# Patient Record
Sex: FEMALE | Race: WHITE | Employment: FULL TIME | URBAN - METROPOLITAN AREA
[De-identification: names, ages, dates, MRNs, and addresses within clinical notes are randomized per-mention and may not be internally consistent; named-entity substitution may affect disease eponyms.]

---

## 2017-05-12 PROCEDURE — 88175 CYTOPATH C/V AUTO FLUID REDO: CPT | Performed by: OBSTETRICS & GYNECOLOGY

## 2017-07-25 ENCOUNTER — OFFICE VISIT (OUTPATIENT)
Dept: INTERNAL MEDICINE CLINIC | Facility: CLINIC | Age: 57
End: 2017-07-25

## 2017-07-25 VITALS
BODY MASS INDEX: 21.07 KG/M2 | OXYGEN SATURATION: 97 % | SYSTOLIC BLOOD PRESSURE: 184 MMHG | TEMPERATURE: 98 F | DIASTOLIC BLOOD PRESSURE: 90 MMHG | HEART RATE: 97 BPM | WEIGHT: 128 LBS | HEIGHT: 65.5 IN

## 2017-07-25 DIAGNOSIS — Z00.00 ANNUAL PHYSICAL EXAM: ICD-10-CM

## 2017-07-25 DIAGNOSIS — E55.9 VITAMIN D DEFICIENCY: ICD-10-CM

## 2017-07-25 DIAGNOSIS — R94.31 ABNORMAL EKG: ICD-10-CM

## 2017-07-25 DIAGNOSIS — Z98.890 H/O RIGHT BREAST BIOPSY: ICD-10-CM

## 2017-07-25 DIAGNOSIS — Z76.89 ENCOUNTER TO ESTABLISH CARE: Primary | ICD-10-CM

## 2017-07-25 DIAGNOSIS — I10 HYPERTENSION, ESSENTIAL: ICD-10-CM

## 2017-07-25 DIAGNOSIS — I49.3 PVC'S (PREMATURE VENTRICULAR CONTRACTIONS): ICD-10-CM

## 2017-07-25 DIAGNOSIS — F17.200 SMOKER: ICD-10-CM

## 2017-07-25 PROCEDURE — 93000 ELECTROCARDIOGRAM COMPLETE: CPT | Performed by: INTERNAL MEDICINE

## 2017-07-25 PROCEDURE — 99386 PREV VISIT NEW AGE 40-64: CPT | Performed by: INTERNAL MEDICINE

## 2017-07-25 PROCEDURE — 90471 IMMUNIZATION ADMIN: CPT | Performed by: INTERNAL MEDICINE

## 2017-07-25 PROCEDURE — 93005 ELECTROCARDIOGRAM TRACING: CPT | Performed by: INTERNAL MEDICINE

## 2017-07-25 PROCEDURE — 90715 TDAP VACCINE 7 YRS/> IM: CPT | Performed by: INTERNAL MEDICINE

## 2017-07-25 RX ORDER — METOPROLOL SUCCINATE 50 MG/1
50 TABLET, EXTENDED RELEASE ORAL DAILY
Qty: 90 TABLET | Refills: 3 | Status: SHIPPED | OUTPATIENT
Start: 2017-07-25 | End: 2017-08-07

## 2017-07-25 RX ORDER — LORAZEPAM 0.5 MG/1
0.5 TABLET ORAL NIGHTLY PRN
Qty: 30 TABLET | Refills: 1 | Status: SHIPPED
Start: 2017-07-25 | End: 2018-04-13

## 2017-07-25 NOTE — PROGRESS NOTES
Angela Kothari is a 59year old female who presents for     Re-establish care. Last seen 12/18/12. Elevated blood pressure-  BP was elevated at Dr. Júnior Myers office last mo. BP was 160 to 170 range there. In past BP was on low side.  BP was 104/70 at l Packs/day: 0.75      Years: 0.00         Start date: 1/1/1978  Smokeless tobacco: Never Used                      Alcohol use:  Yes              Comment: occasional         Allergies:  No Known Allergies    Review of systems: her dtr's wedding in 5 wks. Discussed doing CT chest low dose screening. Order given. (30 pack years). Healthcare maintenance:  Vaccines- tdap today. Gyn Dr Gigi Ann. Pap normal 5/12/17.    Mammo-ordered by gyn, Last mammo at [facility] 10/12/16-

## 2017-08-04 ENCOUNTER — PRIOR ORIGINAL RECORDS (OUTPATIENT)
Dept: OTHER | Age: 57
End: 2017-08-04

## 2017-08-04 ENCOUNTER — HOSPITAL ENCOUNTER (OUTPATIENT)
Dept: CV DIAGNOSTICS | Facility: HOSPITAL | Age: 57
Discharge: HOME | End: 2017-08-04
Attending: INTERNAL MEDICINE
Payer: COMMERCIAL

## 2017-08-04 DIAGNOSIS — R94.31 ABNORMAL EKG: ICD-10-CM

## 2017-08-04 DIAGNOSIS — E55.9 VITAMIN D DEFICIENCY: ICD-10-CM

## 2017-08-04 DIAGNOSIS — Z76.89 ENCOUNTER TO ESTABLISH CARE: ICD-10-CM

## 2017-08-04 DIAGNOSIS — I10 HYPERTENSION, ESSENTIAL: ICD-10-CM

## 2017-08-04 DIAGNOSIS — I49.3 PVC'S (PREMATURE VENTRICULAR CONTRACTIONS): ICD-10-CM

## 2017-08-04 LAB
ALBUMIN SERPL BCP-MCNC: 3.8 G/DL (ref 3.5–4.8)
ALBUMIN/GLOB SERPL: 1.5 {RATIO} (ref 1–2)
ALP SERPL-CCNC: 37 U/L (ref 32–100)
ALT SERPL-CCNC: 16 U/L (ref 14–54)
ANION GAP SERPL CALC-SCNC: 6 MMOL/L (ref 0–18)
AST SERPL-CCNC: 16 U/L (ref 15–41)
BASOPHILS # BLD: 0.1 K/UL (ref 0–0.2)
BASOPHILS NFR BLD: 1 %
BILIRUB SERPL-MCNC: 0.7 MG/DL (ref 0.3–1.2)
BILIRUB UR QL: NEGATIVE
BUN SERPL-MCNC: 5 MG/DL (ref 8–20)
BUN/CREAT SERPL: 6.2 (ref 10–20)
CALCIUM SERPL-MCNC: 9.5 MG/DL (ref 8.5–10.5)
CHLORIDE SERPL-SCNC: 104 MMOL/L (ref 95–110)
CHOLEST SERPL-MCNC: 195 MG/DL (ref 110–200)
CLARITY UR: CLEAR
CO2 SERPL-SCNC: 27 MMOL/L (ref 22–32)
COLOR UR: YELLOW
CREAT SERPL-MCNC: 0.81 MG/DL (ref 0.5–1.5)
EOSINOPHIL # BLD: 0.1 K/UL (ref 0–0.7)
EOSINOPHIL NFR BLD: 1 %
ERYTHROCYTE [DISTWIDTH] IN BLOOD BY AUTOMATED COUNT: 13.5 % (ref 11–15)
GLOBULIN PLAS-MCNC: 2.5 G/DL (ref 2.5–3.7)
GLUCOSE SERPL-MCNC: 90 MG/DL (ref 70–99)
GLUCOSE UR-MCNC: NEGATIVE MG/DL
HCT VFR BLD AUTO: 40.5 % (ref 35–48)
HDLC SERPL-MCNC: 69 MG/DL
HGB BLD-MCNC: 14 G/DL (ref 12–16)
HGB UR QL STRIP.AUTO: NEGATIVE
KETONES UR-MCNC: NEGATIVE MG/DL
LDLC SERPL CALC-MCNC: 111 MG/DL (ref 0–99)
LYMPHOCYTES # BLD: 2.2 K/UL (ref 1–4)
LYMPHOCYTES NFR BLD: 23 %
MCH RBC QN AUTO: 31.4 PG (ref 27–32)
MCHC RBC AUTO-ENTMCNC: 34.5 G/DL (ref 32–37)
MCV RBC AUTO: 91 FL (ref 80–100)
MONOCYTES # BLD: 0.6 K/UL (ref 0–1)
MONOCYTES NFR BLD: 6 %
NEUTROPHILS # BLD AUTO: 6.8 K/UL (ref 1.8–7.7)
NEUTROPHILS NFR BLD: 70 %
NITRITE UR QL STRIP.AUTO: NEGATIVE
NONHDLC SERPL-MCNC: 126 MG/DL
OSMOLALITY UR CALC.SUM OF ELEC: 281 MOSM/KG (ref 275–295)
PH UR: 6 [PH] (ref 5–8)
PLATELET # BLD AUTO: 243 K/UL (ref 140–400)
PMV BLD AUTO: 7.3 FL (ref 7.4–10.3)
POTASSIUM SERPL-SCNC: 4 MMOL/L (ref 3.3–5.1)
PROT SERPL-MCNC: 6.3 G/DL (ref 5.9–8.4)
PROT UR-MCNC: NEGATIVE MG/DL
RBC # BLD AUTO: 4.45 M/UL (ref 3.7–5.4)
RBC #/AREA URNS AUTO: 1 /HPF
SODIUM SERPL-SCNC: 137 MMOL/L (ref 136–144)
SP GR UR STRIP: 1 (ref 1–1.03)
TRIGL SERPL-MCNC: 74 MG/DL (ref 1–149)
TSH SERPL-ACNC: 0.9 UIU/ML (ref 0.45–5.33)
UROBILINOGEN UR STRIP-ACNC: <2
VIT C UR-MCNC: NEGATIVE MG/DL
WBC # BLD AUTO: 9.8 K/UL (ref 4–11)
WBC #/AREA URNS AUTO: 0 /HPF

## 2017-08-04 PROCEDURE — 80053 COMPREHEN METABOLIC PANEL: CPT

## 2017-08-04 PROCEDURE — 93016 CV STRESS TEST SUPVJ ONLY: CPT | Performed by: INTERNAL MEDICINE

## 2017-08-04 PROCEDURE — 93017 CV STRESS TEST TRACING ONLY: CPT | Performed by: INTERNAL MEDICINE

## 2017-08-04 PROCEDURE — 84443 ASSAY THYROID STIM HORMONE: CPT

## 2017-08-04 PROCEDURE — 36415 COLL VENOUS BLD VENIPUNCTURE: CPT

## 2017-08-04 PROCEDURE — 81001 URINALYSIS AUTO W/SCOPE: CPT | Performed by: INTERNAL MEDICINE

## 2017-08-04 PROCEDURE — 82306 VITAMIN D 25 HYDROXY: CPT

## 2017-08-04 PROCEDURE — 93350 STRESS TTE ONLY: CPT | Performed by: INTERNAL MEDICINE

## 2017-08-04 PROCEDURE — 80061 LIPID PANEL: CPT

## 2017-08-04 PROCEDURE — 85025 COMPLETE CBC W/AUTO DIFF WBC: CPT

## 2017-08-04 PROCEDURE — 93018 CV STRESS TEST I&R ONLY: CPT | Performed by: INTERNAL MEDICINE

## 2017-08-07 ENCOUNTER — TELEPHONE (OUTPATIENT)
Dept: INTERNAL MEDICINE CLINIC | Facility: CLINIC | Age: 57
End: 2017-08-07

## 2017-08-07 DIAGNOSIS — I10 HYPERTENSION, ESSENTIAL: ICD-10-CM

## 2017-08-07 LAB — 25(OH)D3 SERPL-MCNC: 34.5 NG/ML

## 2017-08-07 RX ORDER — METOPROLOL SUCCINATE 50 MG/1
75 TABLET, EXTENDED RELEASE ORAL EVERY EVENING
Qty: 90 TABLET | Refills: 3 | COMMUNITY
Start: 2017-08-07 | End: 2017-12-28 | Stop reason: DRUGHIGH

## 2017-08-07 NOTE — TELEPHONE ENCOUNTER
I called pt:  Lab done 8/4/17-cbc cmp tsh lipid Vit D UA--results overall ok--. Discussed starting cholesterol lowering rx. Will work on BP med adjustment first. See below. Stress echo-8/4/17-abnormal stress echo.  LV didn't significantly improve

## 2017-08-08 LAB
ALBUMIN: 3.8 G/DL
ALKALINE PHOSPHATATE(ALK PHOS): 37 IU/L
ALT (SGPT): 16 U/L
AST (SGOT): 16 U/L
BILIRUBIN TOTAL: 0.7 MG/DL
BUN: 5 MG/DL
CALCIUM: 9.5 MG/DL
CHLORIDE: 104 MEQ/L
CHOLESTEROL, TOTAL: 195 MG/DL
CREATININE, SERUM: 0.81 MG/DL
GLOBULIN: 2.5 G/DL
GLUCOSE: 90 MG/DL
GLUCOSE: 90 MG/DL
HDL CHOLESTEROL: 69 MG/DL
HEMATOCRIT: 40.5 %
HEMOGLOBIN: 14 G/DL
LDL CHOLESTEROL: 111 MG/DL
NON-HDL CHOLESTEROL: 126 MG/DL
PLATELETS: 7.3 K/UL
POTASSIUM, SERUM: 4 MEQ/L
PROTEIN, TOTAL: 6.3 G/DL
RED BLOOD COUNT: 4.45 X 10-6/U
SGOT (AST): 16 IU/L
SGPT (ALT): 16 IU/L
SODIUM: 137 MEQ/L
THYROID STIMULATING HORMONE: 0.9 MLU/L
TRIGLYCERIDES: 74 MG/DL
WHITE BLOOD COUNT: 9.8 X 10-3/U

## 2017-08-09 ENCOUNTER — HOSPITAL ENCOUNTER (OUTPATIENT)
Dept: GENERAL RADIOLOGY | Facility: HOSPITAL | Age: 57
Discharge: HOME | End: 2017-08-09
Attending: INTERNAL MEDICINE
Payer: COMMERCIAL

## 2017-08-09 ENCOUNTER — PRIOR ORIGINAL RECORDS (OUTPATIENT)
Dept: OTHER | Age: 57
End: 2017-08-09

## 2017-08-09 DIAGNOSIS — Z01.810 PRE-OPERATIVE CARDIOVASCULAR EXAMINATION: ICD-10-CM

## 2017-08-09 DIAGNOSIS — I51.3 MURAL THROMBUS OF CARDIAC APEX: ICD-10-CM

## 2017-08-09 PROCEDURE — 71020 XR CHEST PA + LAT CHEST (CPT=71020): CPT | Performed by: INTERNAL MEDICINE

## 2017-08-09 RX ORDER — SODIUM CHLORIDE 9 MG/ML
INJECTION, SOLUTION INTRAVENOUS ONCE
Status: DISCONTINUED | OUTPATIENT
Start: 2017-08-10 | End: 2017-08-10

## 2017-08-10 ENCOUNTER — HOSPITAL ENCOUNTER (OUTPATIENT)
Dept: INTERVENTIONAL RADIOLOGY/VASCULAR | Facility: HOSPITAL | Age: 57
Discharge: HOME | End: 2017-08-10
Attending: INTERNAL MEDICINE | Admitting: INTERNAL MEDICINE
Payer: COMMERCIAL

## 2017-08-10 VITALS
DIASTOLIC BLOOD PRESSURE: 62 MMHG | SYSTOLIC BLOOD PRESSURE: 118 MMHG | HEART RATE: 77 BPM | WEIGHT: 123 LBS | BODY MASS INDEX: 20 KG/M2 | RESPIRATION RATE: 18 BRPM | OXYGEN SATURATION: 96 % | HEIGHT: 65.75 IN

## 2017-08-10 DIAGNOSIS — I10 HYPERTENSION: Primary | ICD-10-CM

## 2017-08-10 DIAGNOSIS — I49.3 PVC'S (PREMATURE VENTRICULAR CONTRACTIONS): ICD-10-CM

## 2017-08-10 DIAGNOSIS — R94.39 ABNORMAL STRESS TEST: ICD-10-CM

## 2017-08-10 PROCEDURE — 4A023N7 MEASUREMENT OF CARDIAC SAMPLING AND PRESSURE, LEFT HEART, PERCUTANEOUS APPROACH: ICD-10-PCS | Performed by: INTERNAL MEDICINE

## 2017-08-10 PROCEDURE — B2111ZZ FLUOROSCOPY OF MULTIPLE CORONARY ARTERIES USING LOW OSMOLAR CONTRAST: ICD-10-PCS | Performed by: INTERNAL MEDICINE

## 2017-08-10 PROCEDURE — 93458 L HRT ARTERY/VENTRICLE ANGIO: CPT

## 2017-08-10 PROCEDURE — 99152 MOD SED SAME PHYS/QHP 5/>YRS: CPT

## 2017-08-10 PROCEDURE — 99153 MOD SED SAME PHYS/QHP EA: CPT

## 2017-08-10 RX ORDER — SODIUM CHLORIDE 9 MG/ML
INJECTION, SOLUTION INTRAVENOUS CONTINUOUS
Status: ACTIVE | OUTPATIENT
Start: 2017-08-10 | End: 2017-08-10

## 2017-08-10 RX ORDER — VERAPAMIL HYDROCHLORIDE 2.5 MG/ML
INJECTION, SOLUTION INTRAVENOUS
Status: COMPLETED
Start: 2017-08-10 | End: 2017-08-10

## 2017-08-10 RX ORDER — MIDAZOLAM HYDROCHLORIDE 1 MG/ML
INJECTION INTRAMUSCULAR; INTRAVENOUS
Status: COMPLETED
Start: 2017-08-10 | End: 2017-08-10

## 2017-08-10 RX ORDER — SODIUM CHLORIDE 9 MG/ML
INJECTION, SOLUTION INTRAVENOUS
Status: DISCONTINUED
Start: 2017-08-10 | End: 2017-08-10

## 2017-08-10 RX ORDER — LISINOPRIL 5 MG/1
5 TABLET ORAL DAILY
Qty: 30 TABLET | Refills: 11 | Status: SHIPPED | OUTPATIENT
Start: 2017-08-11 | End: 2017-09-08

## 2017-08-10 RX ORDER — NITROGLYCERIN 20 MG/100ML
INJECTION INTRAVENOUS
Status: COMPLETED
Start: 2017-08-10 | End: 2017-08-10

## 2017-08-10 RX ORDER — HEPARIN SODIUM 1000 [USP'U]/ML
INJECTION, SOLUTION INTRAVENOUS; SUBCUTANEOUS
Status: COMPLETED
Start: 2017-08-10 | End: 2017-08-10

## 2017-08-10 RX ORDER — LIDOCAINE HYDROCHLORIDE 20 MG/ML
INJECTION, SOLUTION EPIDURAL; INFILTRATION; INTRACAUDAL; PERINEURAL
Status: COMPLETED
Start: 2017-08-10 | End: 2017-08-10

## 2017-08-10 RX ORDER — LISINOPRIL 10 MG/1
5 TABLET ORAL DAILY
Status: DISCONTINUED | OUTPATIENT
Start: 2017-08-11 | End: 2017-08-10

## 2017-08-10 NOTE — HISTORICAL OFFICE NOTE
Anitha Whitehead  : 1960  ACCOUNT:  [id number]  [phone]  PCP: Dr. Corry Riley     TODAY'S DATE: 2017  DICTATED BY:  [00]    CHIEF COMPLAINT: [Followup of Abnormal stress test.]    HPI:    [On 2017, Elias Veliz, a 59year old female, pr and thrills or rub. AUSC:  regular rhythm, normal S1, S2 without S3; no pathologic murmurs. CAROTIDS: without bruits. ABD AORTA: abdominal aorta not enlarged. FEM: femoral pulses intact. PEDAL: pedal pulses intact. EXT: no peripheral edema.      LABORATORY mild fluid removal and recently was placed on metoprolol succinate for blood pressure. SOCIAL HISTORY: She has smoked about a pack a day for 40 years. She drinks two alcoholic beverages per week and four to five cups of coffee a day.  She works in Education Everytime x-ray.    Collette Osmond, M.D.  Molly - DD: 08/09/2017 - DT: 08/09/2017 - Job ID: [id number] - c: Dr. Brooke Torre

## 2017-08-10 NOTE — PRE-SEDATION ASSESSMENT
Pre-Procedure Sedation Assessment    Chief Complaint/Indication for procedure: Abnormal echo  History of snoring or sleep or apnea?    No    History of previous problems with anesthesia or sedation  No    Physical Findings:  Neck: nl ROM  CV: Normal. Stephanie Vilchis

## 2017-08-10 NOTE — PROCEDURES
Pre-op: Abnormal echo  Post-op: Same  Procedure: Left heart cath, LV and coronary angiogram.  Right radial approach. Findings: Coronary arteries normal except for mild tapering of proximal RCA which may be catheter induced. LVEDP equals 14.   Mild global

## 2017-08-11 NOTE — DISCHARGE SUMMARY
[facility]    PATIENT'S NAME: Lizbeth Gil   ATTENDING PHYSICIAN: Jose Guadalupe Farrar MD   PATIENT ACCOUNT#:   [id number]    LOCATION:  [place] [address]  MEDICAL RECORD #:   [id number]       YOB: 1960  ADMISSION DATE:

## 2017-08-14 ENCOUNTER — PRIOR ORIGINAL RECORDS (OUTPATIENT)
Dept: OTHER | Age: 57
End: 2017-08-14

## 2017-08-14 ENCOUNTER — HOSPITAL ENCOUNTER (INPATIENT)
Facility: HOSPITAL | Age: 57
LOS: 1 days | Discharge: HOME | DRG: 300 | End: 2017-08-15
Attending: EMERGENCY MEDICINE | Admitting: HOSPITALIST
Payer: COMMERCIAL

## 2017-08-14 ENCOUNTER — HOSPITAL ENCOUNTER (OUTPATIENT)
Dept: ULTRASOUND IMAGING | Facility: HOSPITAL | Age: 57
Discharge: HOME | End: 2017-08-14
Attending: INTERNAL MEDICINE
Payer: COMMERCIAL

## 2017-08-14 DIAGNOSIS — R52 PAIN: ICD-10-CM

## 2017-08-14 DIAGNOSIS — I74.2 RADIAL ARTERY THROMBOSIS, RIGHT (HCC): Primary | ICD-10-CM

## 2017-08-14 LAB
ANION GAP SERPL CALC-SCNC: 8 MMOL/L (ref 0–18)
APTT PPP: 29 SECONDS (ref 23.2–35.3)
BASOPHILS # BLD: 0.1 K/UL (ref 0–0.2)
BASOPHILS NFR BLD: 1 %
BUN SERPL-MCNC: 13 MG/DL (ref 8–20)
BUN/CREAT SERPL: 16.9 (ref 10–20)
CALCIUM SERPL-MCNC: 8.9 MG/DL (ref 8.5–10.5)
CHLORIDE SERPL-SCNC: 106 MMOL/L (ref 95–110)
CO2 SERPL-SCNC: 23 MMOL/L (ref 22–32)
CREAT SERPL-MCNC: 0.77 MG/DL (ref 0.5–1.5)
EOSINOPHIL # BLD: 0.1 K/UL (ref 0–0.7)
EOSINOPHIL NFR BLD: 1 %
ERYTHROCYTE [DISTWIDTH] IN BLOOD BY AUTOMATED COUNT: 13.9 % (ref 11–15)
ERYTHROCYTE [DISTWIDTH] IN BLOOD BY AUTOMATED COUNT: 13.9 % (ref 11–15)
GLUCOSE SERPL-MCNC: 94 MG/DL (ref 70–99)
HCT VFR BLD AUTO: 37 % (ref 35–48)
HCT VFR BLD AUTO: 37 % (ref 35–48)
HGB BLD-MCNC: 13 G/DL (ref 12–16)
HGB BLD-MCNC: 13 G/DL (ref 12–16)
INR BLD: 1 (ref 0.9–1.2)
LYMPHOCYTES # BLD: 2.3 K/UL (ref 1–4)
LYMPHOCYTES NFR BLD: 22 %
MCH RBC QN AUTO: 31.4 PG (ref 27–32)
MCH RBC QN AUTO: 31.4 PG (ref 27–32)
MCHC RBC AUTO-ENTMCNC: 35 G/DL (ref 32–37)
MCHC RBC AUTO-ENTMCNC: 35 G/DL (ref 32–37)
MCV RBC AUTO: 89.6 FL (ref 80–100)
MCV RBC AUTO: 89.6 FL (ref 80–100)
MONOCYTES # BLD: 0.7 K/UL (ref 0–1)
MONOCYTES NFR BLD: 7 %
NEUTROPHILS # BLD AUTO: 6.9 K/UL (ref 1.8–7.7)
NEUTROPHILS NFR BLD: 68 %
OSMOLALITY UR CALC.SUM OF ELEC: 284 MOSM/KG (ref 275–295)
PLATELET # BLD AUTO: 222 K/UL (ref 140–400)
PLATELET # BLD AUTO: 222 K/UL (ref 140–400)
PMV BLD AUTO: 6.8 FL (ref 7.4–10.3)
PMV BLD AUTO: 6.8 FL (ref 7.4–10.3)
POTASSIUM SERPL-SCNC: 3.5 MMOL/L (ref 3.3–5.1)
PROTHROMBIN TIME: 12.5 SECONDS (ref 11.8–14.5)
RBC # BLD AUTO: 4.13 M/UL (ref 3.7–5.4)
RBC # BLD AUTO: 4.13 M/UL (ref 3.7–5.4)
SODIUM SERPL-SCNC: 137 MMOL/L (ref 136–144)
WBC # BLD AUTO: 10.1 K/UL (ref 4–11)
WBC # BLD AUTO: 10.1 K/UL (ref 4–11)

## 2017-08-14 PROCEDURE — 93926 LOWER EXTREMITY STUDY: CPT | Performed by: INTERNAL MEDICINE

## 2017-08-14 PROCEDURE — 99222 1ST HOSP IP/OBS MODERATE 55: CPT | Performed by: HOSPITALIST

## 2017-08-14 RX ORDER — HYDROMORPHONE HYDROCHLORIDE 1 MG/ML
0.5 INJECTION, SOLUTION INTRAMUSCULAR; INTRAVENOUS; SUBCUTANEOUS EVERY 30 MIN PRN
Status: ACTIVE | OUTPATIENT
Start: 2017-08-14 | End: 2017-08-15

## 2017-08-14 RX ORDER — SODIUM CHLORIDE 9 MG/ML
INJECTION, SOLUTION INTRAVENOUS CONTINUOUS
Status: DISPENSED | OUTPATIENT
Start: 2017-08-14 | End: 2017-08-15

## 2017-08-14 RX ORDER — HEPARIN SODIUM 1000 [USP'U]/ML
60 INJECTION, SOLUTION INTRAVENOUS; SUBCUTANEOUS ONCE
Status: COMPLETED | OUTPATIENT
Start: 2017-08-14 | End: 2017-08-14

## 2017-08-14 RX ORDER — LISINOPRIL 5 MG/1
5 TABLET ORAL 2 TIMES DAILY
Status: DISCONTINUED | OUTPATIENT
Start: 2017-08-15 | End: 2017-08-15

## 2017-08-14 RX ORDER — HEPARIN SODIUM AND DEXTROSE 10000; 5 [USP'U]/100ML; G/100ML
12 INJECTION INTRAVENOUS ONCE
Status: COMPLETED | OUTPATIENT
Start: 2017-08-14 | End: 2017-08-14

## 2017-08-14 RX ORDER — HEPARIN SODIUM AND DEXTROSE 10000; 5 [USP'U]/100ML; G/100ML
INJECTION INTRAVENOUS CONTINUOUS
Status: DISCONTINUED | OUTPATIENT
Start: 2017-08-15 | End: 2017-08-15

## 2017-08-14 RX ORDER — MORPHINE SULFATE 4 MG/ML
4 INJECTION, SOLUTION INTRAMUSCULAR; INTRAVENOUS ONCE
Status: COMPLETED | OUTPATIENT
Start: 2017-08-14 | End: 2017-08-14

## 2017-08-14 RX ORDER — ONDANSETRON 2 MG/ML
4 INJECTION INTRAMUSCULAR; INTRAVENOUS EVERY 4 HOURS PRN
Status: DISCONTINUED | OUTPATIENT
Start: 2017-08-14 | End: 2017-08-15

## 2017-08-14 RX ORDER — SODIUM CHLORIDE 0.9 % (FLUSH) 0.9 %
3 SYRINGE (ML) INJECTION AS NEEDED
Status: DISCONTINUED | OUTPATIENT
Start: 2017-08-14 | End: 2017-08-15

## 2017-08-14 RX ORDER — ONDANSETRON 2 MG/ML
4 INJECTION INTRAMUSCULAR; INTRAVENOUS EVERY 6 HOURS PRN
Status: DISCONTINUED | OUTPATIENT
Start: 2017-08-14 | End: 2017-08-15

## 2017-08-14 RX ORDER — NITROGLYCERIN 0.4 MG/1
0.4 TABLET SUBLINGUAL EVERY 5 MIN PRN
Status: DISCONTINUED | OUTPATIENT
Start: 2017-08-14 | End: 2017-08-15

## 2017-08-14 RX ORDER — ACETAMINOPHEN 325 MG/1
650 TABLET ORAL EVERY 6 HOURS PRN
Status: DISCONTINUED | OUTPATIENT
Start: 2017-08-14 | End: 2017-08-15

## 2017-08-14 RX ORDER — HYDROCODONE BITARTRATE AND ACETAMINOPHEN 5; 325 MG/1; MG/1
1 TABLET ORAL EVERY 6 HOURS PRN
Status: DISCONTINUED | OUTPATIENT
Start: 2017-08-14 | End: 2017-08-15

## 2017-08-14 RX ORDER — TRIAMTERENE AND HYDROCHLOROTHIAZIDE 37.5; 25 MG/1; MG/1
1 CAPSULE ORAL DAILY
Status: DISCONTINUED | OUTPATIENT
Start: 2017-08-15 | End: 2017-08-15

## 2017-08-14 RX ORDER — RALOXIFENE HYDROCHLORIDE 60 MG/1
60 TABLET, FILM COATED ORAL DAILY
Status: DISCONTINUED | OUTPATIENT
Start: 2017-08-15 | End: 2017-08-15

## 2017-08-15 ENCOUNTER — TELEPHONE (OUTPATIENT)
Dept: INTERNAL MEDICINE CLINIC | Facility: CLINIC | Age: 57
End: 2017-08-15

## 2017-08-15 VITALS
SYSTOLIC BLOOD PRESSURE: 106 MMHG | BODY MASS INDEX: 21.08 KG/M2 | DIASTOLIC BLOOD PRESSURE: 51 MMHG | OXYGEN SATURATION: 96 % | HEIGHT: 65 IN | HEART RATE: 58 BPM | TEMPERATURE: 98 F | WEIGHT: 126.5 LBS | RESPIRATION RATE: 16 BRPM

## 2017-08-15 LAB
APTT PPP: 31.9 SECONDS (ref 23.2–35.3)
APTT PPP: 40.6 SECONDS (ref 23.2–35.3)
BASOPHILS # BLD: 0 K/UL (ref 0–0.2)
BASOPHILS NFR BLD: 1 %
EOSINOPHIL # BLD: 0.2 K/UL (ref 0–0.7)
EOSINOPHIL NFR BLD: 3 %
ERYTHROCYTE [DISTWIDTH] IN BLOOD BY AUTOMATED COUNT: 13.9 % (ref 11–15)
HCT VFR BLD AUTO: 34 % (ref 35–48)
HGB BLD-MCNC: 11.9 G/DL (ref 12–16)
LYMPHOCYTES # BLD: 2.7 K/UL (ref 1–4)
LYMPHOCYTES NFR BLD: 32 %
MCH RBC QN AUTO: 31.6 PG (ref 27–32)
MCHC RBC AUTO-ENTMCNC: 34.9 G/DL (ref 32–37)
MCV RBC AUTO: 90.5 FL (ref 80–100)
MONOCYTES # BLD: 0.6 K/UL (ref 0–1)
MONOCYTES NFR BLD: 8 %
NEUTROPHILS # BLD AUTO: 4.8 K/UL (ref 1.8–7.7)
NEUTROPHILS NFR BLD: 57 %
PLATELET # BLD AUTO: 199 K/UL (ref 140–400)
PMV BLD AUTO: 7.6 FL (ref 7.4–10.3)
RBC # BLD AUTO: 3.76 M/UL (ref 3.7–5.4)
WBC # BLD AUTO: 8.4 K/UL (ref 4–11)

## 2017-08-15 PROCEDURE — 99239 HOSP IP/OBS DSCHRG MGMT >30: CPT | Performed by: HOSPITALIST

## 2017-08-15 RX ORDER — HYDROCODONE BITARTRATE AND ACETAMINOPHEN 5; 325 MG/1; MG/1
1 TABLET ORAL EVERY 6 HOURS PRN
Qty: 20 TABLET | Refills: 0 | Status: SHIPPED | OUTPATIENT
Start: 2017-08-15 | End: 2017-12-28

## 2017-08-15 RX ORDER — HEPARIN SODIUM 1000 [USP'U]/ML
30 INJECTION, SOLUTION INTRAVENOUS; SUBCUTANEOUS ONCE
Status: COMPLETED | OUTPATIENT
Start: 2017-08-15 | End: 2017-08-15

## 2017-08-15 NOTE — ED NOTES
Pt had angiogram Thursday, showed no blockages but \"weakness\" in vessels. Had stress test done same day. US earlier today showed radial R DVT. Pain noted to wrist and R arm, extends from wrist to shoulder. Pain rated \"7/10\".  Pt is not on anticoag thera

## 2017-08-15 NOTE — CONSULTS
Cardiology (Consult dictated)    Assessment:  1. Right radial artery thrombosis. Cardiac cath from radial artery 8/10/17. 2. Mild dilated cardiomyopathy    3.  HTN    Plan:  Admit for IV heparin  If still symptomatic in AM, consider lytics; if not, home

## 2017-08-15 NOTE — ED PROVIDER NOTES
Patient Seen in: [facility] AND [facility] Emergency Department    History   Patient presents with:  Arm Pain      HPI    Patient presents complaining of severe pain in her right arm in her recent catheterization site of her radial artery.   States she had a card Tablet 24 Hr Take 75 mg by mouth every evening. Disp: 90 tablet Rfl: 3 8/14/2017 at Unknown time   LORazepam 0.5 MG Oral Tab Take 1 tablet (0.5 mg total) by mouth nightly as needed for Anxiety (or sleep).  Disp: 30 tablet Rfl: 1 Taking   Triamterene-HCTZ Constitutional: Negative for chills and fever. HENT: Negative for congestion and sore throat. Eyes: Negative for pain and visual disturbance. Respiratory: Negative for cough and shortness of breath.     Cardiovascular: Negative for chest pain and p NO DIFFERENTIAL - Abnormal; Notable for the following:        Result Value    MPV 6.8 (*)     All other components within normal limits   CBC W/ DIFFERENTIAL - Abnormal; Notable for the following:     MPV 6.8 (*)     All other components within normal limi DOSE (12 Units/kg/hr × 57.7 kg Intravenous Handoff 8/14/17 2208)   morphINE sulfate (PF) 4 MG/ML injection 4 mg (4 mg Intravenous Given 8/14/17 2122)         [facility]      08/14/17 2013 08/14/17 2149 08/14/17 2244   BP: 150/77 131/58 128/74   BP Location:

## 2017-08-15 NOTE — CONSULTS
[facility]    PATIENT'S NAME: Ella Cristina   ATTENDING PHYSICIAN: Melissa Brown. Kimberly Toussaint, [address]: Laura Galvan.  Ilda Dunbar MD   PATIENT ACCOUNT#:   [id number]    LOCATION:  [facility] 31 [address] [facility] 1  MEDICAL RECORD #:   [id number]       DATE OF BIRTH q.a.m., Dyazide 1 tablet daily, vitamin D, lorazepam as needed, Evista 60 mg daily, recently started on lisinopril 5 q.a.m. ALLERGIES:  None known.     SOCIAL HISTORY:  Forty pack-year smoker still smoking, 2 alcoholic beverages per week, 4 to 5 cups of MD

## 2017-08-15 NOTE — ED INITIAL ASSESSMENT (HPI)
States she had a US today and was told top come top  The ED for a \" blood clot in the Rt wrist\". C/O Rt arm pain . No fever.

## 2017-08-15 NOTE — TELEPHONE ENCOUNTER
I spoke to Dr Ezra Galvez on Friday 8/11/17. He saw pt 8/8/17 and reviewed pt's stress echo. He noted the whole ventricle looked hypokinetic. He then did cardiac cath on 8/10/17. Coronary arteries ok. LV dysfunction globally.  Relatively mild and hopefully w

## 2017-08-15 NOTE — PROGRESS NOTES
[facility] AND CLINICS  Progress Note    Marcos Dewey Patient Status:  Inpatient    1960 MRN [id number]   Location [facility] 3W/SW Attending Derek Redman MD   Hosp Day # 1 PCP Navi Spencer MD     Assessment:    1.   Acute right radia Extremities: Right hand warm, pink, normal. R radial pulse 1-2+  Skin: Warm and dry.      Labs:    Lab Results  Component Value Date   WBC 8.4 08/15/2017   HGB 11.9 08/15/2017   HCT 34.0 08/15/2017    08/15/2017       Lab Results  Component Value D

## 2017-08-15 NOTE — BH PROGRESS NOTE
ADMISSION NOTE    59year old female s/p R radial coronary angiogram 8/10 presents with pain in R arm found to have Radial to brachial thrombus . Available medical records partially reviewed. Dictation to follow.     Carla Evans M.D

## 2017-08-15 NOTE — HISTORICAL OFFICE NOTE
Jasbir Woods  : 1960  ACCOUNT:  [id number]  [phone]  PCP: Dr. Braxton Vallejo     TODAY'S DATE: 2017  DICTATED BY:  FELIPE Jason]    CHIEF COMPLAINT: [Followup of Abnormal stress test, Followup of Cardiomyopathy, Dilated and Followup melena, hematochezia. : no hematuria. INTEG: no new rashes, lesions. MS: no limiting arthritis. NEURO: no localized deficits. HEM/LYMPH: denies easy bruising. ALL: no new food or enviornmental allergies.       PAST HISTORY: atypical ductal hyperplasia of metoprolol dose and return to see the nurse practitioner in 2 weeks, at which time, hopefully, spironolactone can be added. The patient has an order to get a basic metabolic panel later this week. 3. Hypertension.   The patient's blood pressure is on th well.  She was brought to [facility] for elective cardiac catheterization on 8/10/2017 done from the right radial approach without complication. LVEDP was 14.   Coronaries were normal except for minimal disease of the proximal right which m

## 2017-08-15 NOTE — DISCHARGE SUMMARY
More than 30 min spent on dc  Dc summary #   Hospital Discharge Diagnoses: radial artery thrombosis    TCM Diagnosis at discharge from Hospital: see above    Please note that only patients enrolled in the Medicare ACO, [facility] ACO and [address] [place] [address] will be handle

## 2017-08-15 NOTE — H&P
[facility]    PATIENT'S NAME: Ana Brody   ATTENDING PHYSICIAN: Yogi Patel MD   PATIENT ACCOUNT#:   [id number]    LOCATION:  [address] [place] [address] Road #:   [id number]       YOB: 1960  ADMISSION DATE:       08/1 The patient reports her mother is 80. She has diabetes, dementia, and hypertension. Her father is 80, has COPD and dementia. SOCIAL HISTORY:  The patient has a 40-pack-year tobacco history and continues to smoke.   She says that she has decreased and space.  There is no redness or warmth. There is actually no swelling of the upper extremity. Radial pulse is easily palpated. The right hand is warm with good capillary refill.   There is no limitation of movement of the fingers and no discomfort with th MD  d: 08/15/2017 03:34:14  t: 08/15/2017 04:59:22  [facility] [id number]  GALLO/

## 2017-08-16 ENCOUNTER — PRIOR ORIGINAL RECORDS (OUTPATIENT)
Dept: OTHER | Age: 57
End: 2017-08-16

## 2017-08-16 ENCOUNTER — PATIENT OUTREACH (OUTPATIENT)
Dept: INTERNAL MEDICINE CLINIC | Facility: CLINIC | Age: 57
End: 2017-08-16

## 2017-08-16 NOTE — DISCHARGE SUMMARY
[facility]    PATIENT'S NAME: Ariella Handler   ATTENDING PHYSICIAN: Kb Her MD   PATIENT ACCOUNT#:   [id number]    LOCATION:  [address] [place] [address] RECORD #:   [id number]       YOB: 1960  ADMISSION DATE:       08/14/20 325 mg daily. 4.   Eliquis 5 mg twice a day. 5.   Lisinopril 5 mg twice a day. 6.   Metoprolol ER 75 mg daily. 7.   Ultravate external lotion 0.05% topical daily. 8.   Dyazide 1 capsule daily. 9.   Raloxifene 60 mg daily.   10.   Vitamin D 2000 units

## 2017-08-16 NOTE — TELEPHONE ENCOUNTER
I called pt. She saw Dr. Lauren Pandya again today b/c her hand hurt again and R radial artery is doing ok. He said to continue taking eliquis. BP is doing good--126/80 today. Dr Lauren Pandya is having her do blood test tomorrow.

## 2017-08-17 NOTE — PROGRESS NOTES
Initial Post Discharge Follow Up   Discharge Date: 8/15/17  Contact Date: 8/16/2017    Consent Verification:  Assessment Completed With: Patient  HIPAA Verified? Yes    1. Tell me why you were in the hospital?  Blood clot to right arm per patient.     2. W medicine? Never     6. Do you stop taking your medicine when you feel better? Don't know/Not sure    7. Do you ever stop taking your medicine because you feel worse after taking it? No    8. Which issues keep you from taking your medicine as prescribed?

## 2017-08-22 ENCOUNTER — TELEPHONE (OUTPATIENT)
Dept: INTERNAL MEDICINE CLINIC | Facility: CLINIC | Age: 57
End: 2017-08-22

## 2017-08-22 RX ORDER — AZITHROMYCIN 250 MG/1
TABLET, FILM COATED ORAL
Qty: 1 PACKAGE | Refills: 0 | Status: SHIPPED | OUTPATIENT
Start: 2017-08-22 | End: 2017-09-08

## 2017-08-22 NOTE — TELEPHONE ENCOUNTER
Called patient and relayed Dr. Gian Nogueira message. Informed her that her Z-pack Rx has been sent to [address] [place] [address] in [place] and to call the office is she still isn't feeling better. Patient verbalized understanding.

## 2017-08-22 NOTE — TELEPHONE ENCOUNTER
Pt was in the hospital last week for a blood clot, now pt has a terrible cold, body aches, sore throat & stuffed nose this started on Sunday.     Pt would like something called into [facility] or should she be seen  Pt's daughter is

## 2017-08-22 NOTE — TELEPHONE ENCOUNTER
To nursing, please tell patient I sent to her [address] in [place] her prescription for Zithromax Z-JOSIE. If not better let us know. Thanks.

## 2017-08-24 ENCOUNTER — PRIOR ORIGINAL RECORDS (OUTPATIENT)
Dept: OTHER | Age: 57
End: 2017-08-24

## 2017-08-24 ENCOUNTER — APPOINTMENT (OUTPATIENT)
Dept: LAB | Facility: HOSPITAL | Age: 57
End: 2017-08-24
Attending: INTERNAL MEDICINE
Payer: COMMERCIAL

## 2017-08-24 DIAGNOSIS — I10 HYPERTENSION: ICD-10-CM

## 2017-08-24 LAB
ANION GAP SERPL CALC-SCNC: 11 MMOL/L (ref 0–18)
BUN SERPL-MCNC: 53 MG/DL (ref 8–20)
BUN/CREAT SERPL: 22.2 (ref 10–20)
CALCIUM SERPL-MCNC: 8.8 MG/DL (ref 8.5–10.5)
CHLORIDE SERPL-SCNC: 102 MMOL/L (ref 95–110)
CO2 SERPL-SCNC: 19 MMOL/L (ref 22–32)
CREAT SERPL-MCNC: 2.39 MG/DL (ref 0.5–1.5)
GLUCOSE SERPL-MCNC: 98 MG/DL (ref 70–99)
OSMOLALITY UR CALC.SUM OF ELEC: 288 MOSM/KG (ref 275–295)
POTASSIUM SERPL-SCNC: 3.4 MMOL/L (ref 3.3–5.1)
SODIUM SERPL-SCNC: 132 MMOL/L (ref 136–144)

## 2017-08-24 PROCEDURE — 36415 COLL VENOUS BLD VENIPUNCTURE: CPT

## 2017-08-24 PROCEDURE — 80048 BASIC METABOLIC PNL TOTAL CA: CPT

## 2017-08-25 ENCOUNTER — PRIOR ORIGINAL RECORDS (OUTPATIENT)
Dept: OTHER | Age: 57
End: 2017-08-25

## 2017-08-25 LAB
BUN: 53 MG/DL
CALCIUM: 8.8 MG/DL
CHLORIDE: 102 MEQ/L
CREATININE, SERUM: 2.39 MG/DL
GLUCOSE: 98 MG/DL
POTASSIUM, SERUM: 3.4 MEQ/L
SODIUM: 132 MEQ/L

## 2017-08-28 ENCOUNTER — TELEPHONE (OUTPATIENT)
Dept: INTERNAL MEDICINE CLINIC | Facility: CLINIC | Age: 57
End: 2017-08-28

## 2017-08-28 ENCOUNTER — APPOINTMENT (OUTPATIENT)
Dept: LAB | Facility: HOSPITAL | Age: 57
End: 2017-08-28
Attending: INTERNAL MEDICINE
Payer: COMMERCIAL

## 2017-08-28 ENCOUNTER — PRIOR ORIGINAL RECORDS (OUTPATIENT)
Dept: OTHER | Age: 57
End: 2017-08-28

## 2017-08-28 DIAGNOSIS — I10 HYPERTENSION, ESSENTIAL: ICD-10-CM

## 2017-08-28 LAB
ANION GAP SERPL CALC-SCNC: 7 MMOL/L (ref 0–18)
BUN SERPL-MCNC: 18 MG/DL (ref 8–20)
BUN/CREAT SERPL: 20.7 (ref 10–20)
BUN: 18 MG/DL
CALCIUM SERPL-MCNC: 8.4 MG/DL (ref 8.5–10.5)
CALCIUM: 8.4 MG/DL
CHLORIDE SERPL-SCNC: 100 MMOL/L (ref 95–110)
CHLORIDE: 100 MEQ/L
CO2 SERPL-SCNC: 22 MMOL/L (ref 22–32)
CREAT SERPL-MCNC: 0.87 MG/DL (ref 0.5–1.5)
CREATININE, SERUM: 0.87 MG/DL
GLUCOSE SERPL-MCNC: 111 MG/DL (ref 70–99)
GLUCOSE: 111 MG/DL
OSMOLALITY UR CALC.SUM OF ELEC: 271 MOSM/KG (ref 275–295)
POTASSIUM SERPL-SCNC: 3.5 MMOL/L (ref 3.3–5.1)
POTASSIUM, SERUM: 3.5 MEQ/L
SODIUM SERPL-SCNC: 129 MMOL/L (ref 136–144)
SODIUM: 129 MEQ/L

## 2017-08-28 PROCEDURE — 80048 BASIC METABOLIC PNL TOTAL CA: CPT

## 2017-08-28 PROCEDURE — 36415 COLL VENOUS BLD VENIPUNCTURE: CPT

## 2017-08-28 NOTE — TELEPHONE ENCOUNTER
I left message on patient's voicemail–BMP results of today 8/28/17 were sent to me (Dr. Hernandez Buggy order)–  sodium 129 potassium 3.5 creatinine 0.87 BUN 18 GFR > 60. In view of low sodium, I recommend stop Dyazide. Continue lisinopril 5 mg daily.   Call t

## 2017-08-31 ENCOUNTER — PRIOR ORIGINAL RECORDS (OUTPATIENT)
Dept: OTHER | Age: 57
End: 2017-08-31

## 2017-09-05 ENCOUNTER — HOSPITAL ENCOUNTER (OUTPATIENT)
Dept: ULTRASOUND IMAGING | Facility: HOSPITAL | Age: 57
End: 2017-09-05
Attending: INTERNAL MEDICINE
Payer: COMMERCIAL

## 2017-09-05 ENCOUNTER — HOSPITAL ENCOUNTER (OUTPATIENT)
Dept: ULTRASOUND IMAGING | Facility: HOSPITAL | Age: 57
Discharge: HOME | End: 2017-09-05
Attending: INTERNAL MEDICINE
Payer: COMMERCIAL

## 2017-09-05 DIAGNOSIS — I74.2 RADIAL ARTERY THROMBOSIS, RIGHT (HCC): ICD-10-CM

## 2017-09-05 PROCEDURE — 93931 UPPER EXTREMITY STUDY: CPT | Performed by: INTERNAL MEDICINE

## 2017-09-07 ENCOUNTER — PRIOR ORIGINAL RECORDS (OUTPATIENT)
Dept: OTHER | Age: 57
End: 2017-09-07

## 2017-09-08 ENCOUNTER — OFFICE VISIT (OUTPATIENT)
Dept: INTERNAL MEDICINE CLINIC | Facility: CLINIC | Age: 57
End: 2017-09-08

## 2017-09-08 ENCOUNTER — PRIOR ORIGINAL RECORDS (OUTPATIENT)
Dept: OTHER | Age: 57
End: 2017-09-08

## 2017-09-08 VITALS
SYSTOLIC BLOOD PRESSURE: 102 MMHG | OXYGEN SATURATION: 98 % | TEMPERATURE: 99 F | HEIGHT: 65.5 IN | DIASTOLIC BLOOD PRESSURE: 64 MMHG | HEART RATE: 94 BPM | RESPIRATION RATE: 18 BRPM | BODY MASS INDEX: 20.74 KG/M2 | WEIGHT: 126 LBS

## 2017-09-08 DIAGNOSIS — I42.9 CARDIOMYOPATHY, UNSPECIFIED TYPE (HCC): ICD-10-CM

## 2017-09-08 DIAGNOSIS — I74.2 RADIAL ARTERY THROMBOSIS, RIGHT (HCC): ICD-10-CM

## 2017-09-08 DIAGNOSIS — F17.200 SMOKER: ICD-10-CM

## 2017-09-08 DIAGNOSIS — E87.1 HYPONATREMIA: ICD-10-CM

## 2017-09-08 DIAGNOSIS — I10 HYPERTENSION, ESSENTIAL: Primary | ICD-10-CM

## 2017-09-08 DIAGNOSIS — R39.9 UTI SYMPTOMS: ICD-10-CM

## 2017-09-08 DIAGNOSIS — K58.9 IRRITABLE BOWEL SYNDROME, UNSPECIFIED TYPE: ICD-10-CM

## 2017-09-08 LAB
BILIRUB UR QL: NEGATIVE
CLARITY UR: CLEAR
COLOR UR: YELLOW
GLUCOSE UR-MCNC: NEGATIVE MG/DL
HGB UR QL STRIP.AUTO: NEGATIVE
HYALINE CASTS #/AREA URNS AUTO: 1 /LPF
KETONES UR-MCNC: NEGATIVE MG/DL
NITRITE UR QL STRIP.AUTO: NEGATIVE
PH UR: 6 [PH] (ref 5–8)
PROT UR-MCNC: NEGATIVE MG/DL
RBC #/AREA URNS AUTO: <1 /HPF
SP GR UR STRIP: 1.01 (ref 1–1.03)
UROBILINOGEN UR STRIP-ACNC: <2
VIT C UR-MCNC: NEGATIVE MG/DL
WBC #/AREA URNS AUTO: 8 /HPF

## 2017-09-08 PROCEDURE — 99212 OFFICE O/P EST SF 10 MIN: CPT | Performed by: INTERNAL MEDICINE

## 2017-09-08 PROCEDURE — 99214 OFFICE O/P EST MOD 30 MIN: CPT | Performed by: INTERNAL MEDICINE

## 2017-09-08 RX ORDER — DICYCLOMINE HYDROCHLORIDE 10 MG/1
10 CAPSULE ORAL 3 TIMES DAILY PRN
Qty: 30 CAPSULE | Refills: 1 | Status: SHIPPED | OUTPATIENT
Start: 2017-09-08 | End: 2017-10-28

## 2017-09-08 RX ORDER — LISINOPRIL 5 MG/1
7.5 TABLET ORAL DAILY
COMMUNITY
End: 2017-09-21

## 2017-09-08 RX ORDER — LISINOPRIL 10 MG/1
TABLET ORAL
Refills: 4 | COMMUNITY
Start: 2017-08-14 | End: 2017-09-08

## 2017-09-08 RX ORDER — NITROFURANTOIN 25; 75 MG/1; MG/1
100 CAPSULE ORAL 2 TIMES DAILY
Qty: 10 CAPSULE | Refills: 0 | Status: SHIPPED | OUTPATIENT
Start: 2017-09-08 | End: 2017-11-10

## 2017-09-08 RX ORDER — BETAMETHASONE DIPROPIONATE 0.5 MG/G
SPRAY TOPICAL
Refills: 0 | COMMUNITY
Start: 2017-06-23 | End: 2017-12-28

## 2017-09-08 RX ORDER — BUPROPION HYDROCHLORIDE 150 MG/1
150 TABLET ORAL DAILY
Qty: 30 TABLET | Refills: 11 | Status: SHIPPED | OUTPATIENT
Start: 2017-09-08 | End: 2018-09-08

## 2017-09-08 RX ORDER — ASPIRIN 325 MG
TABLET, DELAYED RELEASE (ENTERIC COATED) ORAL
Refills: 1 | COMMUNITY
Start: 2017-08-16 | End: 2017-12-28 | Stop reason: CLARIF

## 2017-09-08 NOTE — PROGRESS NOTES
Candance Hymen is a 59year old female who presents for     Check at 6 weeks    Hypertension-  Tolerating metoprolol ER 75 mg daily and lisinopril–was taking 5 mg daily and dose was increased today by the APN of Dr. Rom Canales to lisinopril 7.5 mg daily.   [facility] cardiac cath Dr Josefina Be 8/10/17 -Coronary arteries normal. Mild global LV hypokinesis. Estimated EF 40–45%   • Essential hypertension    • H/O colonoscopy 06/26/2008    Colonoscopy Dr. Van Zamorano 6/26/08–hyperplastic polyp and internal hemorrhoids.    • IBS caridad dysuria.   No blood in the urine        EXAM:   /64 (BP Location: Left arm, Patient Position: Sitting, Cuff Size: adult)   Pulse 94   Temp 98.6 °F (37 °C) (Oral)   Resp 18   Ht 5' 5.5\" (1.664 m)   Wt 126 lb (57.2 kg)   LMP 12/31/2008   SpO2 98%   BMI thrombosis. Review:      Vitamin D deficiency-taking vitamin D 2000 units daily. Vit D level 34.5 on 8/4/17. Healthcare maintenance:  Vaccines- tdap 7/25/17. Gyn Dr Emmanuel Lopez. Pap normal 5/12/17.    Mammo-ordered by gyn, Last mammo at [facility] sleep). Disp: 30 tablet Rfl: 1   ULTRAVATE 0.05 % External Lotion CELINA THIN LAYER EXT AA QD Disp:  Rfl: 0   Cholecalciferol (VITAMIN D) 2000 units Oral Cap Take 1 capsule by mouth daily.   Disp:  Rfl:          Santos Cordova MD  9/8/2017

## 2017-09-11 ENCOUNTER — TELEPHONE (OUTPATIENT)
Dept: INTERNAL MEDICINE CLINIC | Facility: CLINIC | Age: 57
End: 2017-09-11

## 2017-09-11 NOTE — TELEPHONE ENCOUNTER
To nursing, please tell patient the 9/8/17 urinalysis shows a few bacteria and a small number of white cells (8 white cells) suggestive of a low-grade urinary infection. The urine culture however didn't grow bacteria on the culture plate.     If the [facility]

## 2017-09-12 NOTE — TELEPHONE ENCOUNTER
To Dr. Tabatha Martel - your message relayed to pt who verbalized understanding. Pt states she is \"much, much improved\" - will take last dose tomorrow - anything else needed?

## 2017-09-15 ENCOUNTER — PRIOR ORIGINAL RECORDS (OUTPATIENT)
Dept: OTHER | Age: 57
End: 2017-09-15

## 2017-09-15 ENCOUNTER — APPOINTMENT (OUTPATIENT)
Dept: LAB | Facility: HOSPITAL | Age: 57
End: 2017-09-15
Attending: INTERNAL MEDICINE
Payer: COMMERCIAL

## 2017-09-15 DIAGNOSIS — E87.1 HYPONATREMIA: ICD-10-CM

## 2017-09-15 DIAGNOSIS — I10 HYPERTENSION, ESSENTIAL: ICD-10-CM

## 2017-09-15 LAB
ANION GAP SERPL CALC-SCNC: 4 MMOL/L (ref 0–18)
BUN SERPL-MCNC: 19 MG/DL (ref 8–20)
BUN/CREAT SERPL: 15 (ref 10–20)
CALCIUM SERPL-MCNC: 8.7 MG/DL (ref 8.5–10.5)
CHLORIDE SERPL-SCNC: 109 MMOL/L (ref 95–110)
CO2 SERPL-SCNC: 25 MMOL/L (ref 22–32)
CREAT SERPL-MCNC: 1.27 MG/DL (ref 0.5–1.5)
GLUCOSE SERPL-MCNC: 112 MG/DL (ref 70–99)
OSMOLALITY UR CALC.SUM OF ELEC: 289 MOSM/KG (ref 275–295)
POTASSIUM SERPL-SCNC: 3.9 MMOL/L (ref 3.3–5.1)
SODIUM SERPL-SCNC: 138 MMOL/L (ref 136–144)

## 2017-09-15 PROCEDURE — 80048 BASIC METABOLIC PNL TOTAL CA: CPT

## 2017-09-15 PROCEDURE — 36415 COLL VENOUS BLD VENIPUNCTURE: CPT

## 2017-09-18 LAB
BUN: 19 MG/DL
CALCIUM: 8.7 MG/DL
CHLORIDE: 109 MEQ/L
CREATININE, SERUM: 1.27 MG/DL
GLUCOSE: 112 MG/DL
POTASSIUM, SERUM: 3.9 MEQ/L
SODIUM: 138 MEQ/L

## 2017-09-21 ENCOUNTER — TELEPHONE (OUTPATIENT)
Dept: INTERNAL MEDICINE CLINIC | Facility: CLINIC | Age: 57
End: 2017-09-21

## 2017-09-21 DIAGNOSIS — I10 ESSENTIAL HYPERTENSION: Primary | ICD-10-CM

## 2017-09-21 RX ORDER — LISINOPRIL 5 MG/1
5 TABLET ORAL DAILY
Qty: 1 TABLET | Refills: 0 | COMMUNITY
Start: 2017-09-21 | End: 2017-12-18

## 2017-09-21 NOTE — TELEPHONE ENCOUNTER
To nursing,   please tell pt lab done on 9/15/44-OLY-nzonhna show her sodium is back to normal. (Na 138--was 129 last mo--has gone off dyazide and should stay off it).    Her kidney function levels however changed slightly compared to the last check last mo

## 2017-09-22 ENCOUNTER — PRIOR ORIGINAL RECORDS (OUTPATIENT)
Dept: OTHER | Age: 57
End: 2017-09-22

## 2017-09-27 ENCOUNTER — HOSPITAL ENCOUNTER (OUTPATIENT)
Dept: ULTRASOUND IMAGING | Facility: HOSPITAL | Age: 57
Discharge: HOME | End: 2017-09-27
Attending: INTERNAL MEDICINE
Payer: COMMERCIAL

## 2017-09-27 DIAGNOSIS — I74.9 EMBOLIC INFARCTION (HCC): ICD-10-CM

## 2017-09-27 PROCEDURE — 93931 UPPER EXTREMITY STUDY: CPT | Performed by: INTERNAL MEDICINE

## 2017-09-29 ENCOUNTER — PRIOR ORIGINAL RECORDS (OUTPATIENT)
Dept: OTHER | Age: 57
End: 2017-09-29

## 2017-10-03 ENCOUNTER — PRIOR ORIGINAL RECORDS (OUTPATIENT)
Dept: OTHER | Age: 57
End: 2017-10-03

## 2017-10-03 ENCOUNTER — APPOINTMENT (OUTPATIENT)
Dept: LAB | Facility: HOSPITAL | Age: 57
End: 2017-10-03
Attending: INTERNAL MEDICINE
Payer: COMMERCIAL

## 2017-10-03 DIAGNOSIS — I10 ESSENTIAL HYPERTENSION: ICD-10-CM

## 2017-10-03 PROCEDURE — 36415 COLL VENOUS BLD VENIPUNCTURE: CPT

## 2017-10-03 PROCEDURE — 80048 BASIC METABOLIC PNL TOTAL CA: CPT

## 2017-10-04 ENCOUNTER — TELEPHONE (OUTPATIENT)
Dept: INTERNAL MEDICINE CLINIC | Facility: CLINIC | Age: 57
End: 2017-10-04

## 2017-10-04 NOTE — TELEPHONE ENCOUNTER
To nursing,   please tell patient lab done 12/8/79–GALINA metabolic panel–results are okay. The sodium level remains normal at 136. Creatinine level which checks kidney function is improved at 1.12 --was 1.27 on 9/15/17  See me 11/10/17 as planned.   Xiang King

## 2017-10-05 ENCOUNTER — APPOINTMENT (OUTPATIENT)
Dept: LAB | Facility: HOSPITAL | Age: 57
End: 2017-10-05
Attending: INTERNAL MEDICINE
Payer: COMMERCIAL

## 2017-10-05 DIAGNOSIS — I42.0 DILATED CARDIOMYOPATHY (HCC): ICD-10-CM

## 2017-10-05 LAB
BUN: 23 MG/DL
CALCIUM: 8.6 MG/DL
CHLORIDE: 105 MEQ/L
CREATININE, SERUM: 1.12 MG/DL
GLUCOSE: 93 MG/DL
POTASSIUM, SERUM: 3.9 MEQ/L
SODIUM: 136 MEQ/L

## 2017-10-05 PROCEDURE — 80048 BASIC METABOLIC PNL TOTAL CA: CPT

## 2017-10-05 PROCEDURE — 36415 COLL VENOUS BLD VENIPUNCTURE: CPT

## 2017-10-06 ENCOUNTER — PRIOR ORIGINAL RECORDS (OUTPATIENT)
Dept: OTHER | Age: 57
End: 2017-10-06

## 2017-10-09 LAB
BUN: 14 MG/DL
CALCIUM: 9 MG/DL
CHLORIDE: 108 MEQ/L
CREATININE, SERUM: 0.94 MG/DL
GLUCOSE: 96 MG/DL
POTASSIUM, SERUM: 4.2 MEQ/L
SODIUM: 138 MEQ/L

## 2017-10-13 ENCOUNTER — PRIOR ORIGINAL RECORDS (OUTPATIENT)
Dept: OTHER | Age: 57
End: 2017-10-13

## 2017-10-31 RX ORDER — DICYCLOMINE HYDROCHLORIDE 10 MG/1
CAPSULE ORAL
Qty: 100 CAPSULE | Refills: 3 | Status: SHIPPED | OUTPATIENT
Start: 2017-10-31 | End: 2018-05-31

## 2017-10-31 NOTE — TELEPHONE ENCOUNTER
At [address] 9/8/17 Dicyclomine was added as IBS trial-   To DR. Alvarado would you like to refill as maintenance?

## 2017-10-31 NOTE — TELEPHONE ENCOUNTER
I sent refill to [facility] in [place] for   Bentyl 10 mg 3 times daily as needed for IBS #100 with 3 refills.

## 2017-11-10 ENCOUNTER — OFFICE VISIT (OUTPATIENT)
Dept: INTERNAL MEDICINE CLINIC | Facility: CLINIC | Age: 57
End: 2017-11-10

## 2017-11-10 ENCOUNTER — APPOINTMENT (OUTPATIENT)
Dept: LAB | Age: 57
End: 2017-11-10
Attending: INTERNAL MEDICINE
Payer: COMMERCIAL

## 2017-11-10 VITALS
WEIGHT: 127.5 LBS | BODY MASS INDEX: 20.99 KG/M2 | TEMPERATURE: 98 F | HEART RATE: 79 BPM | DIASTOLIC BLOOD PRESSURE: 60 MMHG | SYSTOLIC BLOOD PRESSURE: 114 MMHG | HEIGHT: 65.5 IN | OXYGEN SATURATION: 98 %

## 2017-11-10 DIAGNOSIS — I10 HYPERTENSION, ESSENTIAL: Primary | ICD-10-CM

## 2017-11-10 DIAGNOSIS — R14.0 ABDOMINAL BLOATING: ICD-10-CM

## 2017-11-10 DIAGNOSIS — F17.200 SMOKER: ICD-10-CM

## 2017-11-10 PROCEDURE — 83516 IMMUNOASSAY NONANTIBODY: CPT

## 2017-11-10 PROCEDURE — 99212 OFFICE O/P EST SF 10 MIN: CPT | Performed by: INTERNAL MEDICINE

## 2017-11-10 PROCEDURE — 36415 COLL VENOUS BLD VENIPUNCTURE: CPT

## 2017-11-10 PROCEDURE — 90686 IIV4 VACC NO PRSV 0.5 ML IM: CPT | Performed by: INTERNAL MEDICINE

## 2017-11-10 PROCEDURE — 82784 ASSAY IGA/IGD/IGG/IGM EACH: CPT

## 2017-11-10 PROCEDURE — 90471 IMMUNIZATION ADMIN: CPT | Performed by: INTERNAL MEDICINE

## 2017-11-10 PROCEDURE — 99213 OFFICE O/P EST LOW 20 MIN: CPT | Performed by: INTERNAL MEDICINE

## 2017-11-10 NOTE — PROGRESS NOTES
Ila Delarosa is a 59year old female who presents for     Check at 2 months     Hypertension-Tolerating metoprolol ER 75 mg daily and lisinopril 5 mg daily (dose was cut from 7.5 mg after GFR 43 in Sept. Last GFR >60 on 10/5/17.   Home BPs are 106-122 over left foot bunion (surgery)   • Osteopenia ~2014    DEXA per GYN at office   • PVC's (premature ventricular contractions) 07/2017    Frequent PVCs on EKG 7/25/17   • Radial artery thrombosis, right ([address] 08/2017    Right radial artery thrombosis post cardia lb (55.8 kg)  07/25/17 : 128 lb (58.1 kg)  05/12/17 : 132 lb (59.9 kg)      General: appears well nourished and in no acute distress  Lungs: clear to auscultation  Heart: regular rhythm, S1S2 normal without murmur  Breasts: no mass or axillary adenopathy a Vit D UA--results overall ok--. (Unclear if BMP by Dr. Ilda Dunbar 8/24/17 results are spurious- creatinine 2.39 BUN 53).  8/28/17–BMP per Dr. Susan Reynolds 129, K3.5, creat 0.87, BUN 18 GFR >60.   9/15/21-WVN-byeol 1.27 GFR 43.  Cut back lisinopril to 5 mg

## 2017-11-15 ENCOUNTER — MYAURORA ACCOUNT LINK (OUTPATIENT)
Dept: OTHER | Age: 57
End: 2017-11-15

## 2017-11-15 ENCOUNTER — PRIOR ORIGINAL RECORDS (OUTPATIENT)
Dept: OTHER | Age: 57
End: 2017-11-15

## 2017-11-16 ENCOUNTER — TELEPHONE (OUTPATIENT)
Dept: INTERNAL MEDICINE CLINIC | Facility: CLINIC | Age: 57
End: 2017-11-16

## 2017-11-16 NOTE — TELEPHONE ENCOUNTER
To nursing. Please tell pt the blood test screening for celiac disease is negative (normal). Thanks.      Note to self--11/10/17--tissue transglutamidase IgA Ab and IgA level--normal.

## 2017-11-16 NOTE — TELEPHONE ENCOUNTER
Patient returned call. Relayed Dr. Kali Doyle' message that the blood test screening for celiac disease is negative (normal). Patient expressed relief and verbalized understanding.

## 2017-11-20 ENCOUNTER — PRIOR ORIGINAL RECORDS (OUTPATIENT)
Dept: OTHER | Age: 57
End: 2017-11-20

## 2017-11-22 ENCOUNTER — PRIOR ORIGINAL RECORDS (OUTPATIENT)
Dept: OTHER | Age: 57
End: 2017-11-22

## 2017-11-28 ENCOUNTER — PRIOR ORIGINAL RECORDS (OUTPATIENT)
Dept: OTHER | Age: 57
End: 2017-11-28

## 2017-11-29 ENCOUNTER — PRIOR ORIGINAL RECORDS (OUTPATIENT)
Dept: OTHER | Age: 57
End: 2017-11-29

## 2017-11-30 DIAGNOSIS — I10 HYPERTENSION, ESSENTIAL: ICD-10-CM

## 2017-11-30 RX ORDER — METOPROLOL SUCCINATE 50 MG/1
75 TABLET, EXTENDED RELEASE ORAL DAILY
Qty: 135 TABLET | Refills: 3 | Status: SHIPPED | OUTPATIENT
Start: 2017-11-30 | End: 2017-12-18

## 2017-12-13 ENCOUNTER — PRIOR ORIGINAL RECORDS (OUTPATIENT)
Dept: OTHER | Age: 57
End: 2017-12-13

## 2017-12-13 ENCOUNTER — HOSPITAL ENCOUNTER (OUTPATIENT)
Dept: MRI IMAGING | Facility: HOSPITAL | Age: 57
Discharge: HOME | End: 2017-12-13
Attending: INTERNAL MEDICINE
Payer: COMMERCIAL

## 2017-12-13 DIAGNOSIS — I70.1 ATHEROSCLEROSIS OF RENAL ARTERY (HCC): ICD-10-CM

## 2017-12-13 PROCEDURE — A9575 INJ GADOTERATE MEGLUMI 0.1ML: HCPCS | Performed by: INTERNAL MEDICINE

## 2017-12-13 PROCEDURE — 82565 ASSAY OF CREATININE: CPT

## 2017-12-13 PROCEDURE — 74185 MRA ABD W OR W/O CNTRST: CPT | Performed by: INTERNAL MEDICINE

## 2017-12-14 ENCOUNTER — PRIOR ORIGINAL RECORDS (OUTPATIENT)
Dept: OTHER | Age: 57
End: 2017-12-14

## 2017-12-14 LAB — CREATININE, SERUM: 1.6 MG/DL

## 2017-12-17 ENCOUNTER — TELEPHONE (OUTPATIENT)
Dept: INTERNAL MEDICINE CLINIC | Facility: CLINIC | Age: 57
End: 2017-12-17

## 2017-12-17 DIAGNOSIS — I10 HYPERTENSION, ESSENTIAL: ICD-10-CM

## 2017-12-17 DIAGNOSIS — N28.9 RENAL INSUFFICIENCY: ICD-10-CM

## 2017-12-17 DIAGNOSIS — N28.1 RENAL CYST: ICD-10-CM

## 2017-12-17 DIAGNOSIS — R93.429 ABNORMAL MRI, KIDNEY: Primary | ICD-10-CM

## 2017-12-17 NOTE — TELEPHONE ENCOUNTER
To nursing, please tell pt I reviewed the result of 12/14/17-MRA abdomen ordered by Dr Taz Roberts. As she indicated to me when I ran into her yesterday, there is 75-80% R renal artery and >90% L renal artery narrowing.     1) Go ahead and see the specialist

## 2017-12-18 RX ORDER — METOPROLOL SUCCINATE 50 MG/1
100 TABLET, EXTENDED RELEASE ORAL NIGHTLY
Qty: 135 TABLET | Refills: 3 | Status: ON HOLD | COMMUNITY
Start: 2017-12-18 | End: 2017-12-30

## 2017-12-18 NOTE — TELEPHONE ENCOUNTER
As FYI to DR. GUTIERREZ patient called back - relayed message and she verbalized understanding. Med module updated . She wants to thank DR. GUTIERREZ for F/U and wish Happy Holidays

## 2017-12-19 ENCOUNTER — HOSPITAL ENCOUNTER (OUTPATIENT)
Dept: ULTRASOUND IMAGING | Facility: HOSPITAL | Age: 57
Discharge: HOME | End: 2017-12-19
Attending: INTERNAL MEDICINE
Payer: COMMERCIAL

## 2017-12-19 DIAGNOSIS — N28.1 RENAL CYST: ICD-10-CM

## 2017-12-19 DIAGNOSIS — R93.429 ABNORMAL MRI, KIDNEY: ICD-10-CM

## 2017-12-19 PROCEDURE — 76770 US EXAM ABDO BACK WALL COMP: CPT | Performed by: INTERNAL MEDICINE

## 2017-12-20 ENCOUNTER — MYAURORA ACCOUNT LINK (OUTPATIENT)
Dept: OTHER | Age: 57
End: 2017-12-20

## 2017-12-20 ENCOUNTER — PRIOR ORIGINAL RECORDS (OUTPATIENT)
Dept: OTHER | Age: 57
End: 2017-12-20

## 2017-12-22 ENCOUNTER — TELEPHONE (OUTPATIENT)
Dept: INTERNAL MEDICINE CLINIC | Facility: CLINIC | Age: 57
End: 2017-12-22

## 2017-12-22 DIAGNOSIS — R31.0 HEMATURIA, GROSS: Primary | ICD-10-CM

## 2017-12-22 NOTE — TELEPHONE ENCOUNTER
I called pt. Ultrasound kidneys/bladder 12/20/17–simple appearing cysts in the bilateral kidneys.   (US done to follow-up 1.1 cm right kidney cyst on MRA abdomen 12/14/17)  I discussed I received a note from Mira WANG at [facility] [address] [facility] 1

## 2017-12-26 ENCOUNTER — LAB ENCOUNTER (OUTPATIENT)
Dept: LAB | Facility: HOSPITAL | Age: 57
End: 2017-12-26
Attending: INTERNAL MEDICINE
Payer: COMMERCIAL

## 2017-12-26 ENCOUNTER — HOSPITAL ENCOUNTER (OUTPATIENT)
Dept: GENERAL RADIOLOGY | Facility: HOSPITAL | Age: 57
Discharge: HOME | End: 2017-12-26
Attending: INTERNAL MEDICINE
Payer: COMMERCIAL

## 2017-12-26 ENCOUNTER — PRIOR ORIGINAL RECORDS (OUTPATIENT)
Dept: OTHER | Age: 57
End: 2017-12-26

## 2017-12-26 DIAGNOSIS — I42.0 PRIMARY DILATED CARDIOMYOPATHY (HCC): ICD-10-CM

## 2017-12-26 DIAGNOSIS — Z01.810 PRE-OPERATIVE CARDIOVASCULAR EXAMINATION: ICD-10-CM

## 2017-12-26 DIAGNOSIS — I74.9 EMBOLISM AND THROMBOSIS OF UNSPECIFIED ARTERY (HCC): ICD-10-CM

## 2017-12-26 DIAGNOSIS — I70.1 RENAL ARTERY STENOSIS OF UNKNOWN CAUSE (HCC): ICD-10-CM

## 2017-12-26 DIAGNOSIS — Z01.811 PRE-OP CHEST EXAM: Primary | ICD-10-CM

## 2017-12-26 DIAGNOSIS — I10 ESSENTIAL HYPERTENSION, BENIGN: ICD-10-CM

## 2017-12-26 PROCEDURE — 71020 XR CHEST PA + LAT CHEST (CPT=71020): CPT | Performed by: INTERNAL MEDICINE

## 2017-12-26 PROCEDURE — 93010 ELECTROCARDIOGRAM REPORT: CPT | Performed by: INTERNAL MEDICINE

## 2017-12-26 PROCEDURE — 36415 COLL VENOUS BLD VENIPUNCTURE: CPT

## 2017-12-26 PROCEDURE — 81001 URINALYSIS AUTO W/SCOPE: CPT | Performed by: INTERNAL MEDICINE

## 2017-12-26 PROCEDURE — 85025 COMPLETE CBC W/AUTO DIFF WBC: CPT

## 2017-12-26 PROCEDURE — 80048 BASIC METABOLIC PNL TOTAL CA: CPT

## 2017-12-26 PROCEDURE — 93005 ELECTROCARDIOGRAM TRACING: CPT

## 2017-12-27 ENCOUNTER — TELEPHONE (OUTPATIENT)
Dept: INTERNAL MEDICINE CLINIC | Facility: CLINIC | Age: 57
End: 2017-12-27

## 2017-12-27 ENCOUNTER — PRIOR ORIGINAL RECORDS (OUTPATIENT)
Dept: OTHER | Age: 57
End: 2017-12-27

## 2017-12-27 LAB
BUN: 15 MG/DL
CALCIUM: 9.3 MG/DL
CHLORIDE: 103 MEQ/L
CREATININE, SERUM: 1.15 MG/DL
GLUCOSE: 132 MG/DL
HEMATOCRIT: 34.8 %
HEMOGLOBIN: 12.2 G/DL
MCH: 31.4 PG
MCHC: 34.9 G/DL
MCV: 90 FL
PLATELETS: 241 K/UL
POTASSIUM, SERUM: 3.2 MEQ/L
RED BLOOD COUNT: 3.87 X 10-6/U
SODIUM: 137 MEQ/L
WHITE BLOOD COUNT: 9.6 X 10-3/U

## 2017-12-27 NOTE — TELEPHONE ENCOUNTER
To nursing,   please tell patient lab done on 12/26/17–urinalysis result is okay.     The blood test done per Dr. Jhoan Chau CBC and BMP–results are okay/stable to the past.  Her potassium level is just a little low (K3.2) and glucose is a little elevated at 1

## 2017-12-28 RX ORDER — SODIUM CHLORIDE 9 MG/ML
INJECTION, SOLUTION INTRAVENOUS ONCE
Status: COMPLETED | OUTPATIENT
Start: 2017-12-29 | End: 2017-12-30

## 2017-12-28 NOTE — TELEPHONE ENCOUNTER
Dr. Magui Ibanez message relayed to patient. She verbalized understanding of information and instructions.

## 2017-12-29 ENCOUNTER — APPOINTMENT (OUTPATIENT)
Dept: INTERVENTIONAL RADIOLOGY/VASCULAR | Facility: HOSPITAL | Age: 57
End: 2017-12-29
Attending: INTERNAL MEDICINE
Payer: COMMERCIAL

## 2017-12-29 ENCOUNTER — APPOINTMENT (OUTPATIENT)
Dept: CT IMAGING | Facility: HOSPITAL | Age: 57
End: 2017-12-29
Attending: INTERNAL MEDICINE
Payer: COMMERCIAL

## 2017-12-29 ENCOUNTER — HOSPITAL ENCOUNTER (OUTPATIENT)
Dept: INTERVENTIONAL RADIOLOGY/VASCULAR | Facility: HOSPITAL | Age: 57
Setting detail: OBSERVATION
Discharge: HOME | End: 2017-12-30
Attending: INTERNAL MEDICINE | Admitting: INTERNAL MEDICINE
Payer: COMMERCIAL

## 2017-12-29 DIAGNOSIS — I74.9 EMBOLISM AND THROMBOSIS OF ARTERY (HCC): ICD-10-CM

## 2017-12-29 DIAGNOSIS — I10 HTN (HYPERTENSION): ICD-10-CM

## 2017-12-29 DIAGNOSIS — I87.1 RENAL VEIN STENOSIS: ICD-10-CM

## 2017-12-29 DIAGNOSIS — I10 HYPERTENSION, ESSENTIAL: ICD-10-CM

## 2017-12-29 LAB
ANION GAP SERPL CALC-SCNC: 8 MMOL/L (ref 0–18)
ANTIBODY SCREEN: NEGATIVE
BUN SERPL-MCNC: 15 MG/DL (ref 8–20)
BUN/CREAT SERPL: 16.1 (ref 10–20)
CALCIUM SERPL-MCNC: 8.9 MG/DL (ref 8.5–10.5)
CHLORIDE SERPL-SCNC: 106 MMOL/L (ref 95–110)
CO2 SERPL-SCNC: 26 MMOL/L (ref 22–32)
CREAT SERPL-MCNC: 0.93 MG/DL (ref 0.5–1.5)
GLUCOSE SERPL-MCNC: 93 MG/DL (ref 70–99)
HCT VFR BLD AUTO: 26.2 % (ref 35–48)
HCT VFR BLD AUTO: 26.2 % (ref 35–48)
HCT VFR BLD AUTO: 28.4 % (ref 35–48)
HCT VFR BLD AUTO: 31.2 % (ref 35–48)
HGB BLD-MCNC: 10.7 G/DL (ref 12–16)
HGB BLD-MCNC: 8.8 G/DL (ref 12–16)
HGB BLD-MCNC: 9 G/DL (ref 12–16)
HGB BLD-MCNC: 9.8 G/DL (ref 12–16)
OSMOLALITY UR CALC.SUM OF ELEC: 291 MOSM/KG (ref 275–295)
POTASSIUM SERPL-SCNC: 4.3 MMOL/L (ref 3.3–5.1)
RH BLOOD TYPE: POSITIVE
SODIUM SERPL-SCNC: 140 MMOL/L (ref 136–144)

## 2017-12-29 PROCEDURE — 99152 MOD SED SAME PHYS/QHP 5/>YRS: CPT

## 2017-12-29 PROCEDURE — 85014 HEMATOCRIT: CPT | Performed by: RADIOLOGY

## 2017-12-29 PROCEDURE — 99153 MOD SED SAME PHYS/QHP EA: CPT

## 2017-12-29 PROCEDURE — 85018 HEMOGLOBIN: CPT | Performed by: RADIOLOGY

## 2017-12-29 PROCEDURE — 85014 HEMATOCRIT: CPT | Performed by: INTERNAL MEDICINE

## 2017-12-29 PROCEDURE — 74176 CT ABD & PELVIS W/O CONTRAST: CPT | Performed by: INTERNAL MEDICINE

## 2017-12-29 PROCEDURE — 86850 RBC ANTIBODY SCREEN: CPT | Performed by: RADIOLOGY

## 2017-12-29 PROCEDURE — 04793ZZ DILATION OF RIGHT RENAL ARTERY, PERCUTANEOUS APPROACH: ICD-10-PCS | Performed by: INTERNAL MEDICINE

## 2017-12-29 PROCEDURE — 36252 INS CATH REN ART 1ST BILAT: CPT

## 2017-12-29 PROCEDURE — 85018 HEMOGLOBIN: CPT | Performed by: INTERNAL MEDICINE

## 2017-12-29 PROCEDURE — B4181ZZ FLUOROSCOPY OF BILATERAL RENAL ARTERIES USING LOW OSMOLAR CONTRAST: ICD-10-PCS | Performed by: INTERNAL MEDICINE

## 2017-12-29 PROCEDURE — 86901 BLOOD TYPING SEROLOGIC RH(D): CPT | Performed by: RADIOLOGY

## 2017-12-29 PROCEDURE — 36253 INS CATH REN ART 2ND+ UNILAT: CPT

## 2017-12-29 PROCEDURE — 37236 OPEN/PERQ PLACE STENT 1ST: CPT

## 2017-12-29 PROCEDURE — 80048 BASIC METABOLIC PNL TOTAL CA: CPT | Performed by: INTERNAL MEDICINE

## 2017-12-29 PROCEDURE — 86900 BLOOD TYPING SEROLOGIC ABO: CPT | Performed by: RADIOLOGY

## 2017-12-29 PROCEDURE — 047A3ZZ DILATION OF LEFT RENAL ARTERY, PERCUTANEOUS APPROACH: ICD-10-PCS | Performed by: INTERNAL MEDICINE

## 2017-12-29 PROCEDURE — B4171ZZ FLUOROSCOPY OF LEFT RENAL ARTERY USING LOW OSMOLAR CONTRAST: ICD-10-PCS | Performed by: RADIOLOGY

## 2017-12-29 RX ORDER — ACETAMINOPHEN AND CODEINE PHOSPHATE 300; 30 MG/1; MG/1
1-2 TABLET ORAL EVERY 4 HOURS PRN
Status: DISCONTINUED | OUTPATIENT
Start: 2017-12-29 | End: 2017-12-30

## 2017-12-29 RX ORDER — SODIUM CHLORIDE 9 MG/ML
INJECTION, SOLUTION INTRAVENOUS CONTINUOUS
Status: ACTIVE | OUTPATIENT
Start: 2017-12-29 | End: 2017-12-30

## 2017-12-29 RX ORDER — SODIUM CHLORIDE 9 MG/ML
INJECTION, SOLUTION INTRAVENOUS
Status: COMPLETED
Start: 2017-12-29 | End: 2017-12-29

## 2017-12-29 RX ORDER — MORPHINE SULFATE 4 MG/ML
2 INJECTION, SOLUTION INTRAMUSCULAR; INTRAVENOUS EVERY 4 HOURS PRN
Status: DISCONTINUED | OUTPATIENT
Start: 2017-12-29 | End: 2017-12-29

## 2017-12-29 RX ORDER — SODIUM CHLORIDE 9 MG/ML
INJECTION, SOLUTION INTRAVENOUS CONTINUOUS
Status: DISCONTINUED | OUTPATIENT
Start: 2017-12-29 | End: 2017-12-30

## 2017-12-29 RX ORDER — SODIUM CHLORIDE 9 MG/ML
INJECTION, SOLUTION INTRAVENOUS CONTINUOUS
Status: ACTIVE | OUTPATIENT
Start: 2017-12-29 | End: 2017-12-29

## 2017-12-29 RX ORDER — ONDANSETRON 2 MG/ML
INJECTION INTRAMUSCULAR; INTRAVENOUS
Status: COMPLETED
Start: 2017-12-29 | End: 2017-12-29

## 2017-12-29 RX ORDER — LIDOCAINE HYDROCHLORIDE 20 MG/ML
INJECTION, SOLUTION EPIDURAL; INFILTRATION; INTRACAUDAL; PERINEURAL
Status: COMPLETED
Start: 2017-12-29 | End: 2017-12-29

## 2017-12-29 RX ORDER — MORPHINE SULFATE 4 MG/ML
INJECTION, SOLUTION INTRAMUSCULAR; INTRAVENOUS
Status: DISPENSED
Start: 2017-12-29 | End: 2017-12-30

## 2017-12-29 RX ORDER — ACETAMINOPHEN AND CODEINE PHOSPHATE 300; 30 MG/1; MG/1
1-2 TABLET ORAL EVERY 4 HOURS PRN
Status: DISCONTINUED | OUTPATIENT
Start: 2017-12-29 | End: 2017-12-29

## 2017-12-29 RX ORDER — MIDAZOLAM HYDROCHLORIDE 1 MG/ML
INJECTION INTRAMUSCULAR; INTRAVENOUS
Status: COMPLETED
Start: 2017-12-29 | End: 2017-12-29

## 2017-12-29 RX ORDER — 0.9 % SODIUM CHLORIDE 0.9 %
VIAL (ML) INJECTION
Status: COMPLETED
Start: 2017-12-29 | End: 2017-12-29

## 2017-12-29 RX ORDER — HEPARIN SODIUM 1000 [USP'U]/ML
INJECTION, SOLUTION INTRAVENOUS; SUBCUTANEOUS
Status: COMPLETED
Start: 2017-12-29 | End: 2017-12-29

## 2017-12-29 RX ORDER — SODIUM CHLORIDE 9 MG/ML
INJECTION, SOLUTION INTRAVENOUS
Status: DISPENSED
Start: 2017-12-29 | End: 2017-12-30

## 2017-12-29 RX ORDER — ACETAMINOPHEN AND CODEINE PHOSPHATE 300; 30 MG/1; MG/1
TABLET ORAL
Status: COMPLETED
Start: 2017-12-29 | End: 2017-12-29

## 2017-12-29 RX ORDER — ONDANSETRON 2 MG/ML
4 INJECTION INTRAMUSCULAR; INTRAVENOUS EVERY 6 HOURS PRN
Status: DISCONTINUED | OUTPATIENT
Start: 2017-12-29 | End: 2017-12-29

## 2017-12-29 RX ORDER — MORPHINE SULFATE 4 MG/ML
2 INJECTION, SOLUTION INTRAMUSCULAR; INTRAVENOUS EVERY 2 HOUR PRN
Status: DISCONTINUED | OUTPATIENT
Start: 2017-12-29 | End: 2017-12-29

## 2017-12-29 RX ORDER — MORPHINE SULFATE 2 MG/ML
2 INJECTION, SOLUTION INTRAMUSCULAR; INTRAVENOUS EVERY 2 HOUR PRN
Status: DISCONTINUED | OUTPATIENT
Start: 2017-12-29 | End: 2017-12-30

## 2017-12-29 RX ADMIN — ACETAMINOPHEN AND CODEINE PHOSPHATE 1 TABLET: 300; 30 TABLET ORAL at 20:50:00

## 2017-12-29 RX ADMIN — MORPHINE SULFATE 2 MG: 4 INJECTION, SOLUTION INTRAMUSCULAR; INTRAVENOUS at 12:10:00

## 2017-12-29 RX ADMIN — 0.9 % SODIUM CHLORIDE 10 ML: 0.9 % VIAL (ML) INJECTION at 22:30:00

## 2017-12-29 RX ADMIN — MORPHINE SULFATE 2 MG: 2 INJECTION, SOLUTION INTRAMUSCULAR; INTRAVENOUS at 22:21:00

## 2017-12-29 RX ADMIN — SODIUM CHLORIDE: 9 INJECTION, SOLUTION INTRAVENOUS at 13:00:00

## 2017-12-29 RX ADMIN — MORPHINE SULFATE 2 MG: 4 INJECTION, SOLUTION INTRAMUSCULAR; INTRAVENOUS at 14:25:00

## 2017-12-29 RX ADMIN — SODIUM CHLORIDE: 9 INJECTION, SOLUTION INTRAVENOUS at 08:00:00

## 2017-12-29 RX ADMIN — SODIUM CHLORIDE: 9 INJECTION, SOLUTION INTRAVENOUS at 16:58:00

## 2017-12-29 RX ADMIN — ACETAMINOPHEN AND CODEINE PHOSPHATE 1 TABLET: 300; 30 TABLET ORAL at 11:18:00

## 2017-12-29 RX ADMIN — ACETAMINOPHEN AND CODEINE PHOSPHATE: 300; 30 TABLET ORAL at 11:44:00

## 2017-12-29 RX ADMIN — ONDANSETRON 4 MG: 2 INJECTION INTRAMUSCULAR; INTRAVENOUS at 12:08:00

## 2017-12-29 RX ADMIN — MORPHINE SULFATE 2 MG: 4 INJECTION, SOLUTION INTRAMUSCULAR; INTRAVENOUS at 18:50:00

## 2017-12-29 NOTE — PLAN OF CARE
Pt developed to left back Pain not relieved Pt had decrease b/p and IVF were opened CT completed and DR Jona Butler aware of results Pt at this time has pain at 5-6 Dr Jona Butler has talked with pt and  and understand  Procedure to be done

## 2017-12-29 NOTE — PRE-SEDATION ASSESSMENT
Pre-Procedure Sedation Assessment    Chief Complaint/Indication for procedure: bilateral renal artery stenosis    History of snoring or sleep or apnea?    No    History of previous problems with anesthesia or sedation  No    Physical Findings:  Neck: nl ROM

## 2017-12-29 NOTE — BRIEF PROCEDURE NOTE
[facility] - [facility]    [facility] Cardiac Cath Procedure Note  Clifford Prom Patient Status:  Outpatient in a Bed    1960 MRN [id number]   Location [facility] Attending Judie Hernandez MD   Hosp Day # 0 MARYAN Ramachandran

## 2017-12-30 VITALS
OXYGEN SATURATION: 95 % | RESPIRATION RATE: 18 BRPM | DIASTOLIC BLOOD PRESSURE: 58 MMHG | HEART RATE: 60 BPM | BODY MASS INDEX: 21.35 KG/M2 | HEIGHT: 66 IN | WEIGHT: 132.88 LBS | TEMPERATURE: 98 F | SYSTOLIC BLOOD PRESSURE: 102 MMHG

## 2017-12-30 LAB
ANION GAP SERPL CALC-SCNC: 2 MMOL/L (ref 0–18)
BUN SERPL-MCNC: 10 MG/DL (ref 8–20)
BUN/CREAT SERPL: 9.6 (ref 10–20)
CALCIUM SERPL-MCNC: 8.1 MG/DL (ref 8.5–10.5)
CHLORIDE SERPL-SCNC: 110 MMOL/L (ref 95–110)
CO2 SERPL-SCNC: 26 MMOL/L (ref 22–32)
CREAT SERPL-MCNC: 1.04 MG/DL (ref 0.5–1.5)
ERYTHROCYTE [DISTWIDTH] IN BLOOD BY AUTOMATED COUNT: 13.4 % (ref 11–15)
GLUCOSE SERPL-MCNC: 94 MG/DL (ref 70–99)
HCT VFR BLD AUTO: 25.3 % (ref 35–48)
HCT VFR BLD AUTO: 25.4 % (ref 35–48)
HCT VFR BLD AUTO: 26.3 % (ref 35–48)
HCT VFR BLD AUTO: 26.7 % (ref 35–48)
HGB BLD-MCNC: 8.6 G/DL (ref 12–16)
HGB BLD-MCNC: 8.8 G/DL (ref 12–16)
HGB BLD-MCNC: 8.9 G/DL (ref 12–16)
HGB BLD-MCNC: 8.9 G/DL (ref 12–16)
MCH RBC QN AUTO: 31.5 PG (ref 27–32)
MCHC RBC AUTO-ENTMCNC: 34.6 G/DL (ref 32–37)
MCV RBC AUTO: 90.9 FL (ref 80–100)
OSMOLALITY UR CALC.SUM OF ELEC: 285 MOSM/KG (ref 275–295)
PLATELET # BLD AUTO: 173 K/UL (ref 140–400)
PMV BLD AUTO: 7 FL (ref 7.4–10.3)
POTASSIUM SERPL-SCNC: 4 MMOL/L (ref 3.3–5.1)
RBC # BLD AUTO: 2.79 M/UL (ref 3.7–5.4)
SODIUM SERPL-SCNC: 138 MMOL/L (ref 136–144)
WBC # BLD AUTO: 8.1 K/UL (ref 4–11)

## 2017-12-30 PROCEDURE — 85018 HEMOGLOBIN: CPT | Performed by: RADIOLOGY

## 2017-12-30 PROCEDURE — 80048 BASIC METABOLIC PNL TOTAL CA: CPT | Performed by: INTERNAL MEDICINE

## 2017-12-30 PROCEDURE — 85027 COMPLETE CBC AUTOMATED: CPT | Performed by: INTERNAL MEDICINE

## 2017-12-30 PROCEDURE — 85014 HEMATOCRIT: CPT | Performed by: RADIOLOGY

## 2017-12-30 RX ORDER — 0.9 % SODIUM CHLORIDE 0.9 %
VIAL (ML) INJECTION
Status: DISCONTINUED
Start: 2017-12-30 | End: 2017-12-30

## 2017-12-30 RX ORDER — ASPIRIN 81 MG/1
81 TABLET ORAL DAILY
Status: DISCONTINUED | OUTPATIENT
Start: 2018-01-06 | End: 2017-12-30

## 2017-12-30 RX ORDER — SODIUM CHLORIDE 9 MG/ML
INJECTION, SOLUTION INTRAVENOUS
Status: DISCONTINUED
Start: 2017-12-30 | End: 2017-12-30

## 2017-12-30 RX ORDER — ASPIRIN 81 MG/1
81 TABLET ORAL DAILY
Qty: 30 TABLET | Refills: 0 | Status: SHIPPED | OUTPATIENT
Start: 2018-01-06

## 2017-12-30 RX ORDER — METOPROLOL SUCCINATE 50 MG/1
25 TABLET, EXTENDED RELEASE ORAL NIGHTLY
Qty: 135 TABLET | Refills: 3 | Status: SHIPPED | OUTPATIENT
Start: 2017-12-30 | End: 2017-12-30

## 2017-12-30 RX ORDER — METOPROLOL SUCCINATE 50 MG/1
25 TABLET, EXTENDED RELEASE ORAL NIGHTLY
Qty: 30 TABLET | Refills: 1 | Status: SHIPPED | OUTPATIENT
Start: 2017-12-30 | End: 2019-10-25 | Stop reason: DRUGHIGH

## 2017-12-30 RX ADMIN — MORPHINE SULFATE 2 MG: 2 INJECTION, SOLUTION INTRAMUSCULAR; INTRAVENOUS at 00:32:00

## 2017-12-30 RX ADMIN — ACETAMINOPHEN AND CODEINE PHOSPHATE 2 TABLET: 300; 30 TABLET ORAL at 07:33:00

## 2017-12-30 RX ADMIN — SODIUM CHLORIDE: 9 INJECTION, SOLUTION INTRAVENOUS at 03:31:00

## 2017-12-30 RX ADMIN — ACETAMINOPHEN AND CODEINE PHOSPHATE 2 TABLET: 300; 30 TABLET ORAL at 03:30:00

## 2017-12-30 NOTE — PROCEDURES
[facility] HOSP - [facility]  Procedure Note    Óscar Blanton Patient Status:  Outpatient in a Bed    1960 MRN [id number]   Location [facility] Attending Favian Ragsdale MD   Hosp Day # 0 PCP Gonzalo Hernandez MD     Proced

## 2017-12-30 NOTE — HISTORICAL OFFICE NOTE
Carmelita Roberson  : 1960  ACCOUNT:  [id number]  [phone]  PCP: Dr. Xiomara Sandoval     TODAY'S DATE: 2017  DICTATED BY:  [Dr. Roslyn Teixeira: [Followup of Abnormal stress test, Followup of Embolism And Thrombosis Of Unspecifi Allergies    MEDICATIONS: Selected prescriptions see below    VITAL SIGNS: [B/P - 114/72 , Pulse - 77, Weight -  126, Height -   65 , BMI - 21.0 ]    CONS: well developed, well nourished. WEIGHT: BMI parameters reviewed and discussed.  HEAD/FACE: no trauma 1 tablet as needed                       08/08/17 Ultravate             0.05%     as directed                              08/08/17 Vitamin D             2000UNIT  1 tablet daily

## 2017-12-30 NOTE — PROGRESS NOTES
[facility]  Progress Note      Christopher Castelan Patient Status:  Observation    1960 MRN [id number]   Location [facility] Attending Arslan Stallworth MD   Hosp Day # 0 PCP Willem Godwin MD       Subjective:   Ms. Gaviota Covarrubias reports

## 2017-12-30 NOTE — PROGRESS NOTES
Sandi Lopez  [id number]  [unfilled]    Post procedure/ recovery hand-off report given to [facility]. Patient's vital signs stable, access site dry and intact, no signs and symptoms of bleeding/ hematoma.  Site check also done with Manuelita Oppenheim RN Pt in 510 with merly

## 2017-12-30 NOTE — PROGRESS NOTES
[facility] AND CLINICS  Progress Note    Javier Estrada Patient Status:  Observation    1960 MRN [id number]   Location [facility] Attending Lay Yu MD   Hosp Day # 0 PCP Sarah Thayer MD     Assessment:    1.   Renal artery stenosis,  Extremities: Without clubbing, cyanosis or edema. Peripheral pulses are 2+. Skin: Warm and dry.      Labs:    Lab Results  Component Value Date   WBC 8.1 12/30/2017   HGB 8.6 12/30/2017   HCT 25.3 12/30/2017    12/30/2017       Lab Results  [place]

## 2017-12-30 NOTE — PLAN OF CARE
CARDIOVASCULAR - ADULT    • Maintains optimal cardiac output and hemodynamic stability Progressing    • Absence of cardiac arrhythmias or at baseline Progressing        Peripheral vascular status not within defined limits    • Pt will have peripheral vascu

## 2017-12-31 ENCOUNTER — LAB ENCOUNTER (OUTPATIENT)
Dept: LAB | Facility: HOSPITAL | Age: 57
End: 2017-12-31
Attending: INTERNAL MEDICINE
Payer: COMMERCIAL

## 2017-12-31 ENCOUNTER — PRIOR ORIGINAL RECORDS (OUTPATIENT)
Dept: OTHER | Age: 57
End: 2017-12-31

## 2017-12-31 DIAGNOSIS — D64.9 ANEMIA: Primary | ICD-10-CM

## 2017-12-31 LAB
BASOPHILS # BLD: 0 K/UL (ref 0–0.2)
BASOPHILS NFR BLD: 0 %
EOSINOPHIL # BLD: 0.1 K/UL (ref 0–0.7)
EOSINOPHIL NFR BLD: 2 %
ERYTHROCYTE [DISTWIDTH] IN BLOOD BY AUTOMATED COUNT: 13.3 % (ref 11–15)
HCT VFR BLD AUTO: 27.3 % (ref 35–48)
HGB BLD-MCNC: 9.3 G/DL (ref 12–16)
LYMPHOCYTES # BLD: 1.1 K/UL (ref 1–4)
LYMPHOCYTES NFR BLD: 13 %
MCH RBC QN AUTO: 30.7 PG (ref 27–32)
MCHC RBC AUTO-ENTMCNC: 33.9 G/DL (ref 32–37)
MCV RBC AUTO: 90.6 FL (ref 80–100)
MONOCYTES # BLD: 0.8 K/UL (ref 0–1)
MONOCYTES NFR BLD: 9 %
NEUTROPHILS # BLD AUTO: 6.6 K/UL (ref 1.8–7.7)
NEUTROPHILS NFR BLD: 76 %
PLATELET # BLD AUTO: 204 K/UL (ref 140–400)
PMV BLD AUTO: 7 FL (ref 7.4–10.3)
RBC # BLD AUTO: 3.01 M/UL (ref 3.7–5.4)
WBC # BLD AUTO: 8.6 K/UL (ref 4–11)

## 2017-12-31 PROCEDURE — 36415 COLL VENOUS BLD VENIPUNCTURE: CPT

## 2017-12-31 PROCEDURE — 85025 COMPLETE CBC W/AUTO DIFF WBC: CPT

## 2018-01-01 NOTE — DISCHARGE SUMMARY
[facility]    PATIENT'S NAME: Sadie Hatfield   ATTENDING PHYSICIAN: Mago Teresa MD   PATIENT ACCOUNT#:   [id number]    LOCATION:  [address] [place] [address] RECORD #:   [id number]       YOB: 1960  ADMISSION DATE:       12/29/2017 110.    Dictated By Xavi Freitas. Quan Gage MD  d: 12/30/2017 10:34:04  t: 12/31/2017 20:53:14  Job [id number]  [facility]/    cc: Kunal Delgado MD

## 2018-01-02 ENCOUNTER — PATIENT OUTREACH (OUTPATIENT)
Dept: CASE MANAGEMENT | Age: 58
End: 2018-01-02

## 2018-01-02 ENCOUNTER — TELEPHONE (OUTPATIENT)
Dept: CARDIOLOGY UNIT | Facility: HOSPITAL | Age: 58
End: 2018-01-02

## 2018-01-02 ENCOUNTER — TELEPHONE (OUTPATIENT)
Dept: INTERNAL MEDICINE CLINIC | Facility: CLINIC | Age: 58
End: 2018-01-02

## 2018-01-02 NOTE — PROGRESS NOTES
Initial Post Discharge Follow Up   Discharge Date: 12/30/17  Contact Date: 1/2/2018    Consent Verification:  Assessment Completed With: Patient  HIPAA Verified?   Yes    Discharge Dx:  S/p Renal artery stenting bilaterally      Medications:     Current O instructions? • No  • Before leaving the hospital was your diagnoses explained to you? Yes  • Are you able to perform normal daily activities of living as you have prior to your hospital stay? yes      Referrals/orders at D/C:  Home Health ordered at D/C?

## 2018-01-03 ENCOUNTER — PRIOR ORIGINAL RECORDS (OUTPATIENT)
Dept: OTHER | Age: 58
End: 2018-01-03

## 2018-01-03 NOTE — TELEPHONE ENCOUNTER
I spoke to Dr. Bishop Coreas who called at 1 PM today with update about recent hospital stay. Patient was hospitalized 12/29/17 to 12/30/17 at [facility] AND [facility].  Had bilateral renal artery stents placed by Dr. Opal Salgado for renal artery stenosis.   She develo

## 2018-01-04 LAB
BUN: 10 MG/DL
CALCIUM: 8.1 MG/DL
CHLORIDE: 110 MEQ/L
CREATININE, SERUM: 1.04 MG/DL
GLUCOSE: 94 MG/DL
HEMATOCRIT: 27.3 %
HEMOGLOBIN: 9.3 G/DL
PLATELETS: 204 K/UL
POTASSIUM, SERUM: 4 MEQ/L
RED BLOOD COUNT: 3.01 X 10-6/U
SODIUM: 138 MEQ/L
WHITE BLOOD COUNT: 8.6 X 10-3/U

## 2018-01-08 ENCOUNTER — PRIOR ORIGINAL RECORDS (OUTPATIENT)
Dept: OTHER | Age: 58
End: 2018-01-08

## 2018-01-08 ENCOUNTER — MYAURORA ACCOUNT LINK (OUTPATIENT)
Dept: OTHER | Age: 58
End: 2018-01-08

## 2018-01-08 ENCOUNTER — LAB ENCOUNTER (OUTPATIENT)
Dept: LAB | Facility: HOSPITAL | Age: 58
End: 2018-01-08
Attending: INTERNAL MEDICINE
Payer: COMMERCIAL

## 2018-01-08 DIAGNOSIS — D64.9 ANEMIA: Primary | ICD-10-CM

## 2018-01-08 LAB
BASOPHILS # BLD: 0.1 K/UL (ref 0–0.2)
BASOPHILS NFR BLD: 1 %
EOSINOPHIL # BLD: 0.3 K/UL (ref 0–0.7)
EOSINOPHIL NFR BLD: 3 %
ERYTHROCYTE [DISTWIDTH] IN BLOOD BY AUTOMATED COUNT: 13.1 % (ref 11–15)
HCT VFR BLD AUTO: 27.8 % (ref 35–48)
HGB BLD-MCNC: 9.8 G/DL (ref 12–16)
LYMPHOCYTES # BLD: 2.2 K/UL (ref 1–4)
LYMPHOCYTES NFR BLD: 23 %
MCH RBC QN AUTO: 31 PG (ref 27–32)
MCHC RBC AUTO-ENTMCNC: 35.1 G/DL (ref 32–37)
MCV RBC AUTO: 88.3 FL (ref 80–100)
MONOCYTES # BLD: 0.7 K/UL (ref 0–1)
MONOCYTES NFR BLD: 7 %
NEUTROPHILS # BLD AUTO: 6.2 K/UL (ref 1.8–7.7)
NEUTROPHILS NFR BLD: 66 %
PLATELET # BLD AUTO: 323 K/UL (ref 140–400)
PMV BLD AUTO: 6.6 FL (ref 7.4–10.3)
RBC # BLD AUTO: 3.15 M/UL (ref 3.7–5.4)
WBC # BLD AUTO: 9.5 K/UL (ref 4–11)

## 2018-01-08 PROCEDURE — 85025 COMPLETE CBC W/AUTO DIFF WBC: CPT

## 2018-01-08 PROCEDURE — 36415 COLL VENOUS BLD VENIPUNCTURE: CPT

## 2018-01-09 ENCOUNTER — PRIOR ORIGINAL RECORDS (OUTPATIENT)
Dept: OTHER | Age: 58
End: 2018-01-09

## 2018-01-09 LAB
HEMATOCRIT: 27.8 %
HEMOGLOBIN: 9.8 G/DL
PLATELETS: 323 K/UL
RED BLOOD COUNT: 3.15 X 10-6/U
WHITE BLOOD COUNT: 9.5 X 10-3/U

## 2018-01-12 ENCOUNTER — OFFICE VISIT (OUTPATIENT)
Dept: INTERNAL MEDICINE CLINIC | Facility: CLINIC | Age: 58
End: 2018-01-12

## 2018-01-12 VITALS
DIASTOLIC BLOOD PRESSURE: 78 MMHG | SYSTOLIC BLOOD PRESSURE: 104 MMHG | TEMPERATURE: 98 F | HEART RATE: 96 BPM | WEIGHT: 127 LBS | OXYGEN SATURATION: 98 % | BODY MASS INDEX: 20.41 KG/M2 | HEIGHT: 66 IN

## 2018-01-12 DIAGNOSIS — I70.1 RENAL ARTERY STENOSIS (HCC): ICD-10-CM

## 2018-01-12 DIAGNOSIS — D62 POSTOPERATIVE ANEMIA DUE TO ACUTE BLOOD LOSS: ICD-10-CM

## 2018-01-12 DIAGNOSIS — Z98.890 HISTORY OF STENT INSERTION OF RENAL ARTERY: ICD-10-CM

## 2018-01-12 DIAGNOSIS — M79.605 PAIN IN BOTH LOWER EXTREMITIES: ICD-10-CM

## 2018-01-12 DIAGNOSIS — M79.604 PAIN IN BOTH LOWER EXTREMITIES: ICD-10-CM

## 2018-01-12 DIAGNOSIS — S37.012D HEMATOMA OF LEFT KIDNEY, SUBSEQUENT ENCOUNTER: ICD-10-CM

## 2018-01-12 DIAGNOSIS — I10 HYPERTENSION, ESSENTIAL: ICD-10-CM

## 2018-01-12 PROCEDURE — 99495 TRANSJ CARE MGMT MOD F2F 14D: CPT | Performed by: INTERNAL MEDICINE

## 2018-01-12 RX ORDER — ATORVASTATIN CALCIUM 20 MG/1
TABLET, FILM COATED ORAL
Refills: 10 | COMMUNITY
Start: 2018-01-03 | End: 2018-01-12

## 2018-01-12 NOTE — PROGRESS NOTES
Danica Gutierrez is a 59year old female who presents for     Post hospital--  Patient was hospitalized 12/29/17 to 12/30/17 at [facility]- bilateral renal stenosis --renal artery stents placed by Dr. Shun Dumont.  Developed post procedure subcapsular he Needle localization R breast biopsy Dr Renate Salmon at [facility]. path–atypical ductal hyperplasia, flat epithelial atypia, atypical lobular hyperplasia.   Rx [facility] per Dr. Renate Salmon   • Bilateral renal artery stenosis [facility]) 12/2017    Bilateral renal artery jeb Father    • cancer bladder [OTHER] Father 68   • Hypertension Mother    • Diabetes Mother    • Dementia Mother 80   • Hypertension Other    • Other [OTHER] Brother      L & W   • Other Marissa [place] Brother      L & W   • 5 daughters [OTHER] Daughter    • 2 sons kidney, subsequent encounter  post procedure subcapsular hematoma of left kidney after renal artery stents placed 12/29/17. Check CBC next wk. (last Hgb 9.8 on 1/8/18). See Dr. Brook Lewis.  Also see him for episode hematuria in Nov--may have been UTI but D UA--results overall ok--. (Unclear if BMP by Dr. Catalina Albert results are spurious- creatinine 2.39 BUN 53).  8/28/17–BMP per Dr. Giancarlo Damon, K3.5, creat 0.87, BUN 18 GFR >60.   9/15/86-GGO-nnbng 1.27 GFR 43.  Cut back lisinopril to 5 mg ramya discharge information  and continuity of care documents  ? Reviewed need for follow-up on treatments  ? specialists already aware of assumption/resumption of care  ?  Education given to patient on self-management, independent living, and activities of daily

## 2018-01-17 ENCOUNTER — LAB ENCOUNTER (OUTPATIENT)
Dept: LAB | Facility: HOSPITAL | Age: 58
End: 2018-01-17
Attending: INTERNAL MEDICINE
Payer: COMMERCIAL

## 2018-01-17 ENCOUNTER — PRIOR ORIGINAL RECORDS (OUTPATIENT)
Dept: OTHER | Age: 58
End: 2018-01-17

## 2018-01-17 DIAGNOSIS — D62 POSTOPERATIVE ANEMIA DUE TO ACUTE BLOOD LOSS: ICD-10-CM

## 2018-01-17 DIAGNOSIS — I10 HYPERTENSION, ESSENTIAL: ICD-10-CM

## 2018-01-17 DIAGNOSIS — N28.9 RENAL INSUFFICIENCY: ICD-10-CM

## 2018-01-17 LAB
ANION GAP SERPL CALC-SCNC: 11 MMOL/L (ref 0–18)
BASOPHILS # BLD: 0 K/UL (ref 0–0.2)
BASOPHILS NFR BLD: 1 %
BUN SERPL-MCNC: 11 MG/DL (ref 8–20)
BUN/CREAT SERPL: 12 (ref 10–20)
CALCIUM SERPL-MCNC: 9.4 MG/DL (ref 8.5–10.5)
CHLORIDE SERPL-SCNC: 101 MMOL/L (ref 95–110)
CO2 SERPL-SCNC: 27 MMOL/L (ref 22–32)
CREAT SERPL-MCNC: 0.92 MG/DL (ref 0.5–1.5)
EOSINOPHIL # BLD: 0.2 K/UL (ref 0–0.7)
EOSINOPHIL NFR BLD: 3 %
ERYTHROCYTE [DISTWIDTH] IN BLOOD BY AUTOMATED COUNT: 13.5 % (ref 11–15)
GLUCOSE SERPL-MCNC: 82 MG/DL (ref 70–99)
HCT VFR BLD AUTO: 31.8 % (ref 35–48)
HGB BLD-MCNC: 10.9 G/DL (ref 12–16)
LYMPHOCYTES # BLD: 1 K/UL (ref 1–4)
LYMPHOCYTES NFR BLD: 16 %
MCH RBC QN AUTO: 30.3 PG (ref 27–32)
MCHC RBC AUTO-ENTMCNC: 34.4 G/DL (ref 32–37)
MCV RBC AUTO: 88 FL (ref 80–100)
MONOCYTES # BLD: 0.8 K/UL (ref 0–1)
MONOCYTES NFR BLD: 12 %
NEUTROPHILS # BLD AUTO: 4.5 K/UL (ref 1.8–7.7)
NEUTROPHILS NFR BLD: 69 %
OSMOLALITY UR CALC.SUM OF ELEC: 286 MOSM/KG (ref 275–295)
PLATELET # BLD AUTO: 290 K/UL (ref 140–400)
PMV BLD AUTO: 6.9 FL (ref 7.4–10.3)
POTASSIUM SERPL-SCNC: 3.6 MMOL/L (ref 3.3–5.1)
RBC # BLD AUTO: 3.61 M/UL (ref 3.7–5.4)
SODIUM SERPL-SCNC: 139 MMOL/L (ref 136–144)
WBC # BLD AUTO: 6.5 K/UL (ref 4–11)

## 2018-01-17 PROCEDURE — 36415 COLL VENOUS BLD VENIPUNCTURE: CPT

## 2018-01-17 PROCEDURE — 80048 BASIC METABOLIC PNL TOTAL CA: CPT

## 2018-01-17 PROCEDURE — 85025 COMPLETE CBC W/AUTO DIFF WBC: CPT

## 2018-01-18 ENCOUNTER — TELEPHONE (OUTPATIENT)
Dept: INTERNAL MEDICINE CLINIC | Facility: CLINIC | Age: 58
End: 2018-01-18

## 2018-01-18 DIAGNOSIS — M79.604 PAIN IN BOTH LOWER EXTREMITIES: Primary | ICD-10-CM

## 2018-01-18 DIAGNOSIS — M79.605 PAIN IN BOTH LOWER EXTREMITIES: Primary | ICD-10-CM

## 2018-01-18 NOTE — TELEPHONE ENCOUNTER
As FYI to DR. GUTIERREZ called patient and relayed DR. GUTIERREZ message - verbalized understanding.  The leg pain has NOT improved since holding Atorvastatin

## 2018-01-18 NOTE — TELEPHONE ENCOUNTER
To nursing, please tell pt lab done on 1/17/18-cbc and bmp--results are ok. The anemia she had after the kidney hematoma has improved to hemoglobin 10.9--up from 9.8 on 1/8/18. Has the leg pain improved since she held the atorvastatin? Thanks.

## 2018-01-24 NOTE — TELEPHONE ENCOUNTER
[address] [place] [address] [address] with status of leg pain. (of note, lab didn't add a CK level).

## 2018-01-25 LAB
BUN: 11 MG/DL
CALCIUM: 9.4 MG/DL
CHLORIDE: 101 MEQ/L
CREATININE, SERUM: 0.92 MG/DL
GLUCOSE: 82 MG/DL
HEMATOCRIT: 31.8 %
HEMOGLOBIN: 10.9 G/DL
PLATELETS: 290 K/UL
POTASSIUM, SERUM: 3.6 MEQ/L
RED BLOOD COUNT: 3.61 X 10-6/U
SODIUM: 139 MEQ/L
WHITE BLOOD COUNT: 6.5 X 10-3/U

## 2018-01-26 ENCOUNTER — PRIOR ORIGINAL RECORDS (OUTPATIENT)
Dept: OTHER | Age: 58
End: 2018-01-26

## 2018-02-18 ENCOUNTER — OFFICE VISIT (OUTPATIENT)
Dept: FAMILY MEDICINE CLINIC | Facility: CLINIC | Age: 58
End: 2018-02-18

## 2018-02-18 VITALS
RESPIRATION RATE: 18 BRPM | TEMPERATURE: 98 F | HEART RATE: 72 BPM | DIASTOLIC BLOOD PRESSURE: 70 MMHG | SYSTOLIC BLOOD PRESSURE: 112 MMHG

## 2018-02-18 DIAGNOSIS — H10.32 ACUTE BACTERIAL CONJUNCTIVITIS OF LEFT EYE: Primary | ICD-10-CM

## 2018-02-18 PROCEDURE — 99213 OFFICE O/P EST LOW 20 MIN: CPT | Performed by: NURSE PRACTITIONER

## 2018-02-18 RX ORDER — TOBRAMYCIN 3 MG/ML
SOLUTION/ DROPS OPHTHALMIC
Qty: 1 BOTTLE | Refills: 0 | Status: SHIPPED | OUTPATIENT
Start: 2018-02-18 | End: 2018-04-13

## 2018-02-18 RX ORDER — TRIAMTERENE AND HYDROCHLOROTHIAZIDE 37.5; 25 MG/1; MG/1
CAPSULE ORAL
Refills: 0 | COMMUNITY
Start: 2018-01-29 | End: 2018-04-13

## 2018-02-18 NOTE — PATIENT INSTRUCTIONS
Conjunctivitis Caused by Infection     Wash hands often to help prevent spreading infection. Infections are caused by viruses or germs (bacteria). Treatment includes keeping your eyes and hands clean.  Your healthcare provider may prescribe eye drops, Your healthcare provider may prescribe eye drops or ointment to kill the bacteria. Use the medicine for the number of days it is prescribed. Don't stop using it when the symptoms improve. Warm compresses can help keep the eyelids clean.  To keep the bacteri

## 2018-02-18 NOTE — PROGRESS NOTES
CHIEF COMPLAINT:   Patient presents with:  Pink Eye: left eye was \"glued shut this morning\", now having green drainage, for 24 hours. HPI:   Schuyler Camarillo is a 59year old female who presents with chief complaint of \"pink eye\".  Symptoms began  1 Bilateral renal artery stents–12/29/17–Dr. Anay Narvaez. Left renal subcapsular hematoma post procedure. BETTE related to atherosclerosis.     • Dilated cardiomyopathy ([address] 08/2017    cardiac cath Dr Livan Carpio 8/10/17 -Coronary arteries normal. Mild global LV hypokine • 5 daughters Jami Flatten Daughter    • 2 sons Jami Castro Son      1 son with Aspergers      Smoking status: Current Every Day Smoker                                                   Packs/day: 0.75      Years: 40.00        Start date: 1/1/1978  Smokeless tobacc Medication and instructions as listed below  Warm compresses to affected eye prn. Advised patient to avoid touching eyes; importancestressed  of good handwashing. Risks, benefits, complications and side effects of meds discussed.   Can return to work/sc Treatment  Your healthcare provider may prescribe eye drops or ointment to kill the bacteria. Use the medicine for the number of days it is prescribed. Don't stop using it when the symptoms improve. Warm compresses can help keep the eyelids clean.  To keep

## 2018-02-23 NOTE — TELEPHONE ENCOUNTER
[phone]  I left message on voice mail to call if still having thigh pain. Otherwise will see at her scheduled office visit 3/9/18.

## 2018-04-13 ENCOUNTER — OFFICE VISIT (OUTPATIENT)
Dept: INTERNAL MEDICINE CLINIC | Facility: CLINIC | Age: 58
End: 2018-04-13

## 2018-04-13 VITALS
SYSTOLIC BLOOD PRESSURE: 110 MMHG | BODY MASS INDEX: 20.25 KG/M2 | HEART RATE: 68 BPM | WEIGHT: 126 LBS | DIASTOLIC BLOOD PRESSURE: 68 MMHG | HEIGHT: 66 IN | TEMPERATURE: 98 F

## 2018-04-13 DIAGNOSIS — F17.200 SMOKER: ICD-10-CM

## 2018-04-13 DIAGNOSIS — I10 ESSENTIAL HYPERTENSION: Primary | ICD-10-CM

## 2018-04-13 DIAGNOSIS — E78.00 HYPERCHOLESTEROLEMIA: ICD-10-CM

## 2018-04-13 DIAGNOSIS — Z98.890 HISTORY OF STENT INSERTION OF RENAL ARTERY: ICD-10-CM

## 2018-04-13 PROCEDURE — 99212 OFFICE O/P EST SF 10 MIN: CPT | Performed by: INTERNAL MEDICINE

## 2018-04-13 PROCEDURE — 99213 OFFICE O/P EST LOW 20 MIN: CPT | Performed by: INTERNAL MEDICINE

## 2018-04-13 RX ORDER — UBIDECARENONE 100 MG
CAPSULE ORAL
Qty: 30 CAPSULE | Refills: 0 | COMMUNITY
Start: 2018-04-13 | End: 2018-06-08

## 2018-04-13 RX ORDER — ROSUVASTATIN CALCIUM 5 MG/1
5 TABLET, COATED ORAL NIGHTLY
Qty: 30 TABLET | Refills: 11 | Status: SHIPPED | OUTPATIENT
Start: 2018-04-13 | End: 2019-05-13

## 2018-04-13 NOTE — PROGRESS NOTES
Gregor Farr is a 59year old female who presents for     Check at 3 mo. Feels good. Bilateral renal stenosis --renal artery stents placed 12/29/17 by Dr. Itzel Mathews. Home BPs are 110/80.    Taking metoprolol ER 25mg daily  Saw Dr Ilda Dunbar 1/26/18-he History:  11/28/2012: BREAST BIOPSY Right      Comment: R needle loc breast bx Dr John Kulkarni at [facility]  12/29/2017: CATH RENAL STENT Bilateral      Comment: Bilateral renal artery stents Dr. Rayne Rosa at                [facility].  0 S1S2 normal without murmur  Breasts: no mass or axillary adenopathy at 7/25/17 visit  Abdomen: soft, nontender without mass or hepatosplenomegaly at 1/12/18 visit.    Extremities: no edema  Neurologic: alert and oriented      ASSESSMENT AND PLAN:     Hypert 7/25/17. Flu shot 11/10/17. Gyn Dr Cecy wAad. Pap normal 5/12/17. Mammo-9/28/17-benign at [facility]-ordered by gyn.   Dexa-per gyn at office about 2014--osteopenia   Colonoscopy Dr Dillon Kruger 6/26/08-hyperplastic polyp and int hemorrhoids.  Next i capsule by mouth daily.   Disp:  Rfl:          Ahmet Wright MD  4/13/2018

## 2018-05-31 RX ORDER — DICYCLOMINE HYDROCHLORIDE 10 MG/1
CAPSULE ORAL
Qty: 100 CAPSULE | Refills: 3 | Status: SHIPPED | OUTPATIENT
Start: 2018-05-31 | End: 2018-06-08

## 2018-06-08 PROCEDURE — 87624 HPV HI-RISK TYP POOLED RSLT: CPT | Performed by: OBSTETRICS & GYNECOLOGY

## 2018-06-08 PROCEDURE — 88175 CYTOPATH C/V AUTO FLUID REDO: CPT | Performed by: OBSTETRICS & GYNECOLOGY

## 2018-08-06 ENCOUNTER — PRIOR ORIGINAL RECORDS (OUTPATIENT)
Dept: OTHER | Age: 58
End: 2018-08-06

## 2018-08-31 ENCOUNTER — MYAURORA ACCOUNT LINK (OUTPATIENT)
Dept: OTHER | Age: 58
End: 2018-08-31

## 2018-08-31 ENCOUNTER — PRIOR ORIGINAL RECORDS (OUTPATIENT)
Dept: OTHER | Age: 58
End: 2018-08-31

## 2018-09-09 RX ORDER — BUPROPION HYDROCHLORIDE 150 MG/1
TABLET ORAL
Qty: 30 TABLET | Refills: 5 | Status: SHIPPED | OUTPATIENT
Start: 2018-09-09 | End: 2019-05-13

## 2018-09-10 ENCOUNTER — PRIOR ORIGINAL RECORDS (OUTPATIENT)
Dept: OTHER | Age: 58
End: 2018-09-10

## 2018-09-10 ENCOUNTER — HOSPITAL ENCOUNTER (OUTPATIENT)
Dept: CT IMAGING | Facility: HOSPITAL | Age: 58
Discharge: HOME | End: 2018-09-10
Attending: INTERNAL MEDICINE
Payer: COMMERCIAL

## 2018-09-10 DIAGNOSIS — I77.810 DILATATION OF THORACIC AORTA (HCC): ICD-10-CM

## 2018-09-10 LAB — CREAT BLD-MCNC: 0.9 MG/DL (ref 0.5–1.5)

## 2018-09-10 PROCEDURE — 82565 ASSAY OF CREATININE: CPT

## 2018-09-10 PROCEDURE — 71260 CT THORAX DX C+: CPT | Performed by: INTERNAL MEDICINE

## 2018-09-11 LAB — CREATININE, SERUM: 0.9 MG/DL

## 2018-09-12 ENCOUNTER — PRIOR ORIGINAL RECORDS (OUTPATIENT)
Dept: OTHER | Age: 58
End: 2018-09-12

## 2018-12-26 ENCOUNTER — OFFICE VISIT (OUTPATIENT)
Dept: INTERNAL MEDICINE CLINIC | Facility: CLINIC | Age: 58
End: 2018-12-26
Payer: COMMERCIAL

## 2018-12-26 VITALS
SYSTOLIC BLOOD PRESSURE: 138 MMHG | WEIGHT: 135 LBS | OXYGEN SATURATION: 98 % | HEART RATE: 83 BPM | HEIGHT: 65 IN | DIASTOLIC BLOOD PRESSURE: 78 MMHG | TEMPERATURE: 98 F | BODY MASS INDEX: 22.49 KG/M2

## 2018-12-26 DIAGNOSIS — E78.00 HYPERCHOLESTEROLEMIA: ICD-10-CM

## 2018-12-26 DIAGNOSIS — I10 ESSENTIAL HYPERTENSION: Primary | ICD-10-CM

## 2018-12-26 DIAGNOSIS — Z98.890 HISTORY OF STENT INSERTION OF RENAL ARTERY: ICD-10-CM

## 2018-12-26 PROCEDURE — 99212 OFFICE O/P EST SF 10 MIN: CPT | Performed by: INTERNAL MEDICINE

## 2018-12-26 PROCEDURE — 90471 IMMUNIZATION ADMIN: CPT | Performed by: INTERNAL MEDICINE

## 2018-12-26 PROCEDURE — 99214 OFFICE O/P EST MOD 30 MIN: CPT | Performed by: INTERNAL MEDICINE

## 2018-12-26 PROCEDURE — 90686 IIV4 VACC NO PRSV 0.5 ML IM: CPT | Performed by: INTERNAL MEDICINE

## 2018-12-26 PROCEDURE — 90472 IMMUNIZATION ADMIN EACH ADD: CPT | Performed by: INTERNAL MEDICINE

## 2018-12-26 PROCEDURE — 90732 PPSV23 VACC 2 YRS+ SUBQ/IM: CPT | Performed by: INTERNAL MEDICINE

## 2018-12-26 NOTE — PROGRESS NOTES
Juan Dobson is a 59year old female who presents for     Check at 8 mo.      Feels good.      Bilateral renal stenosis --renal artery qegmwd29/29/17 by Dr. Opal Salgado. Home BPs are 127/80. feels may be retaining fluid in hands and feet for 2 mo.    In p • IBS diag 2009   • Lipid screening 05/08/2008    Per [employer]   • Musculoskeletal disorder 2001    left foot bunion (surgery)   • Osteopenia ~2014    DEXA per GYN at office   • PVC's (premature ventricular contractions) 07/2017    Frequent PVCs on EKG 7/ pain or palpitations  Gastrointestinal: No abdominal pain, vomiting, diarrhea or constipation  Genitourinary:  No dysuria or blood in the urine.       EXAM:   /78 (BP Location: Right arm, Patient Position: Sitting, Cuff Size: adult)   Pulse 83   Temp 40–45%. last echo per Dr Naomie Crow 8/3/18 EF 50%, wall motion nl, mild AR, asc aorta mildly dilated. CT chest done 9/10/18-No evidence of thoracic aortic aneurysm or dissection.     Irritable bowel syndrome-for yrs. Bentyl 10 mg tid prn--not helpful.  avoi (NOT CREATININE); Future          Current Outpatient Medications:  BUPROPION HCL ER, XL, 150 MG Oral Tablet 24 Hr TAKE 1 TABLET(150 MG) BY MOUTH DAILY Disp: 30 tablet Rfl: 5   Acidophilus/Pectin Oral Cap Take 1 capsule by mouth daily.  Disp:  Rfl:    Rosuva

## 2019-02-28 VITALS
RESPIRATION RATE: 18 BRPM | HEART RATE: 81 BPM | DIASTOLIC BLOOD PRESSURE: 60 MMHG | SYSTOLIC BLOOD PRESSURE: 110 MMHG | OXYGEN SATURATION: 96 % | WEIGHT: 127 LBS | HEIGHT: 66 IN | BODY MASS INDEX: 20.41 KG/M2

## 2019-02-28 VITALS
HEART RATE: 81 BPM | RESPIRATION RATE: 18 BRPM | BODY MASS INDEX: 20.83 KG/M2 | SYSTOLIC BLOOD PRESSURE: 110 MMHG | OXYGEN SATURATION: 98 % | DIASTOLIC BLOOD PRESSURE: 60 MMHG | HEIGHT: 65 IN | WEIGHT: 125 LBS

## 2019-02-28 VITALS
HEART RATE: 94 BPM | OXYGEN SATURATION: 98 % | WEIGHT: 125 LBS | BODY MASS INDEX: 20.83 KG/M2 | SYSTOLIC BLOOD PRESSURE: 112 MMHG | DIASTOLIC BLOOD PRESSURE: 60 MMHG | HEIGHT: 65 IN

## 2019-02-28 VITALS
DIASTOLIC BLOOD PRESSURE: 72 MMHG | OXYGEN SATURATION: 96 % | SYSTOLIC BLOOD PRESSURE: 114 MMHG | HEART RATE: 77 BPM | WEIGHT: 126 LBS | HEIGHT: 65 IN | BODY MASS INDEX: 20.99 KG/M2

## 2019-02-28 VITALS
HEART RATE: 78 BPM | DIASTOLIC BLOOD PRESSURE: 60 MMHG | BODY MASS INDEX: 20.99 KG/M2 | WEIGHT: 126 LBS | HEIGHT: 65 IN | SYSTOLIC BLOOD PRESSURE: 100 MMHG | OXYGEN SATURATION: 96 %

## 2019-02-28 VITALS
HEIGHT: 65 IN | SYSTOLIC BLOOD PRESSURE: 110 MMHG | HEART RATE: 84 BPM | DIASTOLIC BLOOD PRESSURE: 60 MMHG | BODY MASS INDEX: 20.33 KG/M2 | WEIGHT: 122 LBS | OXYGEN SATURATION: 97 %

## 2019-02-28 VITALS
HEART RATE: 88 BPM | DIASTOLIC BLOOD PRESSURE: 70 MMHG | HEIGHT: 65 IN | WEIGHT: 131 LBS | BODY MASS INDEX: 21.83 KG/M2 | SYSTOLIC BLOOD PRESSURE: 98 MMHG

## 2019-02-28 VITALS
DIASTOLIC BLOOD PRESSURE: 70 MMHG | BODY MASS INDEX: 20.66 KG/M2 | SYSTOLIC BLOOD PRESSURE: 122 MMHG | WEIGHT: 124 LBS | RESPIRATION RATE: 18 BRPM | OXYGEN SATURATION: 99 % | HEIGHT: 65 IN | HEART RATE: 78 BPM

## 2019-02-28 VITALS
BODY MASS INDEX: 21.16 KG/M2 | WEIGHT: 127 LBS | SYSTOLIC BLOOD PRESSURE: 120 MMHG | HEART RATE: 88 BPM | DIASTOLIC BLOOD PRESSURE: 70 MMHG | HEIGHT: 65 IN | OXYGEN SATURATION: 99 %

## 2019-02-28 VITALS
HEIGHT: 65 IN | HEART RATE: 96 BPM | DIASTOLIC BLOOD PRESSURE: 68 MMHG | BODY MASS INDEX: 20.83 KG/M2 | SYSTOLIC BLOOD PRESSURE: 120 MMHG | OXYGEN SATURATION: 96 % | WEIGHT: 125 LBS

## 2019-04-10 RX ORDER — ROSUVASTATIN CALCIUM 5 MG/1
TABLET, COATED ORAL
COMMUNITY
Start: 2018-08-31

## 2019-04-10 RX ORDER — ASPIRIN 81 MG/1
TABLET ORAL
COMMUNITY
Start: 2018-11-19 | End: 2019-07-30 | Stop reason: SDUPTHER

## 2019-04-10 RX ORDER — METOPROLOL SUCCINATE 50 MG/1
TABLET, EXTENDED RELEASE ORAL
COMMUNITY
Start: 2018-01-08 | End: 2020-08-04 | Stop reason: SDUPTHER

## 2019-04-10 RX ORDER — BUPROPION HYDROCHLORIDE 150 MG/1
TABLET, EXTENDED RELEASE ORAL
COMMUNITY

## 2019-04-10 RX ORDER — METOPROLOL SUCCINATE 25 MG/1
TABLET, EXTENDED RELEASE ORAL
COMMUNITY
End: 2019-09-29 | Stop reason: SDUPTHER

## 2019-04-10 RX ORDER — ALUMINUM ZIRCONIUM OCTACHLOROHYDREX GLY 16 G/100G
GEL TOPICAL
COMMUNITY

## 2019-05-14 NOTE — TELEPHONE ENCOUNTER
To [employer] - please call pt to schedule 6 month f/u exam - last seen 12/26/18. Remind pt to complete fasting labs that have been ordered as soon as possible - refill based on lab results. After scheduling pt, please send back to RX. Thank you!

## 2019-05-15 RX ORDER — DICYCLOMINE HYDROCHLORIDE 10 MG/1
CAPSULE ORAL
Qty: 100 CAPSULE | Refills: 0 | Status: SHIPPED | OUTPATIENT
Start: 2019-05-15 | End: 2019-06-18

## 2019-05-15 RX ORDER — BUPROPION HYDROCHLORIDE 150 MG/1
TABLET ORAL
Qty: 30 TABLET | Refills: 0 | Status: SHIPPED | OUTPATIENT
Start: 2019-05-15 | End: 2019-06-15

## 2019-05-15 RX ORDER — ROSUVASTATIN CALCIUM 5 MG/1
TABLET, COATED ORAL
Qty: 30 TABLET | Refills: 0 | Status: SHIPPED | OUTPATIENT
Start: 2019-05-15 | End: 2019-05-24

## 2019-05-20 ENCOUNTER — LAB ENCOUNTER (OUTPATIENT)
Dept: LAB | Facility: HOSPITAL | Age: 59
End: 2019-05-20
Attending: INTERNAL MEDICINE
Payer: COMMERCIAL

## 2019-05-20 DIAGNOSIS — E78.00 HYPERCHOLESTEROLEMIA: ICD-10-CM

## 2019-05-20 PROCEDURE — 36415 COLL VENOUS BLD VENIPUNCTURE: CPT

## 2019-05-20 PROCEDURE — 85025 COMPLETE CBC W/AUTO DIFF WBC: CPT

## 2019-05-20 PROCEDURE — 81003 URINALYSIS AUTO W/O SCOPE: CPT | Performed by: INTERNAL MEDICINE

## 2019-05-20 PROCEDURE — 80061 LIPID PANEL: CPT

## 2019-05-20 PROCEDURE — 80053 COMPREHEN METABOLIC PANEL: CPT

## 2019-05-20 PROCEDURE — 84443 ASSAY THYROID STIM HORMONE: CPT

## 2019-05-20 PROCEDURE — 82550 ASSAY OF CK (CPK): CPT

## 2019-05-24 ENCOUNTER — OFFICE VISIT (OUTPATIENT)
Dept: INTERNAL MEDICINE CLINIC | Facility: CLINIC | Age: 59
End: 2019-05-24
Payer: COMMERCIAL

## 2019-05-24 ENCOUNTER — TELEPHONE (OUTPATIENT)
Dept: INTERNAL MEDICINE CLINIC | Facility: CLINIC | Age: 59
End: 2019-05-24

## 2019-05-24 VITALS
WEIGHT: 134 LBS | SYSTOLIC BLOOD PRESSURE: 126 MMHG | RESPIRATION RATE: 12 BRPM | BODY MASS INDEX: 22 KG/M2 | OXYGEN SATURATION: 98 % | HEART RATE: 82 BPM | TEMPERATURE: 98 F | DIASTOLIC BLOOD PRESSURE: 80 MMHG

## 2019-05-24 DIAGNOSIS — E78.00 HYPERCHOLESTEROLEMIA: ICD-10-CM

## 2019-05-24 DIAGNOSIS — Z98.890 HISTORY OF STENT INSERTION OF RENAL ARTERY: ICD-10-CM

## 2019-05-24 DIAGNOSIS — I10 ESSENTIAL HYPERTENSION: Primary | ICD-10-CM

## 2019-05-24 DIAGNOSIS — F17.200 SMOKER: ICD-10-CM

## 2019-05-24 PROCEDURE — 99214 OFFICE O/P EST MOD 30 MIN: CPT | Performed by: INTERNAL MEDICINE

## 2019-05-24 PROCEDURE — 99212 OFFICE O/P EST SF 10 MIN: CPT | Performed by: INTERNAL MEDICINE

## 2019-05-24 RX ORDER — ROSUVASTATIN CALCIUM 5 MG/1
TABLET, COATED ORAL
Qty: 90 TABLET | Refills: 3 | Status: SHIPPED | OUTPATIENT
Start: 2019-05-24 | End: 2020-09-08

## 2019-05-24 NOTE — TELEPHONE ENCOUNTER
To [facility] PharmD. Can you check into a question pt had regarding wellbutrin  mg daily. She sees the undissolved tablet in her stool. Not sure if we should change to a different form of wellbutrin--or what input you have. She is due for refill.

## 2019-05-24 NOTE — PROGRESS NOTES
Javier Estrada is a 62year old female who presents for     Check at 5 mo.      Feels good.      Renal artery uxcisj20/29/17 by Dr. Miguel Rose). Home BPs 127/80.     Chronic fatigue pt feels is from a busy life. stable.    Dr Maycol Gallagher 8/31/18 cut dose o Heparin–then Eliquis until 12/2017.    • Vitamin D deficiency 2014      Past Surgical History:   Procedure Laterality Date   • BREAST BIOPSY Right 11/28/2012    R needle loc breast bx Dr Shiloh Menendez at [facility]   • CATH RENAL STENT Bilateral 12/29/2017 kg)  04/13/18 : 126 lb (57.2 kg)  01/12/18 : 127 lb (57.6 kg)  12/30/17 : 132 lb 14.4 oz (60.3 kg)      General: appears well nourished and in no acute distress  Lungs: clear to auscultation  Heart: regular rhythm, S1S2 normal without murmur  Breasts: exam nl.     Healthcare maintenance:  Vaccines- tdap 7/25/17. Flu shot 12/26/18, lfwsfoush27/26/18 (emphysema on CT 9/2018). shingrix disc 5/24/19 visit. Gyn Dr Moi Guevara.    Mammo-10/15/18-benign at [facility] hosp-per gyn.   Dexa-per gyn at office about

## 2019-05-30 NOTE — TELEPHONE ENCOUNTER
Spoke to pharmacist at [facility]; Was inquiring if the formulation they have is in a waxy matrix/controlled release  which would explain tablet-like appearance in stool;   She will need to research it and will contact the patient  mychart sent

## 2019-06-17 RX ORDER — BUPROPION HYDROCHLORIDE 150 MG/1
TABLET ORAL
Qty: 30 TABLET | Refills: 11 | Status: SHIPPED | OUTPATIENT
Start: 2019-06-17 | End: 2020-08-03

## 2019-06-20 RX ORDER — DICYCLOMINE HYDROCHLORIDE 10 MG/1
CAPSULE ORAL
Qty: 100 CAPSULE | Refills: 3 | Status: SHIPPED | OUTPATIENT
Start: 2019-06-20 | End: 2020-05-07

## 2019-07-31 RX ORDER — ASPIRIN 81 MG/1
TABLET, COATED ORAL
Qty: 30 TABLET | Refills: 0 | Status: SHIPPED | OUTPATIENT
Start: 2019-07-31 | End: 2019-09-08 | Stop reason: SDUPTHER

## 2019-08-14 ENCOUNTER — ANCILLARY PROCEDURE (OUTPATIENT)
Dept: CARDIOLOGY | Age: 59
End: 2019-08-14
Attending: INTERNAL MEDICINE

## 2019-08-14 DIAGNOSIS — I42.0 DILATED CARDIOMYOPATHY (CMD): ICD-10-CM

## 2019-08-14 PROCEDURE — 93306 TTE W/DOPPLER COMPLETE: CPT | Performed by: INTERNAL MEDICINE

## 2019-08-19 PROBLEM — F17.200 SMOKER: Status: ACTIVE | Noted: 2019-08-19

## 2019-08-19 PROBLEM — E55.9 VITAMIN D DEFICIENCY: Status: ACTIVE | Noted: 2019-08-19

## 2019-08-19 PROBLEM — M85.80 OSTEOPENIA: Status: ACTIVE | Noted: 2019-08-19

## 2019-08-19 PROBLEM — E78.00 HYPERCHOLESTEROLEMIA: Status: ACTIVE | Noted: 2019-08-19

## 2019-08-19 PROBLEM — I10 HYPERTENSION: Status: ACTIVE | Noted: 2019-08-19

## 2019-08-19 RX ORDER — DICYCLOMINE HYDROCHLORIDE 10 MG/1
CAPSULE ORAL
COMMUNITY
Start: 2018-08-31

## 2019-08-19 RX ORDER — GARLIC EXTRACT 500 MG
1 CAPSULE ORAL
COMMUNITY

## 2019-08-19 RX ORDER — MULTIVIT-MIN/IRON/FOLIC ACID/K 18-600-40
1 CAPSULE ORAL
COMMUNITY
Start: 2018-08-31

## 2019-08-20 ENCOUNTER — OFFICE VISIT (OUTPATIENT)
Dept: CARDIOLOGY | Age: 59
End: 2019-08-20

## 2019-08-20 VITALS
HEIGHT: 66 IN | WEIGHT: 129 LBS | DIASTOLIC BLOOD PRESSURE: 60 MMHG | OXYGEN SATURATION: 98 % | SYSTOLIC BLOOD PRESSURE: 120 MMHG | HEART RATE: 103 BPM | BODY MASS INDEX: 20.73 KG/M2

## 2019-08-20 DIAGNOSIS — I42.0 NONISCHEMIC DILATED CARDIOMYOPATHY (CMD): Primary | ICD-10-CM

## 2019-08-20 DIAGNOSIS — I70.1 RENAL ARTERY STENOSIS (CMD): ICD-10-CM

## 2019-08-20 DIAGNOSIS — I35.1 NONRHEUMATIC AORTIC VALVE INSUFFICIENCY: ICD-10-CM

## 2019-08-20 PROCEDURE — 99214 OFFICE O/P EST MOD 30 MIN: CPT | Performed by: INTERNAL MEDICINE

## 2019-08-20 PROCEDURE — 3074F SYST BP LT 130 MM HG: CPT | Performed by: INTERNAL MEDICINE

## 2019-08-20 PROCEDURE — 3078F DIAST BP <80 MM HG: CPT | Performed by: INTERNAL MEDICINE

## 2019-09-09 RX ORDER — ASPIRIN 81 MG/1
TABLET, COATED ORAL
Qty: 30 TABLET | Refills: 11 | Status: SHIPPED | OUTPATIENT
Start: 2019-09-09 | End: 2020-12-09

## 2019-10-01 RX ORDER — METOPROLOL SUCCINATE 25 MG/1
TABLET, EXTENDED RELEASE ORAL
Qty: 50 TABLET | Refills: 1 | Status: SHIPPED | OUTPATIENT
Start: 2019-10-01 | End: 2020-08-04

## 2019-10-16 NOTE — TELEPHONE ENCOUNTER
I spoke to Dr. Lazaro Jimenez who called earlier this afternoon. He notes patient is in the hospital with a right radial artery thrombosis at the site of the cardiac cath done last week.   He notes the patient was heparinized overnight and the discomfort is gone Double O-Z Flap Text: The defect edges were debeveled with a #15 scalpel blade.  Given the location of the defect, shape of the defect and the proximity to free margins a Double O-Z flap was deemed most appropriate.  Using a sterile surgical marker, an appropriate transposition flap was drawn incorporating the defect and placing the expected incisions within the relaxed skin tension lines where possible. The area thus outlined was incised deep to adipose tissue with a #15 scalpel blade.  The skin margins were undermined to an appropriate distance in all directions utilizing iris scissors.

## 2019-10-25 ENCOUNTER — PATIENT MESSAGE (OUTPATIENT)
Dept: INTERNAL MEDICINE CLINIC | Facility: CLINIC | Age: 59
End: 2019-10-25

## 2019-10-25 ENCOUNTER — TELEPHONE (OUTPATIENT)
Dept: INTERNAL MEDICINE CLINIC | Facility: CLINIC | Age: 59
End: 2019-10-25

## 2019-10-25 ENCOUNTER — OFFICE VISIT (OUTPATIENT)
Dept: INTERNAL MEDICINE CLINIC | Facility: CLINIC | Age: 59
End: 2019-10-25
Payer: COMMERCIAL

## 2019-10-25 VITALS
WEIGHT: 133 LBS | TEMPERATURE: 98 F | BODY MASS INDEX: 21.38 KG/M2 | SYSTOLIC BLOOD PRESSURE: 126 MMHG | OXYGEN SATURATION: 98 % | HEIGHT: 66 IN | HEART RATE: 86 BPM | DIASTOLIC BLOOD PRESSURE: 74 MMHG

## 2019-10-25 DIAGNOSIS — I10 HYPERTENSION, ESSENTIAL: ICD-10-CM

## 2019-10-25 DIAGNOSIS — R20.8 NUMBNESS OF LEFT FOOT: ICD-10-CM

## 2019-10-25 DIAGNOSIS — M54.2 NECK PAIN: ICD-10-CM

## 2019-10-25 DIAGNOSIS — R20.0 NUMBNESS OF LEFT HAND: Primary | ICD-10-CM

## 2019-10-25 PROCEDURE — 90686 IIV4 VACC NO PRSV 0.5 ML IM: CPT | Performed by: INTERNAL MEDICINE

## 2019-10-25 PROCEDURE — 99214 OFFICE O/P EST MOD 30 MIN: CPT | Performed by: INTERNAL MEDICINE

## 2019-10-25 PROCEDURE — 90471 IMMUNIZATION ADMIN: CPT | Performed by: INTERNAL MEDICINE

## 2019-10-25 RX ORDER — CYCLOBENZAPRINE HCL 5 MG
5 TABLET ORAL NIGHTLY PRN
Qty: 30 TABLET | Refills: 1 | Status: SHIPPED | OUTPATIENT
Start: 2019-10-25 | End: 2021-01-05

## 2019-10-25 NOTE — TELEPHONE ENCOUNTER
She has swelling in her left ankle. She has tingling and numbness in her left side in her fingers and hand on left side and left foot. She says this comes and goes. She says it is not totally numb. She denied any other concerns.   She has had these symptoms

## 2019-10-25 NOTE — TELEPHONE ENCOUNTER
From: Candance Hymen  To: Lavelle Guerrier MD  Sent: 10/25/2019 1:20 PM CDT  Subject: Non-Urgent Medical Question    Dr. Jeter Means -    I have been experiencing swelling on my left ankle for the past 3 weeks along with tingling in my left hand and foot (yamel

## 2019-10-25 NOTE — PROGRESS NOTES
Marjan Cortez is a 62year old female who presents for     Last visit 5/24/19--acute visit today    Numbness L hand and L foot  Starting 3 wks ago, noted tingling and numbness in her left hand fingers to palm and left foot toes to forefoot.  At first fernando • Osteopenia ~2014    DEXA per GYN at office   • PVC's (premature ventricular contractions) 07/2017    Frequent PVCs on EKG 7/25/17   • Radial artery thrombosis, right ([address] 08/2017    Right radial artery thrombosis post cardiac cath 8/2017.   Heparin–the Wt 133 lb (60.3 kg)   LMP 12/31/2008 (Exact Date)   SpO2 98%   BMI 21.47 kg/m²       Wt Readings from Last 6 Encounters:  10/25/19 : 133 lb (60.3 kg)  05/24/19 : 134 lb (60.8 kg)  12/26/18 : 135 lb (61.2 kg)  06/08/18 : 126 lb (57.2 kg)  04/13/18 : 126 lb bowel syndrome-for yrs. Bentyl 10 mg not helpful. Avoid lactose, try probiotic.    11/10/17--tissue transglutamidase IgA Ab and IgA level--nl.  Colonoscopy Dr Armando Montiel 6/16/18-int hemorrhoids.      Hx atypical ductal hyperplasia R breast bx 11/2012 Dr. Nilda Landa- Rfl: 0  Cholecalciferol (VITAMIN D) 2000 units Oral Cap, Take 1 capsule by mouth daily.  , Disp: , Rfl:           John Hernandez MD  10/25/2019

## 2019-10-25 NOTE — TELEPHONE ENCOUNTER
To nursing,   please tell patient she needs to be seen for this. Add her on today to my schedule or if she cannot come today then see me Monday 10/28/19. Can use MD approval slot if needed. If she is any worse over the weekend, go to ER. Thanks.

## 2019-10-25 NOTE — TELEPHONE ENCOUNTER
----- Message from Candance Hymen sent at 10/25/2019  1:20 PM CDT -----  Regarding: Non-Urgent Medical Question  Contact: [phone]  Dr. Jeter Means -    I have been experiencing swelling on my left ankle for the past 3 weeks along with tingling in my lef

## 2019-11-04 ENCOUNTER — HOSPITAL ENCOUNTER (OUTPATIENT)
Dept: MRI IMAGING | Age: 59
Discharge: HOME | End: 2019-11-04
Attending: INTERNAL MEDICINE
Payer: COMMERCIAL

## 2019-11-04 DIAGNOSIS — R20.8 NUMBNESS OF LEFT FOOT: ICD-10-CM

## 2019-11-04 DIAGNOSIS — R20.0 NUMBNESS OF LEFT HAND: ICD-10-CM

## 2019-11-04 DIAGNOSIS — M54.2 NECK PAIN: ICD-10-CM

## 2019-11-04 PROCEDURE — 72141 MRI NECK SPINE W/O DYE: CPT | Performed by: INTERNAL MEDICINE

## 2019-11-04 PROCEDURE — 70553 MRI BRAIN STEM W/O & W/DYE: CPT | Performed by: INTERNAL MEDICINE

## 2019-11-04 PROCEDURE — A9575 INJ GADOTERATE MEGLUMI 0.1ML: HCPCS | Performed by: INTERNAL MEDICINE

## 2019-11-04 PROCEDURE — 82565 ASSAY OF CREATININE: CPT

## 2019-11-06 ENCOUNTER — TELEPHONE (OUTPATIENT)
Dept: INTERNAL MEDICINE CLINIC | Facility: CLINIC | Age: 59
End: 2019-11-06

## 2019-11-06 DIAGNOSIS — D35.2 PITUITARY MICROADENOMA (HCC): Primary | ICD-10-CM

## 2019-11-06 RX ORDER — METHYLPREDNISOLONE 4 MG/1
TABLET ORAL
Qty: 1 KIT | Refills: 0 | Status: SHIPPED | OUTPATIENT
Start: 2019-11-06 | End: 2019-11-22

## 2019-11-22 ENCOUNTER — APPOINTMENT (OUTPATIENT)
Dept: LAB | Age: 59
End: 2019-11-22
Attending: INTERNAL MEDICINE
Payer: COMMERCIAL

## 2019-11-22 ENCOUNTER — OFFICE VISIT (OUTPATIENT)
Dept: INTERNAL MEDICINE CLINIC | Facility: CLINIC | Age: 59
End: 2019-11-22
Payer: COMMERCIAL

## 2019-11-22 VITALS
HEIGHT: 66 IN | DIASTOLIC BLOOD PRESSURE: 70 MMHG | HEART RATE: 60 BPM | SYSTOLIC BLOOD PRESSURE: 120 MMHG | WEIGHT: 134 LBS | BODY MASS INDEX: 21.53 KG/M2 | TEMPERATURE: 98 F

## 2019-11-22 DIAGNOSIS — M54.2 NECK PAIN: ICD-10-CM

## 2019-11-22 DIAGNOSIS — Z00.00 ANNUAL PHYSICAL EXAM: Primary | ICD-10-CM

## 2019-11-22 DIAGNOSIS — M47.812 CERVICAL ARTHRITIS: ICD-10-CM

## 2019-11-22 DIAGNOSIS — R20.0 NUMBNESS OF LEFT HAND: ICD-10-CM

## 2019-11-22 DIAGNOSIS — I10 HYPERTENSION, ESSENTIAL: ICD-10-CM

## 2019-11-22 DIAGNOSIS — E78.00 HYPERCHOLESTEROLEMIA: ICD-10-CM

## 2019-11-22 DIAGNOSIS — R20.8 NUMBNESS OF LEFT FOOT: ICD-10-CM

## 2019-11-22 DIAGNOSIS — Z98.890 HISTORY OF STENT INSERTION OF RENAL ARTERY: ICD-10-CM

## 2019-11-22 DIAGNOSIS — F17.200 SMOKER: ICD-10-CM

## 2019-11-22 DIAGNOSIS — D35.2 PITUITARY MICROADENOMA (HCC): ICD-10-CM

## 2019-11-22 LAB
ALBUMIN SERPL-MCNC: 3.6 G/DL
ALBUMIN/GLOB SERPL: 1.2 {RATIO}
ALP SERPL-CCNC: 50 U/L
ALT SERPL-CCNC: 29 UNITS/L
ANION GAP SERPL CALC-SCNC: 5 MMOL/L
AST SERPL-CCNC: 15 UNITS/L
BILIRUB SERPL-MCNC: 0.4 MG/DL
BUN SERPL-MCNC: 10 MG/DL
BUN/CREAT SERPL: 9.7
CALCIUM SERPL-MCNC: 9.8 MG/DL
CHLORIDE SERPL-SCNC: 113 MMOL/L
CHOLEST SERPL-MCNC: 173 MG/DL
CO2 SERPL-SCNC: 27 MMOL/L
CREAT SERPL-MCNC: 1.03 MG/DL
GLOBULIN SER-MCNC: 2.9 G/DL
GLUCOSE SERPL-MCNC: 87 MG/DL
HDLC SERPL-MCNC: 70 MG/DL
LDLC SERPL CALC-MCNC: 81 MG/DL
NONHDLC SERPL-MCNC: 103 MG/DL
POTASSIUM SERPL-SCNC: 3.8 MMOL/L
PROT SERPL-MCNC: 6.5 G/DL
SODIUM SERPL-SCNC: 145 MMOL/L
TRIGL SERPL-MCNC: 108 MG/DL
VLDLC SERPL CALC-MCNC: 22 MG/DL

## 2019-11-22 PROCEDURE — 80061 LIPID PANEL: CPT

## 2019-11-22 PROCEDURE — 80053 COMPREHEN METABOLIC PANEL: CPT

## 2019-11-22 PROCEDURE — 36415 COLL VENOUS BLD VENIPUNCTURE: CPT

## 2019-11-22 PROCEDURE — 99396 PREV VISIT EST AGE 40-64: CPT | Performed by: INTERNAL MEDICINE

## 2019-11-22 PROCEDURE — 84146 ASSAY OF PROLACTIN: CPT

## 2019-11-22 NOTE — PROGRESS NOTES
Selena Blanton is a 62year old female who presents for     Annual physical  Last visit 10/25/19    Numbness L hand and L foot since early October is the same. Has an appt to see neurologist Dr Nick Betancur 12/12/19. Copies of MRI reports given to pt to take. Lipid screening 05/08/2008    Per [employer]   • Musculoskeletal disorder 2001    left foot bunion (surgery)   • Numbness and tingling of left arm and leg 2019    MRI brain–11/4/19–3 x 2 mm pituitary nodule c/w incidental microadenoma.  no CVA or tumor   • Elias Ibrahim Comment: occasional    Drug use: No         Allergies:  No Known Allergies    Review of systems:  Constitutional:  No fever, loss of appetite or unintentional weight loss  Respiratory: No cough, wheezing or dyspnea  Cardiac: No chest pain or palpitations Iain.     Hypertension-on toprol XL 12.5 daily.     Renal artery stenosis--s/p bilat renal artery stents 12/29/17-Doing well.      Hypercholesterolemia-crestor 5 mg daily, co enzyme Q 100 mg daily.  LDL 61 on 5/20/19.   (atorvastatin caused leg pain in 1/2 expresses understanding of above issues and plan. - PHYSICAL THERAPY EXTERNAL        Current Outpatient Medications   Medication Sig Dispense Refill   • metoprolol succinate 12.5 mg Oral Tab Take 12.5 mg by mouth Daily Beta Blocker.      • Cyclobenzapri

## 2019-11-23 ENCOUNTER — TELEPHONE (OUTPATIENT)
Dept: INTERNAL MEDICINE CLINIC | Facility: CLINIC | Age: 59
End: 2019-11-23

## 2019-11-23 NOTE — TELEPHONE ENCOUNTER
I sent patient a results note email–  S[place] Vogt, your lab tests done 11/22/19 show good results for chemistry panel, cholesterol levels (LDL is good at 81) and a normal prolactin level as a check of pituitary function.    Go ahead and see Dr. Sadaf Ring as you plan

## 2019-12-06 ENCOUNTER — HOSPITAL ENCOUNTER (OUTPATIENT)
Dept: ULTRASOUND IMAGING | Facility: HOSPITAL | Age: 59
Discharge: HOME | End: 2019-12-06
Attending: INTERNAL MEDICINE
Payer: COMMERCIAL

## 2019-12-06 DIAGNOSIS — I70.1 RENAL ARTERY STENOSIS (HCC): ICD-10-CM

## 2019-12-06 PROCEDURE — 76775 US EXAM ABDO BACK WALL LIM: CPT | Performed by: INTERNAL MEDICINE

## 2019-12-06 PROCEDURE — 93975 VASCULAR STUDY: CPT | Performed by: INTERNAL MEDICINE

## 2019-12-23 ENCOUNTER — TELEPHONE (OUTPATIENT)
Dept: CARDIOLOGY | Age: 59
End: 2019-12-23

## 2020-02-12 ENCOUNTER — TELEPHONE (OUTPATIENT)
Dept: INTERNAL MEDICINE CLINIC | Facility: CLINIC | Age: 60
End: 2020-02-12

## 2020-02-12 NOTE — TELEPHONE ENCOUNTER
Pt sent [employer] message with the following note regarding cancelling her appt for 2/14/20:  \"My mom passed away this week and her wake is on 2/14. I will call to reschedule in a few weeks. \"  Tasked to Dr Tabatha Martel as Onesimo Jean-Baptiste

## 2020-05-07 RX ORDER — DICYCLOMINE HYDROCHLORIDE 10 MG/1
CAPSULE ORAL
Qty: 100 CAPSULE | Refills: 3 | Status: SHIPPED | OUTPATIENT
Start: 2020-05-07 | End: 2020-10-13

## 2020-07-20 ENCOUNTER — OFFICE VISIT (OUTPATIENT)
Dept: INTERNAL MEDICINE CLINIC | Facility: CLINIC | Age: 60
End: 2020-07-20
Payer: COMMERCIAL

## 2020-07-20 ENCOUNTER — HOSPITAL ENCOUNTER (OUTPATIENT)
Dept: GENERAL RADIOLOGY | Facility: HOSPITAL | Age: 60
Discharge: HOME | End: 2020-07-20
Attending: INTERNAL MEDICINE
Payer: COMMERCIAL

## 2020-07-20 VITALS
DIASTOLIC BLOOD PRESSURE: 82 MMHG | RESPIRATION RATE: 16 BRPM | WEIGHT: 137 LBS | OXYGEN SATURATION: 99 % | SYSTOLIC BLOOD PRESSURE: 120 MMHG | HEART RATE: 76 BPM | BODY MASS INDEX: 22 KG/M2 | TEMPERATURE: 99 F

## 2020-07-20 DIAGNOSIS — M47.812 CERVICAL ARTHRITIS: Primary | ICD-10-CM

## 2020-07-20 DIAGNOSIS — M54.2 NECK PAIN ON LEFT SIDE: ICD-10-CM

## 2020-07-20 DIAGNOSIS — M25.521 RIGHT ELBOW PAIN: ICD-10-CM

## 2020-07-20 PROCEDURE — 73080 X-RAY EXAM OF ELBOW: CPT | Performed by: INTERNAL MEDICINE

## 2020-07-20 PROCEDURE — 3074F SYST BP LT 130 MM HG: CPT | Performed by: INTERNAL MEDICINE

## 2020-07-20 PROCEDURE — 3079F DIAST BP 80-89 MM HG: CPT | Performed by: INTERNAL MEDICINE

## 2020-07-20 PROCEDURE — 99213 OFFICE O/P EST LOW 20 MIN: CPT | Performed by: INTERNAL MEDICINE

## 2020-07-20 NOTE — PROGRESS NOTES
Christopher Castelan is a 56year old female who presents for     Last visit 11/22/19    Neck pain persists. Back of neck on L side. \"my neck has never gotten better. \"  No radiation down arms. No weakness or numbness of arms. Flexeril 5 mg tid prn helps. left arm and leg 2019    MRI brain–11/4/19–3 x 2 mm pituitary nodule c/w incidental microadenoma.  no CVA or tumor   • Osteopenia ~2014    DEXA per GYN at office   • Pituitary microadenoma ([facility] 11/04/2019    MRI brain 11/4/19--3 x 2 mm incidental pituitary Allergies          EXAM:   /82   Pulse 76   Temp 99.3 °F (37.4 °C) (Oral)   Resp 16   Wt 137 lb (62.1 kg)   LMP 12/31/2008 (Exact Date)   SpO2 99%   BMI 22.11 kg/m²       Wt Readings from Last 6 Encounters:  07/20/20 : 137 lb (62.1 kg)  11/22/19 : 13 12/6/19-patent right and left bilateral renal arterial stents.     Hypercholesterolemia-crestor 5 mg daily, co enzyme Q 100 mg daily. LDL 61 on 5/20/19.   (atorvastatin caused leg pain in 1/2018).       Smoker-1/4-1/2 ppd. Taking Wellbutrin  mg daily. plan.       - PHYSICAL THERAPY EXTERNAL   - XR ELBOW, COMPLETE (MIN 3 VIEWS), RIGHT (CPT=73080);  Future          Current Outpatient Medications   Medication Sig Dispense Refill   • DICYCLOMINE HCL 10 MG Oral Cap TAKE 1 CAPSULE BY MOUTH THREE TIMES DAILY AS

## 2020-07-21 ENCOUNTER — TELEPHONE (OUTPATIENT)
Dept: INTERNAL MEDICINE CLINIC | Facility: CLINIC | Age: 60
End: 2020-07-21

## 2020-07-21 NOTE — TELEPHONE ENCOUNTER
[phone]  I called pt w results  X-ray R elbow 7/20/20--2 mm ossific density medial joint space possible small loose body or exophytic osteophyte. I recom see ortho Dr Ivone Aviles at [facility] AT [address] [place] [address]. Pt expr understanding.

## 2020-07-24 NOTE — TELEPHONE ENCOUNTER
Pt asked that Dr Franck Bernal please call her  She had appt today with Dr Toni Franco, pt waited for an hour and a half and was told at that point it would still be a 45 minute wait so pt left.   Pt requesting another recommendation from Dr Franck Bernal, possibly a

## 2020-07-24 NOTE — TELEPHONE ENCOUNTER
To nursing, ask patient to call [address] also have offices in [facility]. She can call [phone] and see if any of their elbow specialists have office hours at office in [facility].   Their elbow specialists are--Dr. Morro Shannon

## 2020-07-24 NOTE — TELEPHONE ENCOUNTER
Spoke to patient and relayed MD message, patient verbalized understanding. Patient is very thankful to Dr. Franck Bernal for taking the time to give her additional recommendations.

## 2020-08-03 RX ORDER — BUPROPION HYDROCHLORIDE 150 MG/1
TABLET ORAL
Qty: 30 TABLET | Refills: 2 | Status: SHIPPED | OUTPATIENT
Start: 2020-08-03 | End: 2020-11-16

## 2020-08-04 RX ORDER — METOPROLOL SUCCINATE 25 MG/1
TABLET, EXTENDED RELEASE ORAL
Qty: 50 TABLET | Refills: 0 | Status: SHIPPED | OUTPATIENT
Start: 2020-08-04 | End: 2021-04-15 | Stop reason: SDUPTHER

## 2020-08-25 ENCOUNTER — OFFICE VISIT (OUTPATIENT)
Dept: CARDIOLOGY | Age: 60
End: 2020-08-25

## 2020-08-25 VITALS
HEART RATE: 80 BPM | BODY MASS INDEX: 22.02 KG/M2 | OXYGEN SATURATION: 98 % | HEIGHT: 66 IN | WEIGHT: 137 LBS | DIASTOLIC BLOOD PRESSURE: 80 MMHG | SYSTOLIC BLOOD PRESSURE: 130 MMHG

## 2020-08-25 DIAGNOSIS — I35.1 NONRHEUMATIC AORTIC VALVE INSUFFICIENCY: ICD-10-CM

## 2020-08-25 DIAGNOSIS — I42.0 NONISCHEMIC DILATED CARDIOMYOPATHY (CMD): ICD-10-CM

## 2020-08-25 DIAGNOSIS — I70.1 RENAL ARTERY STENOSIS (CMD): Primary | ICD-10-CM

## 2020-08-25 PROCEDURE — 3075F SYST BP GE 130 - 139MM HG: CPT | Performed by: INTERNAL MEDICINE

## 2020-08-25 PROCEDURE — 99214 OFFICE O/P EST MOD 30 MIN: CPT | Performed by: INTERNAL MEDICINE

## 2020-08-25 PROCEDURE — 3079F DIAST BP 80-89 MM HG: CPT | Performed by: INTERNAL MEDICINE

## 2020-08-25 ASSESSMENT — PATIENT HEALTH QUESTIONNAIRE - PHQ9
CLINICAL INTERPRETATION OF PHQ9 SCORE: NO FURTHER SCREENING NEEDED
1. LITTLE INTEREST OR PLEASURE IN DOING THINGS: NOT AT ALL
SUM OF ALL RESPONSES TO PHQ9 QUESTIONS 1 AND 2: 0
SUM OF ALL RESPONSES TO PHQ9 QUESTIONS 1 AND 2: 0
CLINICAL INTERPRETATION OF PHQ2 SCORE: NO FURTHER SCREENING NEEDED
2. FEELING DOWN, DEPRESSED OR HOPELESS: NOT AT ALL

## 2020-09-08 RX ORDER — ROSUVASTATIN CALCIUM 5 MG/1
TABLET, COATED ORAL
Qty: 90 TABLET | Refills: 3 | Status: SHIPPED | OUTPATIENT
Start: 2020-09-08 | End: 2021-10-19

## 2020-10-06 ENCOUNTER — OFFICE VISIT (OUTPATIENT)
Dept: INTERNAL MEDICINE CLINIC | Facility: CLINIC | Age: 60
End: 2020-10-06
Payer: COMMERCIAL

## 2020-10-06 VITALS
BODY MASS INDEX: 22.18 KG/M2 | HEIGHT: 66 IN | TEMPERATURE: 98 F | HEART RATE: 60 BPM | DIASTOLIC BLOOD PRESSURE: 74 MMHG | WEIGHT: 138 LBS | SYSTOLIC BLOOD PRESSURE: 124 MMHG

## 2020-10-06 DIAGNOSIS — F17.200 SMOKER: ICD-10-CM

## 2020-10-06 DIAGNOSIS — M54.12 LEFT CERVICAL RADICULOPATHY: ICD-10-CM

## 2020-10-06 DIAGNOSIS — R05.9 COUGH: ICD-10-CM

## 2020-10-06 DIAGNOSIS — M54.2 NECK PAIN ON LEFT SIDE: Primary | ICD-10-CM

## 2020-10-06 DIAGNOSIS — I10 HYPERTENSION, ESSENTIAL: ICD-10-CM

## 2020-10-06 DIAGNOSIS — E78.00 HYPERCHOLESTEROLEMIA: ICD-10-CM

## 2020-10-06 PROCEDURE — 99214 OFFICE O/P EST MOD 30 MIN: CPT | Performed by: INTERNAL MEDICINE

## 2020-10-06 PROCEDURE — 3078F DIAST BP <80 MM HG: CPT | Performed by: INTERNAL MEDICINE

## 2020-10-06 PROCEDURE — 90686 IIV4 VACC NO PRSV 0.5 ML IM: CPT | Performed by: INTERNAL MEDICINE

## 2020-10-06 PROCEDURE — 90471 IMMUNIZATION ADMIN: CPT | Performed by: INTERNAL MEDICINE

## 2020-10-06 PROCEDURE — 3074F SYST BP LT 130 MM HG: CPT | Performed by: INTERNAL MEDICINE

## 2020-10-06 PROCEDURE — 3008F BODY MASS INDEX DOCD: CPT | Performed by: INTERNAL MEDICINE

## 2020-10-06 RX ORDER — VARENICLINE TARTRATE 0.5 (11)-1
KIT ORAL
Qty: 1 PACKAGE | Refills: 0 | Status: SHIPPED | OUTPATIENT
Start: 2020-10-06 | End: 2021-01-05

## 2020-10-06 NOTE — PROGRESS NOTES
Candance Hymen is a 56year old female who presents for     Last visit 7/20/20    Check at 2-1/2 months    Neck pain persists. Back of neck on L side is still there--wakes pt up at night. \"for a couple years now. \"  Numbness L arm -pins and needles.  No w Essential hypertension    • H/O colonoscopy 06/26/2008    Colonoscopy Dr. Steve Wagner 6/26/08–hyperplastic polyp and internal hemorrhoids.    • Hematoma of left kidney 12/2017    left kidney subcapsular hematoma--complication of renal artery stenting procedure daughters) Daughter    • Other (2 sons) Son         1 son with Aspergers   • Other (ASD) Daughter 39   • Other (cancer thyroid) Daughter 36      Social History:   Social History    Tobacco Use      Smoking status: Current Every Day Smoker        Packs/day: XR CHEST PA + LAT CHEST (CPT=71046); Future    5. Hypertension-on toprol XL 12.5 daily. 6. Hypercholesterolemia--crestor 5 mg daily, co enzyme Q 100 mg daily. LDL 61 on 5/20/19. (atorvastatin caused leg pain in 1/2018).   Check lab.     - CBC WITH DIFF maintenance:  Vaccines- tdap 7/25/17. Flu shot 10/6/20. Pneumovax 12/26/18 (emphysema on CT 9/2018). shingrix disc 5/24/19 and 11/22/19 visits. Gyn Dr Mai Tidwell. Mammo-10/15/18-benign at [facility] hosp-per gyn. Has ord per gyn ent 11/2019. Yash Roughstephany    Dexa daily. 30 tablet 0   • Cholecalciferol (VITAMIN D) 2000 units Oral Cap Take 1 capsule by mouth daily.             Edison Lambert MD  10/6/20

## 2020-10-13 RX ORDER — DICYCLOMINE HYDROCHLORIDE 10 MG/1
10 CAPSULE ORAL 3 TIMES DAILY PRN
Qty: 270 CAPSULE | Refills: 3 | Status: SHIPPED | OUTPATIENT
Start: 2020-10-13 | End: 2021-11-15

## 2020-10-19 ENCOUNTER — TELEPHONE (OUTPATIENT)
Dept: NEUROLOGY | Facility: CLINIC | Age: 60
End: 2020-10-19

## 2020-10-19 ENCOUNTER — OFFICE VISIT (OUTPATIENT)
Dept: NEUROLOGY | Facility: CLINIC | Age: 60
End: 2020-10-19
Payer: COMMERCIAL

## 2020-10-19 DIAGNOSIS — M77.11 RIGHT LATERAL EPICONDYLITIS: ICD-10-CM

## 2020-10-19 DIAGNOSIS — M19.021 PRIMARY OSTEOARTHRITIS OF RIGHT ELBOW: ICD-10-CM

## 2020-10-19 DIAGNOSIS — M54.12 CERVICAL RADICULOPATHY: ICD-10-CM

## 2020-10-19 DIAGNOSIS — M54.2 NECK PAIN ON LEFT SIDE: Primary | ICD-10-CM

## 2020-10-19 DIAGNOSIS — M25.521 RIGHT ELBOW PAIN: ICD-10-CM

## 2020-10-19 DIAGNOSIS — M50.90 CERVICAL DISC DISEASE: ICD-10-CM

## 2020-10-19 DIAGNOSIS — M50.20 CERVICAL HERNIATED DISC: ICD-10-CM

## 2020-10-19 PROCEDURE — 99244 OFF/OP CNSLTJ NEW/EST MOD 40: CPT | Performed by: PHYSICAL MEDICINE & REHABILITATION

## 2020-10-19 NOTE — PATIENT INSTRUCTIONS
Plan   She will get a new MRI of the cervical spine. She will call after having the MRI since she will probably need to have a cervical YUNIOR based on the results. .    She would like to pursue having a right common extensor tendon origin whole blood inj

## 2020-10-19 NOTE — PROGRESS NOTES
Cervical Pain [facility]    Chief Complaint:  Patient presents with:  Neck Pain: new right handed patient c/o (7/10) left sided neck pain radiating to shoulder w/ intermittent N+T in left arm x 1 year. Denies trauma. MRI Cervical Spine 11/04/2019.  Completed PT path–atypical ductal hyperplasia, flat epithelial atypia, atypical lobular hyperplasia. Rx [place] per Dr. Jerrell Le   • Bilateral renal artery stenosis ([address] 12/2017    Bilateral renal artery stents–12/29/17–Dr. Estefany Jones.  Left renal subcapsular hematoma post pro at [facility].   • COLONOSCOPY  06/28/2008    Colonoscopy Dr. Talia Ulloa polyp and internal hemorrhoids.    • CORRECT BUNION,OTHR METHODS     • [place]    missed ab   • FOOT SURGERY Left 2003     L foot bunion surgery x2   • [facility] Stress: Not on file    Relationships      Social connections        Talks on phone: Not on file        Gets together: Not on file        Attends [employer] service: Not on file        Active member of club or organization: Not on file        Attends meeting food allergies, seasonal allergies. Gait:   The patient has no difficulty walking. PE:  The patient does appear in her stated age in no distress. The patient is well groomed. Psychiatric:  The patient is alert and oriented x 3.   The patient has a Scapula: laterally displaced   Palpation: Non-tender shoulder palpations.    Right Internal Rotation: mildly limited   Left Internal Rotation: mildly limited   Right External Rotation: normal   Left External Rotation: normal   Shoulder Special Tests: Right

## 2020-10-19 NOTE — TELEPHONE ENCOUNTER
[employer] for authorization of approval for Right common extensor tendon origin of the forearm at the lateral epicondyle whole blood injection under ultrasound guidance  Cpt code 666 3558. it is not a covered benefit. Transaction ID: [id number].  Will

## 2020-10-20 NOTE — TELEPHONE ENCOUNTER
Twan Esquivel for authorization of approval for MRI C-spine wo cpt code 77181. Approval was given withAuthorization Number: [id number] effective 10/20/20 to 04/18/21. Will call Pt. To inform. Pt. informed of approval. She will call to schedule appt.

## 2020-10-27 ENCOUNTER — HOSPITAL ENCOUNTER (OUTPATIENT)
Dept: MRI IMAGING | Facility: HOSPITAL | Age: 60
Discharge: HOME | End: 2020-10-27
Attending: PHYSICAL MEDICINE & REHABILITATION
Payer: COMMERCIAL

## 2020-10-27 DIAGNOSIS — M50.90 CERVICAL DISC DISEASE: ICD-10-CM

## 2020-10-27 DIAGNOSIS — M54.2 NECK PAIN ON LEFT SIDE: ICD-10-CM

## 2020-10-27 DIAGNOSIS — M54.12 CERVICAL RADICULOPATHY: ICD-10-CM

## 2020-10-27 DIAGNOSIS — M50.20 CERVICAL HERNIATED DISC: ICD-10-CM

## 2020-10-27 PROCEDURE — 72141 MRI NECK SPINE W/O DYE: CPT | Performed by: PHYSICAL MEDICINE & REHABILITATION

## 2020-10-28 ENCOUNTER — TELEPHONE (OUTPATIENT)
Dept: NEUROLOGY | Facility: CLINIC | Age: 60
End: 2020-10-28

## 2020-10-28 DIAGNOSIS — M54.12 CERVICAL RADICULOPATHY: Primary | ICD-10-CM

## 2020-10-28 DIAGNOSIS — M50.90 CERVICAL DISC DISEASE: ICD-10-CM

## 2020-10-28 DIAGNOSIS — M48.02 CERVICAL STENOSIS OF SPINE: ICD-10-CM

## 2020-10-31 PROBLEM — M48.02 CERVICAL STENOSIS OF SPINE: Status: ACTIVE | Noted: 2020-10-31

## 2020-10-31 NOTE — TELEPHONE ENCOUNTER
There has been some mild worsening since her last MRI. If she is still having significant pain, then I will do a C7-T1 ILESI.   The order has been placed,

## 2020-11-02 ENCOUNTER — TELEPHONE (OUTPATIENT)
Dept: NEUROLOGY | Facility: CLINIC | Age: 60
End: 2020-11-02

## 2020-11-02 NOTE — TELEPHONE ENCOUNTER
[employer] for authorization of approval for Left C7-T1 ILESI cpt code V1934296. Approval was given with Authorization Number: [id number] for one unit/DOS effective 11/02/20 to 01/01/21. Will  inform Nursing.

## 2020-11-02 NOTE — TELEPHONE ENCOUNTER
Patient notified of the imaging results and plan. Patient verbalized understanding and would like to proceed with injection as her daughter is having open heart surgery next week and she need to be available to help with her grandchildren.     Patient has b

## 2020-11-10 ENCOUNTER — OFFICE VISIT (OUTPATIENT)
Dept: SURGERY | Facility: CLINIC | Age: 60
End: 2020-11-10

## 2020-11-10 DIAGNOSIS — M50.90 CERVICAL DISC DISEASE: ICD-10-CM

## 2020-11-10 DIAGNOSIS — M50.20 CERVICAL HERNIATED DISC: ICD-10-CM

## 2020-11-10 DIAGNOSIS — M54.12 CERVICAL RADICULOPATHY: Primary | ICD-10-CM

## 2020-11-10 PROCEDURE — 62321 NJX INTERLAMINAR CRV/THRC: CPT | Performed by: PHYSICAL MEDICINE & REHABILITATION

## 2020-11-10 NOTE — PROCEDURES
[address] [address]    CERVICAL INTERLAMINAR  NAME:  Eber Cantrell    MR #:    [id number] :  1960     PHYSICIAN:  René Timmons        Operative Report    DATE OF PROCEDURE: 11/10/2020   PREOPERATIVE DIAGNOSES: 1. left C7 radiculopat applied. The patient was transferred to the cart and into PAR. The patient was given discharge instructions and will follow up in the clinic as scheduled.   Throughout the whole procedure, the patient's pulse oximetry and vital signs were monitored and th

## 2020-11-16 ENCOUNTER — TELEPHONE (OUTPATIENT)
Dept: NEUROLOGY | Facility: CLINIC | Age: 60
End: 2020-11-16

## 2020-11-16 NOTE — TELEPHONE ENCOUNTER
Patient calling stating that she had the  Left C7-T1 inter laminar  6 days ago. 50% improvement in intermittent pain. Can be worse when she wakes up in the am.     But now  the right elbow which radiates into right forearm is constant burning pain.  Worsens

## 2020-11-18 RX ORDER — BUPROPION HYDROCHLORIDE 150 MG/1
150 TABLET ORAL DAILY
Qty: 90 TABLET | Refills: 3 | Status: SHIPPED | OUTPATIENT
Start: 2020-11-18 | End: 2021-12-23

## 2020-11-18 NOTE — TELEPHONE ENCOUNTER
I spoke with Derian Calderon. She has been taking bupropion. Last month she discussed trying Chantix. Her pharmacy did not fill the Chantix and she did not call the pharmacy to look into this. Her daughter had recent open heart surgery last week. Daughter is doing well. She is not sure if the bupropion is helping. She would like to continue this medication. She is taking it in the morning. Refill pended to Dr. Tyesha Rand for review. Patient asks for 90 day supply.

## 2020-11-18 NOTE — TELEPHONE ENCOUNTER
To nursing, please tell patient I sent refill to [facility] in [facility] for bupropion  mg daily #90 with 3 refills. Thanks.

## 2020-11-19 ENCOUNTER — HOSPITAL ENCOUNTER (OUTPATIENT)
Dept: GENERAL RADIOLOGY | Facility: HOSPITAL | Age: 60
Discharge: HOME | End: 2020-11-19
Attending: INTERNAL MEDICINE
Payer: COMMERCIAL

## 2020-11-19 ENCOUNTER — TELEPHONE (OUTPATIENT)
Dept: INTERNAL MEDICINE CLINIC | Facility: CLINIC | Age: 60
End: 2020-11-19

## 2020-11-19 DIAGNOSIS — R05.9 COUGH: ICD-10-CM

## 2020-11-19 DIAGNOSIS — F17.200 SMOKER: ICD-10-CM

## 2020-11-19 PROCEDURE — 71046 X-RAY EXAM CHEST 2 VIEWS: CPT | Performed by: INTERNAL MEDICINE

## 2020-11-20 NOTE — TELEPHONE ENCOUNTER
I sent patient results note email:  S[place] Vogt,  Your chest x-ray done 11/19/20 looks okay except possible COPD (chronic obstructive pulmonary disease) that can be seen as a complication of smoking.   I know you are working on stopping smoking and are taking W

## 2020-12-09 RX ORDER — ASPIRIN 81 MG/1
TABLET, COATED ORAL
Qty: 30 TABLET | Refills: 11 | Status: SHIPPED | OUTPATIENT
Start: 2020-12-09

## 2020-12-21 ENCOUNTER — LAB ENCOUNTER (OUTPATIENT)
Dept: LAB | Facility: HOSPITAL | Age: 60
End: 2020-12-21
Attending: INTERNAL MEDICINE
Payer: COMMERCIAL

## 2020-12-21 ENCOUNTER — TELEPHONE (OUTPATIENT)
Dept: INTERNAL MEDICINE CLINIC | Facility: CLINIC | Age: 60
End: 2020-12-21

## 2020-12-21 DIAGNOSIS — E78.00 HYPERCHOLESTEROLEMIA: ICD-10-CM

## 2020-12-21 PROCEDURE — 80061 LIPID PANEL: CPT

## 2020-12-21 PROCEDURE — 36415 COLL VENOUS BLD VENIPUNCTURE: CPT

## 2020-12-21 PROCEDURE — 85025 COMPLETE CBC W/AUTO DIFF WBC: CPT

## 2020-12-21 PROCEDURE — 84443 ASSAY THYROID STIM HORMONE: CPT

## 2020-12-21 PROCEDURE — 81003 URINALYSIS AUTO W/O SCOPE: CPT | Performed by: INTERNAL MEDICINE

## 2020-12-21 PROCEDURE — 80053 COMPREHEN METABOLIC PANEL: CPT

## 2020-12-22 NOTE — TELEPHONE ENCOUNTER
I sent patient results note email–  Sid[place] Vogt, your blood and urine test results done 12/21/20–results look good. Please let me know if questions. Have a wonderful Hermes Quirozon. Note to self–12/21/20–CBC CMP TSH lipid UA–okay. LDL 63.

## 2020-12-31 ENCOUNTER — TELEPHONE (OUTPATIENT)
Dept: INTERNAL MEDICINE CLINIC | Facility: CLINIC | Age: 60
End: 2020-12-31

## 2020-12-31 NOTE — TELEPHONE ENCOUNTER
Pt. Called stating she had rt elbow surgery 12-23-20 through [facility]. All went well with the surgery. Post op visit was ok. Pt. Now experiencing excruciating pain in her rt hand. She went back to her surgeon, he ordered her an U/S to R/O blood clot.   All

## 2020-12-31 NOTE — TELEPHONE ENCOUNTER
TATI to Dr. Vivi Silva----    Spoke to patient who reports she had R elbow arthroscopy with Dr. Jonny Kennedy on 12/23. States she has been experiencing 10/10 R hand and finger pain.  Reports she saw Ortho MD for FU and US was negative for DVT and pt advised he stat

## 2021-01-02 NOTE — TELEPHONE ENCOUNTER
Message noticed–patient was triaged to ER. Entry in [facility] 12/31/20–[address] in [place] 12/31/20. Was given IV Dilaudid and IV Zofran in the ER. Was released on prednisone.     Diagnosis in the ER–neuropathic pain r

## 2021-01-04 NOTE — TELEPHONE ENCOUNTER
FYI to Dr. Kathie Fernandez and FD---    Pt reports she is feeling minimally improved since ER visit. States she is taking Prednisone as prescribed and Gabapentin. States ER MD increased her Gabapentin 300mg to TID.  States she is feeling some relief as compared to last w

## 2021-01-04 NOTE — TELEPHONE ENCOUNTER
Called patient and scheduled her physical with Dr Jose Guadalupe Rico tomorrow, 1/5/2021, at 4:30pm.

## 2021-01-05 ENCOUNTER — OFFICE VISIT (OUTPATIENT)
Dept: INTERNAL MEDICINE CLINIC | Facility: CLINIC | Age: 61
End: 2021-01-05
Payer: COMMERCIAL

## 2021-01-05 VITALS
SYSTOLIC BLOOD PRESSURE: 128 MMHG | TEMPERATURE: 99 F | BODY MASS INDEX: 22.34 KG/M2 | DIASTOLIC BLOOD PRESSURE: 66 MMHG | HEART RATE: 60 BPM | HEIGHT: 66 IN | WEIGHT: 139 LBS

## 2021-01-05 DIAGNOSIS — M79.641 PAIN IN RIGHT HAND: ICD-10-CM

## 2021-01-05 DIAGNOSIS — I10 HYPERTENSION, ESSENTIAL: ICD-10-CM

## 2021-01-05 DIAGNOSIS — E78.00 HYPERCHOLESTEROLEMIA: ICD-10-CM

## 2021-01-05 DIAGNOSIS — Z98.890 HISTORY OF ARTHROSCOPIC SURGERY OF ELBOW: ICD-10-CM

## 2021-01-05 DIAGNOSIS — R20.0 NUMBNESS AND TINGLING OF LEFT HAND: ICD-10-CM

## 2021-01-05 DIAGNOSIS — M54.12 LEFT CERVICAL RADICULOPATHY: ICD-10-CM

## 2021-01-05 DIAGNOSIS — Z00.00 ANNUAL PHYSICAL EXAM: Primary | ICD-10-CM

## 2021-01-05 DIAGNOSIS — R20.2 NUMBNESS AND TINGLING OF LEFT HAND: ICD-10-CM

## 2021-01-05 DIAGNOSIS — F17.200 SMOKER: ICD-10-CM

## 2021-01-05 PROCEDURE — 99396 PREV VISIT EST AGE 40-64: CPT | Performed by: INTERNAL MEDICINE

## 2021-01-05 PROCEDURE — 3008F BODY MASS INDEX DOCD: CPT | Performed by: INTERNAL MEDICINE

## 2021-01-05 PROCEDURE — 3074F SYST BP LT 130 MM HG: CPT | Performed by: INTERNAL MEDICINE

## 2021-01-05 PROCEDURE — 3078F DIAST BP <80 MM HG: CPT | Performed by: INTERNAL MEDICINE

## 2021-01-05 RX ORDER — GABAPENTIN 300 MG/1
300 CAPSULE ORAL DAILY
Refills: 0 | COMMUNITY
Start: 2021-01-05

## 2021-01-05 NOTE — PROGRESS NOTES
Óscar Blanton is a 56year old female who presents for     Last visit 10/6/20    Annual physical    R elbow arthroscopy with Dr. Sanjuanita Platt on 12/23/20--repair of tendons per pt.  2 days later pain and swelling in R hand.  Pt notes she saw Dr Ed stoll -Coronary arteries normal. Mild global LV hypokinesis.  Estimated EF 40–45%   • DJD (degenerative joint disease) of cervical spine 11/04/2019    MRI cervical spine 11/4/19–degenerative disc disease, DJD, multiple perineural nerve root sleeve cysts   • Kp Father    • Heart Disease Father          age 80   • Other (cancer bladder) Father 68   • Hypertension Mother    • Diabetes Mother    • Dementia Mother 80   • Other ( age 80 after hip fracture) Mother    • Hypertension Other    • Other (Other) Carlos ventral wrist--aggravates tingling. L hand--no tend. Tenel's neg. Neurologic: alert and oriented    ASSESSMENT AND PLAN:     Annual physical    S/p arthroscopic surgery of R elbow 12/23/20 Dr Shelley Harmon at [facility].      Pain in right hand  Had post op swellin Jcarlos Barrera 6/16/18-int hemorrhoids.      Hx atypical ductal hyperplasia R breast bx 11/2012 Dr. Gilda Durand evista 60 mg daily-I recom hold the Evista in view of hx radial artery thrombosis.     Vitamin D deficiency-vitamin D 2000 units daily.  Vit D level 3

## 2021-01-08 RX ORDER — METHYLPREDNISOLONE 4 MG/1
TABLET ORAL
Qty: 21 EACH | Refills: 0 | OUTPATIENT
Start: 2021-01-08

## 2021-04-14 ENCOUNTER — E-ADVICE (OUTPATIENT)
Dept: CARDIOLOGY | Age: 61
End: 2021-04-14

## 2021-04-15 RX ORDER — METOPROLOL SUCCINATE 25 MG/1
12.5 TABLET, EXTENDED RELEASE ORAL DAILY
Qty: 45 TABLET | Refills: 1 | Status: SHIPPED | OUTPATIENT
Start: 2021-04-15

## 2021-07-13 ENCOUNTER — OFFICE VISIT (OUTPATIENT)
Dept: INTERNAL MEDICINE CLINIC | Facility: CLINIC | Age: 61
End: 2021-07-13
Payer: COMMERCIAL

## 2021-07-13 VITALS
HEIGHT: 66 IN | OXYGEN SATURATION: 99 % | SYSTOLIC BLOOD PRESSURE: 120 MMHG | BODY MASS INDEX: 22.02 KG/M2 | WEIGHT: 137 LBS | TEMPERATURE: 98 F | DIASTOLIC BLOOD PRESSURE: 76 MMHG | HEART RATE: 84 BPM

## 2021-07-13 DIAGNOSIS — I10 HYPERTENSION, ESSENTIAL: Primary | ICD-10-CM

## 2021-07-13 DIAGNOSIS — E78.00 HYPERCHOLESTEROLEMIA: ICD-10-CM

## 2021-07-13 DIAGNOSIS — F17.200 SMOKER: ICD-10-CM

## 2021-07-13 PROCEDURE — 3008F BODY MASS INDEX DOCD: CPT | Performed by: INTERNAL MEDICINE

## 2021-07-13 PROCEDURE — 99214 OFFICE O/P EST MOD 30 MIN: CPT | Performed by: INTERNAL MEDICINE

## 2021-07-13 PROCEDURE — 3078F DIAST BP <80 MM HG: CPT | Performed by: INTERNAL MEDICINE

## 2021-07-13 PROCEDURE — 3074F SYST BP LT 130 MM HG: CPT | Performed by: INTERNAL MEDICINE

## 2021-07-13 NOTE — PROGRESS NOTES
Flaquita Bryson is a 59year old female who presents for     Last visit 10/6/20    Annual physical    Had R CTS surgery 2/2021 Dr Calin Neves. Still soreness on ventral wrist. \"it's getting better. \"    Hx R elbow arthroscopy -Dr. Alee Thrasher on 12/23/20--re screening 05/08/2008    Per [employer]   • Musculoskeletal disorder 2001    left foot bunion (surgery)   • Numbness and tingling of left arm and leg 2019    MRI brain–11/4/19–3 x 2 mm pituitary nodule c/w incidental microadenoma.  no CVA or tumor   • Osteopeni Daughter 36      Social History:   Social History    Tobacco Use      Smoking status: Current Every Day Smoker        Packs/day: 0.75        Years: 40.00        Pack years: 27        Start date: 1/1/1978      Smokeless tobacco: Never Used    Vaping Use 10/27/20 per Dr Timmons--DDD DJD. Hx Numbness L hand L foot -resolved- Oct 2019--Unclear etiology   MRI brain–11/4/19–3 x 2 mm pituitary nodule c/w incidental microadenoma. No CVA or tumor.   Very small size of pituitary microadenoma likely to be nonfuncti URINALYSIS WITH CULTURE REFLEX      Current Outpatient Medications   Medication Sig Dispense Refill   • gabapentin 300 MG Oral Cap Take 1 capsule (300 mg total) by mouth daily.   0   • buPROPion HCl ER, XL, 150 MG Oral Tablet 24 Hr Take 1 tablet (150 mg tot

## 2021-07-13 NOTE — PATIENT INSTRUCTIONS
Lab in Dec 2021. Work on stopping smoking. Get covid vaccine. Dr Tomas Gracia for gynecology. Mammogram due in Sept --please use the order Dr Fátima Aden gave you.

## 2021-08-17 ENCOUNTER — APPOINTMENT (OUTPATIENT)
Dept: CARDIOLOGY | Age: 61
End: 2021-08-17
Attending: INTERNAL MEDICINE

## 2021-10-06 ENCOUNTER — E-VISIT (OUTPATIENT)
Dept: TELEHEALTH | Age: 61
End: 2021-10-06

## 2021-10-06 DIAGNOSIS — R50.9 FEVER, UNSPECIFIED FEVER CAUSE: ICD-10-CM

## 2021-10-06 DIAGNOSIS — R05.9 COUGH: Primary | ICD-10-CM

## 2021-10-06 NOTE — PROGRESS NOTES
Flaquita Bryson is a 59year old female. HPI:   See answers to questions above. Current Outpatient Medications   Medication Sig Dispense Refill   • gabapentin 300 MG Oral Cap Take 1 capsule (300 mg total) by mouth daily.   0   • buPROPion HCl ER, XL, 150 • H/O colonoscopy 06/26/2008    Colonoscopy Dr. Beth Randolph 6/26/08–hyperplastic polyp and internal hemorrhoids.    • Hematoma of left kidney 12/2017    left kidney subcapsular hematoma--complication of renal artery stenting procedure   • IBS diag 2009   • Tova Mcdonald Hypertension Other    • Other (Other) Brother         L & W   • Other (Other) Brother         L & W   • Other (5 daughters) Daughter    • Other (2 sons) Son         1 son with Aspergers   • Other (ASD) Daughter 39        robotic repair   • Other (cancer th

## 2021-10-19 RX ORDER — ROSUVASTATIN CALCIUM 5 MG/1
TABLET, COATED ORAL
Qty: 90 TABLET | Refills: 3 | Status: SHIPPED | OUTPATIENT
Start: 2021-10-19

## 2021-11-15 ENCOUNTER — LAB ENCOUNTER (OUTPATIENT)
Dept: LAB | Facility: HOSPITAL | Age: 61
End: 2021-11-15
Attending: INTERNAL MEDICINE
Payer: COMMERCIAL

## 2021-11-15 ENCOUNTER — TELEPHONE (OUTPATIENT)
Dept: INTERNAL MEDICINE CLINIC | Facility: CLINIC | Age: 61
End: 2021-11-15

## 2021-11-15 DIAGNOSIS — I70.1 ATHEROSCLEROSIS OF RENAL ARTERY (HCC): ICD-10-CM

## 2021-11-15 DIAGNOSIS — I10 HYPERTENSION, ESSENTIAL: ICD-10-CM

## 2021-11-15 DIAGNOSIS — I35.1 AORTIC INCOMPETENCE: Primary | ICD-10-CM

## 2021-11-15 PROCEDURE — 80061 LIPID PANEL: CPT

## 2021-11-15 PROCEDURE — 36415 COLL VENOUS BLD VENIPUNCTURE: CPT

## 2021-11-15 PROCEDURE — 81001 URINALYSIS AUTO W/SCOPE: CPT | Performed by: INTERNAL MEDICINE

## 2021-11-15 PROCEDURE — 85025 COMPLETE CBC W/AUTO DIFF WBC: CPT

## 2021-11-15 PROCEDURE — 87086 URINE CULTURE/COLONY COUNT: CPT | Performed by: INTERNAL MEDICINE

## 2021-11-15 PROCEDURE — 80053 COMPREHEN METABOLIC PANEL: CPT

## 2021-11-15 PROCEDURE — 84443 ASSAY THYROID STIM HORMONE: CPT

## 2021-11-15 RX ORDER — DICYCLOMINE HYDROCHLORIDE 10 MG/1
CAPSULE ORAL
Qty: 270 CAPSULE | Refills: 3 | Status: SHIPPED | OUTPATIENT
Start: 2021-11-15

## 2021-11-15 NOTE — TELEPHONE ENCOUNTER
See refill encounter 10/13/20  Refill request is for a maintenance medication and has met the criteria specified in the Ambulatory Medication Refill Standing Order for eligibility, visits, laboratory, alerts and was sent to the requested pharmacy.     Reque

## 2021-11-15 NOTE — TELEPHONE ENCOUNTER
Results back so far noted–11/15/21–CBC CMP TSH lipid UA–UA trace leukoesterase. Reflex Ucx pending. Await urine culture--->I note on 11/17/21 Ucx is neg. Isidro Bell Next visit is 1/18/22.

## 2021-12-23 RX ORDER — BUPROPION HYDROCHLORIDE 150 MG/1
TABLET ORAL
Qty: 90 TABLET | Refills: 3 | Status: SHIPPED | OUTPATIENT
Start: 2021-12-23

## 2022-01-13 LAB — AMB EXT COVID-19 RESULT: DETECTED

## 2022-01-14 ENCOUNTER — HOSPITAL ENCOUNTER (OUTPATIENT)
Age: 62
Discharge: HOME | End: 2022-01-14
Payer: COMMERCIAL

## 2022-01-14 ENCOUNTER — APPOINTMENT (OUTPATIENT)
Dept: GENERAL RADIOLOGY | Age: 62
End: 2022-01-14
Attending: NURSE PRACTITIONER
Payer: COMMERCIAL

## 2022-01-14 ENCOUNTER — TELEPHONE (OUTPATIENT)
Dept: INTERNAL MEDICINE CLINIC | Facility: CLINIC | Age: 62
End: 2022-01-14

## 2022-01-14 VITALS
BODY MASS INDEX: 21.21 KG/M2 | OXYGEN SATURATION: 98 % | SYSTOLIC BLOOD PRESSURE: 120 MMHG | TEMPERATURE: 99 F | DIASTOLIC BLOOD PRESSURE: 71 MMHG | HEART RATE: 87 BPM | WEIGHT: 132 LBS | HEIGHT: 66 IN | RESPIRATION RATE: 20 BRPM

## 2022-01-14 DIAGNOSIS — U07.1 COVID: Primary | ICD-10-CM

## 2022-01-14 PROCEDURE — 71046 X-RAY EXAM CHEST 2 VIEWS: CPT | Performed by: NURSE PRACTITIONER

## 2022-01-14 PROCEDURE — 99213 OFFICE O/P EST LOW 20 MIN: CPT | Performed by: NURSE PRACTITIONER

## 2022-01-14 NOTE — ED PROVIDER NOTES
Patient Seen in: [facility]      History   Patient presents with:  Chest Pain    Stated Complaint: Chest Pain/Covid     Subjective:   HPI    61yr old female who tested positive for covid yesterday Sent by PMD to [facility].  Pt reports having an DEXA per GYN at office   • Pituitary microadenoma ([facility] [address] 11/04/2019    MRI brain 11/4/19--3 x 2 mm incidental pituitary microadenoma   • PVC's (premature ventricular contractions) 07/2017    Frequent PVCs on EKG 7/25/17   • Radial artery thrombosis, right ( Physical Exam    Adult physical exam:     VS: Vital signs reviewed. O2 saturation within normal limits for this patient     General: Patient is awake and alert, oriented to person, place and time. Not in acute distress.       HEENT: Head is normocep rule out pneumonia. Patient denies any coughing, fever, shortness of breath. A chest x-ray was ordered and shows no acute findings.   Patient discharged home with information on COVID to follow-up with her primary care physician as needed and to return wi

## 2022-01-14 NOTE — TELEPHONE ENCOUNTER
TATI to Dr. Sivakumar Weiss-----    Spoke to pt who reports she has been + for rapid and pcr covid and symptoms started on 1/12. Reports she recently has developed rib cage pain, and middle back pain that has been persistent, body aches, HA, and cough.  Pt reports she has

## 2022-01-14 NOTE — ED INITIAL ASSESSMENT (HPI)
Patient is here with chest pain that she feels is related to covid. Her primary sent her hear for a chest xray.

## 2022-01-14 NOTE — TELEPHONE ENCOUNTER
Patient tested positive for covid on 1/13/22  Patient tooka home test & was tested at a center in [facility]  She is experiencing a lot of pain in the front lower part of her ribs extending all the way around the back  Patient is very concerned about this pa

## 2022-03-01 ENCOUNTER — OFFICE VISIT (OUTPATIENT)
Dept: INTERNAL MEDICINE CLINIC | Facility: CLINIC | Age: 62
End: 2022-03-01
Payer: COMMERCIAL

## 2022-03-01 VITALS
DIASTOLIC BLOOD PRESSURE: 74 MMHG | HEART RATE: 64 BPM | TEMPERATURE: 99 F | HEIGHT: 66 IN | BODY MASS INDEX: 22.34 KG/M2 | WEIGHT: 139 LBS | SYSTOLIC BLOOD PRESSURE: 130 MMHG

## 2022-03-01 DIAGNOSIS — J44.9 CHRONIC OBSTRUCTIVE PULMONARY DISEASE, UNSPECIFIED COPD TYPE (HCC): ICD-10-CM

## 2022-03-01 DIAGNOSIS — E78.00 HYPERCHOLESTEROLEMIA: ICD-10-CM

## 2022-03-01 DIAGNOSIS — Z78.0 POSTMENOPAUSAL: ICD-10-CM

## 2022-03-01 DIAGNOSIS — I10 HYPERTENSION, ESSENTIAL: ICD-10-CM

## 2022-03-01 DIAGNOSIS — Z00.00 ANNUAL PHYSICAL EXAM: Primary | ICD-10-CM

## 2022-03-01 DIAGNOSIS — Z98.890 HISTORY OF STENT INSERTION OF RENAL ARTERY: ICD-10-CM

## 2022-03-01 DIAGNOSIS — M54.12 LEFT CERVICAL RADICULOPATHY: ICD-10-CM

## 2022-03-01 DIAGNOSIS — F17.200 SMOKER: ICD-10-CM

## 2022-03-01 PROCEDURE — 3078F DIAST BP <80 MM HG: CPT | Performed by: INTERNAL MEDICINE

## 2022-03-01 PROCEDURE — 99396 PREV VISIT EST AGE 40-64: CPT | Performed by: INTERNAL MEDICINE

## 2022-03-01 PROCEDURE — 3008F BODY MASS INDEX DOCD: CPT | Performed by: INTERNAL MEDICINE

## 2022-03-01 PROCEDURE — 3075F SYST BP GE 130 - 139MM HG: CPT | Performed by: INTERNAL MEDICINE

## 2022-03-01 RX ORDER — LISINOPRIL 5 MG/1
5 TABLET ORAL DAILY
Refills: 0 | COMMUNITY
Start: 2022-03-01

## 2022-03-01 RX ORDER — MULTIVIT-MIN/IRON/FOLIC ACID/K 18-600-40
CAPSULE ORAL
Qty: 30 CAPSULE | Refills: 0 | COMMUNITY
Start: 2022-03-01

## 2022-03-01 NOTE — PATIENT INSTRUCTIONS
Shingles vaccine is available at local pharmacy. See gyn Dr Cecille Toribio. Dexa bone density test.   CT scan lung cancer screening. See Dr Cuco Martinez.

## 2022-03-02 ENCOUNTER — TELEPHONE (OUTPATIENT)
Dept: INTERNAL MEDICINE CLINIC | Facility: CLINIC | Age: 62
End: 2022-03-02

## 2022-09-06 ENCOUNTER — OFFICE VISIT (OUTPATIENT)
Dept: PHYSICAL MEDICINE AND REHAB | Facility: CLINIC | Age: 62
End: 2022-09-06
Payer: COMMERCIAL

## 2022-09-06 VITALS
HEART RATE: 81 BPM | SYSTOLIC BLOOD PRESSURE: 128 MMHG | BODY MASS INDEX: 21.38 KG/M2 | WEIGHT: 133 LBS | DIASTOLIC BLOOD PRESSURE: 80 MMHG | HEIGHT: 66 IN | RESPIRATION RATE: 16 BRPM | OXYGEN SATURATION: 99 %

## 2022-09-06 DIAGNOSIS — G54.0 TOS (THORACIC OUTLET SYNDROME): ICD-10-CM

## 2022-09-06 DIAGNOSIS — M50.90 CERVICAL DISC DISEASE: ICD-10-CM

## 2022-09-06 DIAGNOSIS — M48.02 CERVICAL STENOSIS OF SPINE: ICD-10-CM

## 2022-09-06 DIAGNOSIS — M50.20 CERVICAL HERNIATED DISC: Primary | ICD-10-CM

## 2022-09-06 DIAGNOSIS — M54.12 CERVICAL RADICULOPATHY: ICD-10-CM

## 2022-09-06 PROCEDURE — 99214 OFFICE O/P EST MOD 30 MIN: CPT | Performed by: PHYSICAL MEDICINE & REHABILITATION

## 2022-09-06 RX ORDER — CLOBETASOL PROPIONATE 0.5 MG/G
OINTMENT TOPICAL
COMMUNITY
Start: 2022-07-11

## 2022-09-06 NOTE — PATIENT INSTRUCTIONS
Plan  I will do a left C7-T1 ILESI. She will get into the PT for the cervical spine and the thoracic outlet. She will take the Advil for the pain. The patient will follow up in 2-3 months, but the patient will call me 2 weeks after having the injection to let me know how the injection worked.

## 2022-09-07 ENCOUNTER — TELEPHONE (OUTPATIENT)
Dept: NEUROLOGY | Facility: CLINIC | Age: 62
End: 2022-09-07

## 2022-09-07 NOTE — TELEPHONE ENCOUNTER
Patient has been scheduled for  Left C7-T1 ILESI  on 09/16/22 at the [facility] with . -Anesthesia type: LOCAL. -If scheduling [address] covid testing required for all procedures whether patient is vaccinated or not. -Patient informed not to eat or drink anything after midnight the night prior to the procedure, if being sedated. -Patient was advised that if he/she does receive the covid vaccine it needs to be at least 2 weeks before or after the injection. -Medications and allergies reviewed. -Patient reminded to hold NSAIDs (Ibuprofen, ASA 81, Aleve, Naproxen, Mobic, Diclofenac, Etodolac, Celebrex etc.) for 3 days prior to [facility]  if BMI is greater than 35. For Cervical injections only hold multivitamins, Vitamin E, Fish Oil, Phentermine (Lomaira) for 7 days prior to injection and NSAIDS.  mg to be held for 7 days prior to injections.  -If patient is receiving MAC/IVCS Phentermine Dallie Gustafson) will need to be held for 7 days prior to injection.  -If on blood thinner clearance has been received to hold this medication by provider.   -Patient informed he/she will need a  to and from procedure. -[facility] is located in the [facility] 1st floor. Patient may park in the yellow parking. Patient verbalized understanding and agrees with plan.  -----> Scheduled in Epic: Yes  -----> Scheduled in [employer]:  Yes

## 2022-09-07 NOTE — TELEPHONE ENCOUNTER
[employer] for authorization of Left C7-T1 ILESI cpt codes U8810770, [id number]. Authorization # [id number] valid 09/09/2022 - 10/08/2022. Will  inform  Nursing.

## 2022-09-16 ENCOUNTER — OFFICE VISIT (OUTPATIENT)
Dept: SURGERY | Facility: CLINIC | Age: 62
End: 2022-09-16

## 2022-09-16 DIAGNOSIS — M50.20 CERVICAL HERNIATED DISC: ICD-10-CM

## 2022-09-16 DIAGNOSIS — M50.90 CERVICAL DISC DISEASE: ICD-10-CM

## 2022-09-16 DIAGNOSIS — M54.12 CERVICAL RADICULOPATHY: Primary | ICD-10-CM

## 2022-09-16 PROCEDURE — 62321 NJX INTERLAMINAR CRV/THRC: CPT | Performed by: PHYSICAL MEDICINE & REHABILITATION

## 2022-09-16 NOTE — PROCEDURES
[address] [address]    CERVICAL INTERLAMINAR  NAME:  Isma Kennedy    MR #:    [id number] :  1960     PHYSICIAN:  Chelita Carlson MD        Operative Report    DATE OF PROCEDURE: 2022   PREOPERATIVE DIAGNOSES: 1. left C6 radiculopathy    2. C2-3 mild central, C5-6 right paracentral mild herniated discs    3. C3-4 right mild foraminal, C4-5 left mild foraminal, C5-6 mild-mod diffuse, C6-7 mild diffuse bulging discs        POSTOPERATIVE DIAGNOSES:   1. left C6 radiculopathy    2. C2-3 mild central, C5-6 right paracentral mild herniated discs    3. C3-4 right mild foraminal, C4-5 left mild foraminal, C5-6 mild-mod diffuse, C6-7 mild diffuse bulging discs        PROCEDURES: Left C7-T1 inter laminar epidural steroid injection done under fluoroscopic guidance with contrast enhancement. SURGEON: Chelita Timmons MD   ANESTHESIA: Local   INDICATIONS:      OPERATIVE PROCEDURE:  Written consent was obtained from the patient. The patient was brought into the operating room and placed in the prone position on the fluoroscopy table with pillow underneath the chest and shoulders. The patient's skin was cleaned and draped in a normal sterile fashion. Using AP fluoroscopy, the left C7 lamina was identified. Skin was anesthetized with 1% PF lidocaine without epinephrine. Then, a 3-1/2 inch, 20-gauge Tuohy needle was inserted and directed towards the left C7 lamina. When it was engaged, it was walked inferiorly and medially until it was felt to drop off the inferomedial aspect. Then, Omnipaque-240 contrast was used to obtain a good epidurogram indicating correct needle placement. Then, aspiration was performed. No blood, fluid, or air was aspirated. Then, the patient was injected with a 6 cc solution of 2 cc of normal sterile saline and 2 cc of 1% PF lidocaine without epinephrine, and 2 cc of 6 mg/cc of Celestone Soluspan. Then, the needle was removed. The patient's skin was cleaned.   A Band-Aid was applied. The patient was transferred to the cart and into [facility]. The patient was given discharge instructions and will follow up in the clinic as scheduled. Throughout the whole procedure, the patient's pulse oximetry and vital signs were monitored and they remained completely stable. Also, throughout the whole procedure, prior to injection of any medication, aspiration was performed. No blood, fluid, or air was aspirated at anytime.

## 2022-11-07 ENCOUNTER — TELEPHONE (OUTPATIENT)
Dept: INTERNAL MEDICINE CLINIC | Facility: CLINIC | Age: 62
End: 2022-11-07

## 2022-11-07 NOTE — TELEPHONE ENCOUNTER
Pt. Called stating he has a few stitches on her fore head that need to be removed. On Wednesday it will be 9 days since sutures were placed. No appts. Available. Please advise.

## 2022-11-09 ENCOUNTER — OFFICE VISIT (OUTPATIENT)
Dept: INTERNAL MEDICINE CLINIC | Facility: CLINIC | Age: 62
End: 2022-11-09
Payer: COMMERCIAL

## 2022-11-09 VITALS
HEIGHT: 66 IN | BODY MASS INDEX: 21.53 KG/M2 | DIASTOLIC BLOOD PRESSURE: 56 MMHG | TEMPERATURE: 98 F | OXYGEN SATURATION: 99 % | RESPIRATION RATE: 16 BRPM | SYSTOLIC BLOOD PRESSURE: 112 MMHG | HEART RATE: 78 BPM | WEIGHT: 134 LBS

## 2022-11-09 DIAGNOSIS — Z48.02 VISIT FOR SUTURE REMOVAL: ICD-10-CM

## 2022-11-09 DIAGNOSIS — S01.81XD LACERATION OF FOREHEAD, SUBSEQUENT ENCOUNTER: Primary | ICD-10-CM

## 2022-11-09 PROCEDURE — 3078F DIAST BP <80 MM HG: CPT | Performed by: INTERNAL MEDICINE

## 2022-11-09 PROCEDURE — 3008F BODY MASS INDEX DOCD: CPT | Performed by: INTERNAL MEDICINE

## 2022-11-09 PROCEDURE — 90686 IIV4 VACC NO PRSV 0.5 ML IM: CPT | Performed by: INTERNAL MEDICINE

## 2022-11-09 PROCEDURE — 90471 IMMUNIZATION ADMIN: CPT | Performed by: INTERNAL MEDICINE

## 2022-11-09 PROCEDURE — 99211 OFF/OP EST MAY X REQ PHY/QHP: CPT | Performed by: INTERNAL MEDICINE

## 2022-11-09 PROCEDURE — 3074F SYST BP LT 130 MM HG: CPT | Performed by: INTERNAL MEDICINE

## 2022-11-09 RX ORDER — DEXTROMETHORPHAN HYDROBROMIDE AND PROMETHAZINE HYDROCHLORIDE 15; 6.25 MG/5ML; MG/5ML
SYRUP ORAL
COMMUNITY
Start: 2022-10-30

## 2022-11-09 RX ORDER — AZITHROMYCIN 250 MG/1
500 TABLET, FILM COATED ORAL DAILY
COMMUNITY
Start: 2022-10-30 | End: 2022-11-09 | Stop reason: ALTCHOICE

## 2022-11-09 RX ORDER — ALBUTEROL SULFATE 90 UG/1
AEROSOL, METERED RESPIRATORY (INHALATION)
COMMUNITY
Start: 2022-10-30

## 2022-11-09 RX ORDER — METHYLPREDNISOLONE 4 MG/1
TABLET ORAL
COMMUNITY
Start: 2022-10-30 | End: 2022-11-09 | Stop reason: ALTCHOICE

## 2022-12-02 RX ORDER — ROSUVASTATIN CALCIUM 5 MG/1
TABLET, COATED ORAL
Qty: 90 TABLET | Refills: 3 | Status: SHIPPED | OUTPATIENT
Start: 2022-12-02

## 2022-12-07 ENCOUNTER — TELEPHONE (OUTPATIENT)
Dept: INTERNAL MEDICINE CLINIC | Facility: CLINIC | Age: 62
End: 2022-12-07

## 2022-12-07 ENCOUNTER — HOSPITAL ENCOUNTER (OUTPATIENT)
Dept: BONE DENSITY | Facility: HOSPITAL | Age: 62
Discharge: HOME | End: 2022-12-07
Attending: INTERNAL MEDICINE
Payer: COMMERCIAL

## 2022-12-07 DIAGNOSIS — Z78.0 POSTMENOPAUSAL: ICD-10-CM

## 2022-12-07 PROCEDURE — 77080 DXA BONE DENSITY AXIAL: CPT | Performed by: INTERNAL MEDICINE

## 2022-12-07 NOTE — TELEPHONE ENCOUNTER
12/7/22--DEXA-osteopenia. I note patient has seen results. I sent patient [employer] message.

## 2022-12-30 ENCOUNTER — HOSPITAL ENCOUNTER (OUTPATIENT)
Dept: CT IMAGING | Facility: HOSPITAL | Age: 62
Discharge: HOME | End: 2022-12-30
Attending: INTERNAL MEDICINE
Payer: COMMERCIAL

## 2022-12-30 DIAGNOSIS — F17.200 SMOKER: ICD-10-CM

## 2022-12-30 PROCEDURE — 71250 CT THORAX DX C-: CPT | Performed by: INTERNAL MEDICINE

## 2023-01-06 ENCOUNTER — TELEPHONE (OUTPATIENT)
Dept: INTERNAL MEDICINE CLINIC | Facility: CLINIC | Age: 63
End: 2023-01-06

## 2023-01-06 NOTE — TELEPHONE ENCOUNTER
Results noted  12/30/22--CT chest--2 cm consolidative airspace opacity in lingula suggestive of scar or atelectasis new from 2018 CT, emphysema, coronary artery calcifications. Radiologist recom repeat CT in 6 mo. I note pt has seen results. I sent patient [employer] message. Rec--repeat CT chest in 6 months and  follow-up with her cardiologist, Dr. Dillon Rivera regarding coronary artery calcifications. Patient has appointment to see me 3/14/2023.

## 2023-01-16 RX ORDER — DICYCLOMINE HYDROCHLORIDE 10 MG/1
CAPSULE ORAL
Qty: 270 CAPSULE | Refills: 0 | Status: SHIPPED | OUTPATIENT
Start: 2023-01-16

## 2023-03-14 ENCOUNTER — OFFICE VISIT (OUTPATIENT)
Dept: INTERNAL MEDICINE CLINIC | Facility: CLINIC | Age: 63
End: 2023-03-14

## 2023-03-14 VITALS
TEMPERATURE: 98 F | SYSTOLIC BLOOD PRESSURE: 120 MMHG | OXYGEN SATURATION: 98 % | DIASTOLIC BLOOD PRESSURE: 74 MMHG | HEIGHT: 66 IN | BODY MASS INDEX: 22.5 KG/M2 | HEART RATE: 64 BPM | WEIGHT: 140 LBS

## 2023-03-14 DIAGNOSIS — E78.00 HYPERCHOLESTEROLEMIA: ICD-10-CM

## 2023-03-14 DIAGNOSIS — R07.9 CHEST PAIN, UNSPECIFIED TYPE: ICD-10-CM

## 2023-03-14 DIAGNOSIS — I35.1 AORTIC VALVE INSUFFICIENCY, ETIOLOGY OF CARDIAC VALVE DISEASE UNSPECIFIED: ICD-10-CM

## 2023-03-14 DIAGNOSIS — I10 HYPERTENSION, ESSENTIAL: ICD-10-CM

## 2023-03-14 DIAGNOSIS — Z98.890 HISTORY OF STENT INSERTION OF RENAL ARTERY: ICD-10-CM

## 2023-03-14 DIAGNOSIS — Z00.00 ANNUAL PHYSICAL EXAM: Primary | ICD-10-CM

## 2023-03-14 DIAGNOSIS — F32.A ANXIETY AND DEPRESSION: ICD-10-CM

## 2023-03-14 DIAGNOSIS — F17.200 SMOKER: ICD-10-CM

## 2023-03-14 DIAGNOSIS — F41.9 ANXIETY AND DEPRESSION: ICD-10-CM

## 2023-03-14 DIAGNOSIS — R93.89 ABNORMAL CT OF THE CHEST: ICD-10-CM

## 2023-03-14 DIAGNOSIS — J44.9 CHRONIC OBSTRUCTIVE PULMONARY DISEASE, UNSPECIFIED COPD TYPE (HCC): ICD-10-CM

## 2023-03-14 LAB
ATRIAL RATE: 77 BPM
P AXIS: 63 DEGREES
P-R INTERVAL: 134 MS
Q-T INTERVAL: 426 MS
QRS DURATION: 136 MS
QTC CALCULATION (BEZET): 482 MS
R AXIS: -4 DEGREES
T AXIS: 33 DEGREES
VENTRICULAR RATE: 77 BPM

## 2023-03-14 PROCEDURE — 93000 ELECTROCARDIOGRAM COMPLETE: CPT | Performed by: INTERNAL MEDICINE

## 2023-03-14 PROCEDURE — 3078F DIAST BP <80 MM HG: CPT | Performed by: INTERNAL MEDICINE

## 2023-03-14 PROCEDURE — 3008F BODY MASS INDEX DOCD: CPT | Performed by: INTERNAL MEDICINE

## 2023-03-14 PROCEDURE — 3074F SYST BP LT 130 MM HG: CPT | Performed by: INTERNAL MEDICINE

## 2023-03-14 PROCEDURE — 99396 PREV VISIT EST AGE 40-64: CPT | Performed by: INTERNAL MEDICINE

## 2023-03-14 RX ORDER — BUPROPION HYDROCHLORIDE 300 MG/1
300 TABLET ORAL DAILY
Qty: 90 TABLET | Refills: 3 | Status: SHIPPED | OUTPATIENT
Start: 2023-03-14

## 2023-04-13 RX ORDER — BUPROPION HYDROCHLORIDE 150 MG/1
TABLET ORAL
Qty: 90 TABLET | Refills: 3 | OUTPATIENT
Start: 2023-04-13

## 2023-05-26 ENCOUNTER — TELEPHONE (OUTPATIENT)
Dept: INTERNAL MEDICINE CLINIC | Facility: CLINIC | Age: 63
End: 2023-05-26

## 2023-05-26 RX ORDER — DICYCLOMINE HYDROCHLORIDE 10 MG/1
10 CAPSULE ORAL 3 TIMES DAILY PRN
Qty: 270 CAPSULE | Refills: 0 | Status: CANCELLED | OUTPATIENT
Start: 2023-05-26

## 2023-06-02 RX ORDER — DICYCLOMINE HYDROCHLORIDE 10 MG/1
10 CAPSULE ORAL 3 TIMES DAILY PRN
Qty: 270 CAPSULE | Refills: 0 | Status: SHIPPED | OUTPATIENT
Start: 2023-06-02

## 2023-06-12 ENCOUNTER — TELEPHONE (OUTPATIENT)
Dept: INTERNAL MEDICINE CLINIC | Facility: CLINIC | Age: 63
End: 2023-06-12

## 2023-06-12 NOTE — TELEPHONE ENCOUNTER
Modesto Casey from [employer] called stating Fransico Garcia called there on 6/5/23 requesting an Appeal for the denial of a CT of chest that was done on December 30, 2022. Modesto Casey stated that their Physicians were not able To approve the CT of Chest.  They will be sending a written denial here with all of the details as to why.

## 2023-06-12 NOTE — TELEPHONE ENCOUNTER
To FD - please watch out for incoming written denial - ask Lambert Langston where it should go -since  retired thanks

## 2023-06-22 NOTE — TELEPHONE ENCOUNTER
To Dr Annabel Flanagan - Previous pt of Dr Jhoan Angel has f/u appt with you 7/17/23. Please advise on next steps. Fax placed in your inbox for review.

## 2023-06-24 ENCOUNTER — LAB ENCOUNTER (OUTPATIENT)
Dept: LAB | Facility: HOSPITAL | Age: 63
End: 2023-06-24
Attending: INTERNAL MEDICINE
Payer: COMMERCIAL

## 2023-06-24 ENCOUNTER — HOSPITAL ENCOUNTER (OUTPATIENT)
Dept: CT IMAGING | Facility: HOSPITAL | Age: 63
Discharge: HOME | End: 2023-06-24
Attending: INTERNAL MEDICINE
Payer: COMMERCIAL

## 2023-06-24 DIAGNOSIS — R93.89 ABNORMAL CT OF THE CHEST: ICD-10-CM

## 2023-06-24 DIAGNOSIS — Z00.00 ANNUAL PHYSICAL EXAM: ICD-10-CM

## 2023-06-24 LAB
ALBUMIN SERPL-MCNC: 3.7 G/DL (ref 3.4–5)
ALBUMIN/GLOB SERPL: 1.2 {RATIO} (ref 1–2)
ALP LIVER SERPL-CCNC: 51 U/L
ALT SERPL-CCNC: 23 U/L
ANION GAP SERPL CALC-SCNC: 5 MMOL/L (ref 0–18)
AST SERPL-CCNC: 16 U/L (ref 15–37)
BASOPHILS # BLD AUTO: 0.05 X10(3) UL (ref 0–0.2)
BASOPHILS NFR BLD AUTO: 0.5 %
BILIRUB SERPL-MCNC: 0.4 MG/DL (ref 0.1–2)
BUN BLD-MCNC: 14 MG/DL (ref 7–18)
BUN/CREAT SERPL: 15.4 (ref 10–20)
CALCIUM BLD-MCNC: 9.4 MG/DL (ref 8.5–10.1)
CHLORIDE SERPL-SCNC: 113 MMOL/L (ref 98–112)
CHOLEST SERPL-MCNC: 155 MG/DL (ref ?–200)
CO2 SERPL-SCNC: 25 MMOL/L (ref 21–32)
CREAT BLD-MCNC: 0.91 MG/DL
DEPRECATED RDW RBC AUTO: 42.1 FL (ref 35.1–46.3)
EOSINOPHIL # BLD AUTO: 0.15 X10(3) UL (ref 0–0.7)
EOSINOPHIL NFR BLD AUTO: 1.5 %
ERYTHROCYTE [DISTWIDTH] IN BLOOD BY AUTOMATED COUNT: 12.7 % (ref 11–15)
FASTING PATIENT LIPID ANSWER: YES
FASTING STATUS PATIENT QL REPORTED: YES
GFR SERPLBLD BASED ON 1.73 SQ M-ARVRAT: 70 ML/MIN/1.73M2 (ref 60–?)
GLOBULIN PLAS-MCNC: 3.1 G/DL (ref 2.8–4.4)
GLUCOSE BLD-MCNC: 99 MG/DL (ref 70–99)
HCT VFR BLD AUTO: 39.8 %
HDLC SERPL-MCNC: 69 MG/DL (ref 40–59)
HGB BLD-MCNC: 13.3 G/DL
IMM GRANULOCYTES # BLD AUTO: 0.07 X10(3) UL (ref 0–1)
IMM GRANULOCYTES NFR BLD: 0.7 %
LDLC SERPL CALC-MCNC: 74 MG/DL (ref ?–100)
LYMPHOCYTES # BLD AUTO: 2.33 X10(3) UL (ref 1–4)
LYMPHOCYTES NFR BLD AUTO: 23.6 %
MCH RBC QN AUTO: 30 PG (ref 26–34)
MCHC RBC AUTO-ENTMCNC: 33.4 G/DL (ref 31–37)
MCV RBC AUTO: 89.8 FL
MONOCYTES # BLD AUTO: 0.66 X10(3) UL (ref 0.1–1)
MONOCYTES NFR BLD AUTO: 6.7 %
NEUTROPHILS # BLD AUTO: 6.62 X10 (3) UL (ref 1.5–7.7)
NEUTROPHILS # BLD AUTO: 6.62 X10(3) UL (ref 1.5–7.7)
NEUTROPHILS NFR BLD AUTO: 67 %
NONHDLC SERPL-MCNC: 86 MG/DL (ref ?–130)
OSMOLALITY SERPL CALC.SUM OF ELEC: 297 MOSM/KG (ref 275–295)
PLATELET # BLD AUTO: 196 10(3)UL (ref 150–450)
POTASSIUM SERPL-SCNC: 4.1 MMOL/L (ref 3.5–5.1)
PROT SERPL-MCNC: 6.8 G/DL (ref 6.4–8.2)
RBC # BLD AUTO: 4.43 X10(6)UL
SODIUM SERPL-SCNC: 143 MMOL/L (ref 136–145)
TRIGL SERPL-MCNC: 59 MG/DL (ref 30–149)
TSI SER-ACNC: 1.15 MIU/ML (ref 0.36–3.74)
VLDLC SERPL CALC-MCNC: 9 MG/DL (ref 0–30)
WBC # BLD AUTO: 9.9 X10(3) UL (ref 4–11)

## 2023-06-24 PROCEDURE — 36415 COLL VENOUS BLD VENIPUNCTURE: CPT

## 2023-06-24 PROCEDURE — 80053 COMPREHEN METABOLIC PANEL: CPT

## 2023-06-24 PROCEDURE — 84443 ASSAY THYROID STIM HORMONE: CPT

## 2023-06-24 PROCEDURE — 71250 CT THORAX DX C-: CPT | Performed by: INTERNAL MEDICINE

## 2023-06-24 PROCEDURE — 85025 COMPLETE CBC W/AUTO DIFF WBC: CPT

## 2023-06-24 PROCEDURE — 80061 LIPID PANEL: CPT

## 2023-06-26 ENCOUNTER — TELEPHONE (OUTPATIENT)
Dept: INTERNAL MEDICINE CLINIC | Facility: CLINIC | Age: 63
End: 2023-06-26

## 2023-06-26 NOTE — TELEPHONE ENCOUNTER
Please notify the patient that I reviewed her blood test from 6/24 which overall look good. Namely good control of her cholesterol levels. CT scan from 6-24 did show 4 mm benign-appearing nodule that has remained stable. The inflammation from the prior CT scan of the chest in December has resolved. We will be working on the CT scan appeal as requested.     No new recommendations for now, we will perform clinical evaluation when I see her in clinic 7/17

## 2023-07-15 ENCOUNTER — HOSPITAL ENCOUNTER (OUTPATIENT)
Dept: CV DIAGNOSTICS | Facility: HOSPITAL | Age: 63
Discharge: HOME | End: 2023-07-15
Attending: INTERNAL MEDICINE
Payer: COMMERCIAL

## 2023-07-15 DIAGNOSIS — R07.9 CHEST PAIN, UNSPECIFIED TYPE: ICD-10-CM

## 2023-07-15 PROCEDURE — 93018 CV STRESS TEST I&R ONLY: CPT | Performed by: INTERNAL MEDICINE

## 2023-07-15 PROCEDURE — 93350 STRESS TTE ONLY: CPT | Performed by: INTERNAL MEDICINE

## 2023-07-15 PROCEDURE — 93017 CV STRESS TEST TRACING ONLY: CPT | Performed by: INTERNAL MEDICINE

## 2023-07-18 ENCOUNTER — TELEPHONE (OUTPATIENT)
Dept: INTERNAL MEDICINE CLINIC | Facility: CLINIC | Age: 63
End: 2023-07-18

## 2023-07-18 NOTE — TELEPHONE ENCOUNTER
Prior Authorization needed for     Flovent HFA 44 mcg oral inh 120inh    Plan does not cover medication (must use Pulmicort Flexhaler)   Please call plan at [phone] or call pharmacy to change  ID# [id number]    Fax placed in purple folder

## 2023-07-19 ENCOUNTER — TELEPHONE (OUTPATIENT)
Dept: INTERNAL MEDICINE CLINIC | Facility: CLINIC | Age: 63
End: 2023-07-19

## 2023-07-19 NOTE — TELEPHONE ENCOUNTER
Received request via fax from [employer]. The dept of insurance sent forms for patient to fill out. We do not know why we would receive these forms. ?Called patient and LMTCB. Need explanation from patient,Need to also get this paper work to patient. There is nothing on these  forms that require a Dr to fill out. Placed in Dr Bill Chavez mail box.

## 2023-07-21 RX ORDER — BUDESONIDE 90 UG/1
1 AEROSOL, POWDER RESPIRATORY (INHALATION) 2 TIMES DAILY
Qty: 60 EACH | Refills: 5 | Status: SHIPPED | OUTPATIENT
Start: 2023-07-21

## 2023-08-04 NOTE — TELEPHONE ENCOUNTER
Patient called back and asked that we please mail the forms to her home address on file. Patient is not sure what the forms are for or why our office received them. Address on file is correct. Verified with patient.    [address] [place] [address], [address] [place] [address] ambulatory

## 2023-09-01 RX ORDER — DICYCLOMINE HYDROCHLORIDE 10 MG/1
10 CAPSULE ORAL 3 TIMES DAILY PRN
Qty: 270 CAPSULE | Refills: 0 | OUTPATIENT
Start: 2023-09-01

## 2023-11-05 DIAGNOSIS — J44.9 CHRONIC OBSTRUCTIVE PULMONARY DISEASE, UNSPECIFIED COPD TYPE (HCC): ICD-10-CM

## 2023-11-09 RX ORDER — ALBUTEROL SULFATE 90 UG/1
2 AEROSOL, METERED RESPIRATORY (INHALATION) EVERY 6 HOURS PRN
Qty: 3 EACH | Refills: 3 | Status: SHIPPED | OUTPATIENT
Start: 2023-11-09

## 2023-11-09 NOTE — TELEPHONE ENCOUNTER
Refill request is for a maintenance medication and has met the criteria specified in the Ambulatory Medication Refill Standing Order for eligibility, visits, laboratory, alerts and was sent to the requested pharmacy.     Requested Prescriptions     Signed Prescriptions Disp Refills    albuterol 108 (90 Base) MCG/ACT Inhalation Aero Soln 3 each 3     Sig: INHALE 2 PUFFS BY MOUTH INTO THE LUNGS EVERY 6 HOURS AS NEEDED FOR WHEEZING     Authorizing Provider: Vicky Black     Ordering User: Shandra Kinney

## 2023-12-15 ENCOUNTER — PATIENT MESSAGE (OUTPATIENT)
Dept: INTERNAL MEDICINE CLINIC | Facility: CLINIC | Age: 63
End: 2023-12-15

## 2023-12-15 DIAGNOSIS — Z13.0 SCREENING FOR DEFICIENCY ANEMIA: ICD-10-CM

## 2023-12-15 DIAGNOSIS — Z13.1 SCREENING FOR DIABETES MELLITUS: Primary | ICD-10-CM

## 2023-12-15 DIAGNOSIS — Z13.29 SCREENING FOR THYROID DISORDER: ICD-10-CM

## 2023-12-15 DIAGNOSIS — Z13.220 SCREENING FOR LIPOID DISORDERS: ICD-10-CM

## 2023-12-15 DIAGNOSIS — E55.9 VITAMIN D DEFICIENCY: ICD-10-CM

## 2023-12-18 NOTE — TELEPHONE ENCOUNTER
From: Melo Yap  To: Armando Norman  Sent: 12/15/2023 1:05 PM CST  Subject: Tests    Are there any tests I need to have completed before my 12/22 appointment with Dr. Miguel Angel Dubois? Thank you.

## 2023-12-22 ENCOUNTER — LAB ENCOUNTER (OUTPATIENT)
Dept: LAB | Facility: HOSPITAL | Age: 63
End: 2023-12-22
Attending: INTERNAL MEDICINE
Payer: COMMERCIAL

## 2023-12-22 DIAGNOSIS — Z13.220 SCREENING FOR LIPOID DISORDERS: ICD-10-CM

## 2023-12-22 DIAGNOSIS — Z13.1 SCREENING FOR DIABETES MELLITUS: ICD-10-CM

## 2023-12-22 DIAGNOSIS — Z13.0 SCREENING FOR DEFICIENCY ANEMIA: ICD-10-CM

## 2023-12-22 DIAGNOSIS — E55.9 VITAMIN D DEFICIENCY: ICD-10-CM

## 2023-12-22 DIAGNOSIS — Z13.29 SCREENING FOR THYROID DISORDER: ICD-10-CM

## 2023-12-22 LAB
ALBUMIN SERPL-MCNC: 4.4 G/DL (ref 3.2–4.8)
ALBUMIN/GLOB SERPL: 2 {RATIO} (ref 1–2)
ALP LIVER SERPL-CCNC: 61 U/L
ALT SERPL-CCNC: 21 U/L
ANION GAP SERPL CALC-SCNC: 4 MMOL/L (ref 0–18)
AST SERPL-CCNC: 20 U/L (ref ?–34)
BASOPHILS # BLD AUTO: 0.04 X10(3) UL (ref 0–0.2)
BASOPHILS NFR BLD AUTO: 0.4 %
BILIRUB SERPL-MCNC: 0.4 MG/DL (ref 0.2–1.1)
BUN BLD-MCNC: 13 MG/DL (ref 9–23)
BUN/CREAT SERPL: 13.5 (ref 10–20)
CALCIUM BLD-MCNC: 9.7 MG/DL (ref 8.7–10.4)
CHLORIDE SERPL-SCNC: 111 MMOL/L (ref 98–112)
CHOLEST SERPL-MCNC: 199 MG/DL (ref ?–200)
CO2 SERPL-SCNC: 27 MMOL/L (ref 21–32)
CREAT BLD-MCNC: 0.96 MG/DL
DEPRECATED RDW RBC AUTO: 43.1 FL (ref 35.1–46.3)
EGFRCR SERPLBLD CKD-EPI 2021: 66 ML/MIN/1.73M2 (ref 60–?)
EOSINOPHIL # BLD AUTO: 0.3 X10(3) UL (ref 0–0.7)
EOSINOPHIL NFR BLD AUTO: 3.3 %
ERYTHROCYTE [DISTWIDTH] IN BLOOD BY AUTOMATED COUNT: 12.9 % (ref 11–15)
FASTING PATIENT LIPID ANSWER: YES
FASTING STATUS PATIENT QL REPORTED: YES
GLOBULIN PLAS-MCNC: 2.2 G/DL (ref 2.8–4.4)
GLUCOSE BLD-MCNC: 92 MG/DL (ref 70–99)
HCT VFR BLD AUTO: 41.6 %
HDLC SERPL-MCNC: 69 MG/DL (ref 40–59)
HGB BLD-MCNC: 13.8 G/DL
IMM GRANULOCYTES # BLD AUTO: 0.06 X10(3) UL (ref 0–1)
IMM GRANULOCYTES NFR BLD: 0.7 %
LDLC SERPL CALC-MCNC: 122 MG/DL (ref ?–100)
LYMPHOCYTES # BLD AUTO: 1.66 X10(3) UL (ref 1–4)
LYMPHOCYTES NFR BLD AUTO: 18.3 %
MCH RBC QN AUTO: 30.2 PG (ref 26–34)
MCHC RBC AUTO-ENTMCNC: 33.2 G/DL (ref 31–37)
MCV RBC AUTO: 91 FL
MONOCYTES # BLD AUTO: 0.68 X10(3) UL (ref 0.1–1)
MONOCYTES NFR BLD AUTO: 7.5 %
NEUTROPHILS # BLD AUTO: 6.34 X10 (3) UL (ref 1.5–7.7)
NEUTROPHILS # BLD AUTO: 6.34 X10(3) UL (ref 1.5–7.7)
NEUTROPHILS NFR BLD AUTO: 69.8 %
NONHDLC SERPL-MCNC: 130 MG/DL (ref ?–130)
OSMOLALITY SERPL CALC.SUM OF ELEC: 294 MOSM/KG (ref 275–295)
PLATELET # BLD AUTO: 224 10(3)UL (ref 150–450)
POTASSIUM SERPL-SCNC: 3.7 MMOL/L (ref 3.5–5.1)
PROT SERPL-MCNC: 6.6 G/DL (ref 5.7–8.2)
RBC # BLD AUTO: 4.57 X10(6)UL
SODIUM SERPL-SCNC: 142 MMOL/L (ref 136–145)
TRIGL SERPL-MCNC: 44 MG/DL (ref 30–149)
TSI SER-ACNC: 1.27 MIU/ML (ref 0.55–4.78)
VIT D+METAB SERPL-MCNC: 43.2 NG/ML (ref 30–100)
VLDLC SERPL CALC-MCNC: 8 MG/DL (ref 0–30)
WBC # BLD AUTO: 9.1 X10(3) UL (ref 4–11)

## 2023-12-22 PROCEDURE — 84443 ASSAY THYROID STIM HORMONE: CPT

## 2023-12-22 PROCEDURE — 80053 COMPREHEN METABOLIC PANEL: CPT

## 2023-12-22 PROCEDURE — 82306 VITAMIN D 25 HYDROXY: CPT

## 2023-12-22 PROCEDURE — 80061 LIPID PANEL: CPT

## 2023-12-22 PROCEDURE — 85025 COMPLETE CBC W/AUTO DIFF WBC: CPT

## 2023-12-22 PROCEDURE — 36415 COLL VENOUS BLD VENIPUNCTURE: CPT

## 2024-03-03 NOTE — PATIENT INSTRUCTIONS
- You were seen in clinic for regular annual check-up.  We did review your most recent set of blood work with slight elevation your cholesterol levels.  We will continue with good nutrition, exercise and targeting slight weight loss over time    We did focus on your ongoing chronic pain.  Continue with over-the-counter as needed medication such as Tylenol, ibuprofen  Continue with home stretches and exercises in the meantime  Seems that we are requiring surgical intervention given the progression of symptoms, continue following with Dr. Lawler    Noted, we will provide medical clearance.  He may also need cardiology clearance with Dr. Nuñez  - We are more than happy to place blood tests for you.      -May be due for mammogram later this year, okay to wait until seen by Dr. Herrera for usual well woman examination.  -Please continue checking your blood pressures at home and notify us if they are increasing  - Continue following with Dr. Nuñez cardiology for management of blood pressure, cholesterol  -May be due for COVID-vaccine series, flu shot, Prevnar 20/pneumonia shot  - Please continue to eat a varied diet including recommended servings of vegetables, fruits, and low fat dairy. Minimize high saturated fats (such as fast foods) and high sugar intake (such as soda)  - We recommend 150 minutes of moderate intensity exercise (brisk walking, swimming) weekly to maintain your current weight.  Targeted weight loss will require more vigorous exercise or more than 150 minutes/week.    Return to clinic in 4 months for follow-up

## 2024-03-03 NOTE — H&P
Anita Bingham is a 63 year old female.    HPI:     Chief Complaint   Patient presents with    Follow - Up     Pt here for general f/u      Ms. BINGHAM is a 63 year old female PMHX hypertension, hyperlipidemia, COPD, cervical radiculopathy, IBS, history of atypical ductal hyperplasia, coming in for annual physical examination    Overall doing well. She may be undergoing through an orthopedic surgery. She has multiple bulging discs of cervical. Has had multiple epidural injections. Ongoing for years, worse recently in the last 6 months.     Tried FODMAP diet, avoiding gluten. Still with bloating from time to time. She has had improvement since the last visit. Intermittent constipation alternating with diarrhea. No nausea.     5.5 - 6 hours from neck pain, then goes to the bathroom. Some anxiety with life changes. Daily stress from work. She likes the routine, but has a lot of responsibilities. Intermittent chest discomfort that has subsided - attributed to family activties.       I reviewed and updated the PMHx, FamHx, medications, allergies, and SocHx as below with the patient    OB/GYN - follows with Dr. Herrera  Last menstrual period: postmenopausal    SocHX  - Home: feels safe at home - , 7 adult children.  - Work: feels safe at work -  - new job from last year, in this department since 2008  - Hobbies: weekly pillates, Holiness activities.  - Nutrition: Tried the FODMAP - up and down. Trying to eliminate gluten. Veggies and fruits. 32 oz of water and then 64 oz of liquids  - Physical Activity: not doing activities.       HISTORY:  Past Medical History:   Diagnosis Date    Atypical ductal hyperplasia of right breast 11/2012    Needle localization R breast biopsy Dr Bahena at [facility]. path-atypical ductal hyperplasia, flat epithelial atypia, atypical lobular hyperplasia.  Rx E [place] per Dr. Bahena    Bilateral renal artery stenosis (HCC) 12/2017    Bilateral renal artery stents-12/29/17-Dr. Wong.  Left renal subcapsular hematoma post procedure. BETTE related to atherosclerosis.     Cervical arthritis 2019    MRI C-spine-11/4/19-- disc degeneration, DJD, multiple perineural nerve root sleeve cysts.    COVID-19 01/2022    Outpatient    Dilated cardiomyopathy (HCC) 08/2017    cardiac cath Dr Nuñez 8/10/17 -Coronary arteries normal. Mild global LV hypokinesis. Estimated EF 40-45%    DJD (degenerative joint disease) of cervical spine 11/04/2019    MRI cervical spine 11/4/19-degenerative disc disease, DJD, multiple perineural nerve root sleeve cysts    Essential hypertension     H/O colonoscopy 06/26/2008    Colonoscopy Dr. Card 6/26/08-hyperplastic polyp and internal hemorrhoids.    Hematoma of left kidney 12/2017    left kidney subcapsular hematoma--complication of renal artery stenting procedure    IBS diag 2009    Lipid screening 05/08/2008    Per [facility]    Musculoskeletal disorder 2001    left foot bunion (surgery)    Numbness and tingling of left arm and leg 2019    MRI brain-11/4/19-3 x 2 mm pituitary nodule c/w incidental microadenoma. 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  Heparin-then Eliquis until 12/2017.    Vitamin D deficiency 2014      Past Surgical History:   Procedure Laterality Date    BREAST BIOPSY Right 11/28/2012    R needle loc breast bx Dr Bahena at [facility]    CARPAL TUNNEL RELEASE Right 02/2021    Dr Russell    CATH RENAL STENT Bilateral 12/29/2017    Bilateral renal artery stents Dr. Wong at [facility].    COLONOSCOPY  06/28/2008    Colonoscopy Dr. Card-hyperplastic polyp and internal hemorrhoids.    D & C  1998 & 1999    missed ab    ELBOW SURGERY  Right 2020    Arthroscopy R elbow with tendon repair --Dr Getachew Russell at [facility].     FOOT SURGERY Left      L foot bunion surgery x2    KIDNEY SURGERY        Family History   Problem Relation Age of Onset    Hypertension Father     Pulmonary Disease Father         smoker    Lipids Father     Arthritis Father     Dementia Father     Heart Disease Father          age 88    Other (cancer bladder) Father 77    Hypertension Mother     Diabetes Mother     Dementia Mother 90    Other ( age 94 after hip fracture) Mother     Hypertension Other     Other (Other) Brother         L & W    Other (Other) Brother         L & W    Other (5 daughters) Daughter     Other (2 sons) Son         1 son with Aspergers    Other (ASD) Daughter 36        robotic repair    Other (cancer thyroid) Daughter 40      Social History:   Social History     Socioeconomic History    Marital status:    Occupational History    Occupation:    Tobacco Use    Smoking status: Every Day     Packs/day: 0.75     Years: 40.00     Additional pack years: 0.00     Total pack years: 30.00     Types: Cigarettes     Start date: 1978    Smokeless tobacco: Never   Vaping Use    Vaping Use: Never used   Substance and Sexual Activity    Alcohol use: Yes     Comment: occasional    Drug use: No    Sexual activity: Yes     Partners: Male     Comment: postmenopausal 17 current   Other Topics Concern    Caffeine Concern Yes     Comment: Coffee, 2-3 cups daily.    Exercise Yes     Comment: few times weekly   Social History Narrative    The patient does not use an assistive device..      The patient does live in a home with stairs.        Medications (Active prior to today's visit):  Current Outpatient Medications   Medication Sig Dispense Refill    MAGNESIUM GLYCINATE OR Take 240 mg by mouth in the morning and 240 mg before bedtime. Take two daily .      Ascorbic Acid (VITAMIN C) 1000 MG Oral Tab Take 1 tablet (1,000 mg total) by  mouth daily.      Zinc 50 MG Oral Tab Take by mouth.      albuterol 108 (90 Base) MCG/ACT Inhalation Aero Soln Inhale 2 puffs into the lungs every 6 (six) hours as needed for Wheezing. 3 each 3    rosuvastatin 5 MG Oral Tab Take 1 tablet (5 mg total) by mouth nightly. 90 tablet 3    lisinopril 5 MG Oral Tab Take 1 tablet (5 mg total) by mouth daily. 90 tablet 3    metoprolol succinate ER 25 MG Oral Tablet 24 Hr Take 0.5 tablets (12.5 mg total) by mouth daily. 45 tablet 3    Vitamin D, Cholecalciferol, 50 MCG (2000 UT) Oral Cap Take by mouth. 30 capsule 0       Allergies:  No Known Allergies      ROS:   Positive Findings indicated in BOLD    Constitutional: Fever, Chills, Weight Gain, Weight Loss, Night Sweats, Fatigue, Malaise  ENT/Mouth:  Hearing Changes, Ear Pain, Nasal Congestion, Sinus Pain, Hoarseness, Sore throat, Rhinorrhea, Swallowing Difficulty  Eyes: Eye Pain, Swelling, Redness, Foreign Body, Discharge, Vision Changes  Cardiovascular: Chest Pain, SOB, PND, Dyspnea on Exertion, Orthopnea, Claudication, Edema, Palpitations  Respiratory: Cough, Sputum, Wheezing, Shortness of breath  Gastrointestinal: Nausea, Vomiting, Diarrhea, Constipation, Pain, Heartburn, Dysphagia, Bloody stools, Tarry stools  Genitourinary: Dysmenorrhea, Dysuria, Urinary Frequency, Hematuria, Urinary Incontinence, Urgency,  Flank Pain  Musculoskeletal: Arthralgias, Myalgias, Joint Swelling, Joint Stiffness, Back Pain, Neck Pain  Integumentary: Skin Lesions, Pruritis, Hair Changes, Jaundice, Nail changes  Neuro: Weakness, Numbness, Paresthesias, Loss of Consciousness, Syncope, Dizziness, Headache, Falls  Psych: Anxiety, Depression, Insomnia, Suicidal Ideation, Homicidal ideation, Memory Changes  Heme/Lymph: Bruising, Bleeding, Lymphadenopathy  Endocrine: Polyuria, Polydipsia, Temperature Intolerance    PHYSICAL EXAM:   Vital Signs:  Blood pressure 140/78, pulse 96, temperature 98.6 °F (37 °C), height 5' 6\" (1.676 m), weight 136 lb (61.7  kg), last menstrual period 12/31/2008, SpO2 96%, not currently breastfeeding.     Constitutional: No acute distress. Alert and oriented x 3.  Eyes: EOMI, PERRLA, clear sclera b/l  HENT: NCAT, Moist mucous membranes, Oropharynx without erythema or exudates  Neck: No JVD, no thyromegaly; +mild restricted range of motion with neck flexion  Cardiovascular: S1, S2, no S3, no S4, Regular rate and rhythm, No murmurs/gallops/rubs.   Vascular: Equal pulses 2+ carotids no bruits or thrills/radial/DP/PT bilaterally  Respiratory: Clear to auscultation bilaterally.  No wheezes/rales/rhonchi  Gastrointestinal: Soft, nontender, nondistended. Positive bowel sounds x 4. No rebound tenderness. No hepatomegaly, No splenomegaly  Genitourinary: No CVA tenderness bilaterally  Neurologic: No focal neurological deficits, CN II-XII intact, light touch intact, MSK Strength 5/5 and symmetric in all extremities, normal gait, 2+ patellar tendon  Musculoskeletal: Full range of motion of all extremities, no clubbing/swelling/edema  Skin: No lesions, No erythema, no jaundice, Cap Refill < 2s  Psychiatric: Appropriate mood and affect  Heme/Lymph/Immune: No cervical LAD    Well woman examination per OB/GYN    DATA REVIEWED   Labs:  Recent Results (from the past 8760 hour(s))   CBC W/ DIFFERENTIAL    Collection Time: 12/22/23  8:30 AM   Result Value Ref Range    WBC 9.1 4.0 - 11.0 x10(3) uL    RBC 4.57 3.80 - 5.30 x10(6)uL    HGB 13.8 12.0 - 16.0 g/dL    HCT 41.6 35.0 - 48.0 %    MCV 91.0 80.0 - 100.0 fL    MCH 30.2 26.0 - 34.0 pg    MCHC 33.2 31.0 - 37.0 g/dL    RDW-SD 43.1 35.1 - 46.3 fL    RDW 12.9 11.0 - 15.0 %    .0 150.0 - 450.0 10(3)uL    Neutrophil Absolute Prelim 6.34 1.50 - 7.70 x10 (3) uL    Neutrophil Absolute 6.34 1.50 - 7.70 x10(3) uL    Lymphocyte Absolute 1.66 1.00 - 4.00 x10(3) uL    Monocyte Absolute 0.68 0.10 - 1.00 x10(3) uL    Eosinophil Absolute 0.30 0.00 - 0.70 x10(3) uL    Basophil Absolute 0.04 0.00 - 0.20 x10(3) uL     Immature Granulocyte Absolute 0.06 0.00 - 1.00 x10(3) uL    Neutrophil % 69.8 %    Lymphocyte % 18.3 %    Monocyte % 7.5 %    Eosinophil % 3.3 %    Basophil % 0.4 %    Immature Granulocyte % 0.7 %     *Note: Due to a large number of results and/or encounters for the requested time period, some results have not been displayed. A complete set of results can be found in Results Review.       Recent Results (from the past 8760 hour(s))   Comp Metabolic Panel (14)    Collection Time: 12/22/23  8:30 AM   Result Value Ref Range    Glucose 92 70 - 99 mg/dL    Sodium 142 136 - 145 mmol/L    Potassium 3.7 3.5 - 5.1 mmol/L    Chloride 111 98 - 112 mmol/L    CO2 27.0 21.0 - 32.0 mmol/L    Anion Gap 4 0 - 18 mmol/L    BUN 13 9 - 23 mg/dL    Creatinine 0.96 0.55 - 1.02 mg/dL    BUN/CREA Ratio 13.5 10.0 - 20.0    Calcium, Total 9.7 8.7 - 10.4 mg/dL    Calculated Osmolality 294 275 - 295 mOsm/kg    eGFR-Cr 66 >=60 mL/min/1.73m2    ALT 21 10 - 49 U/L    AST 20 <=34 U/L    Alkaline Phosphatase 61 50 - 130 U/L    Bilirubin, Total 0.4 0.2 - 1.1 mg/dL    Total Protein 6.6 5.7 - 8.2 g/dL    Albumin 4.4 3.2 - 4.8 g/dL    Globulin  2.2 (L) 2.8 - 4.4 g/dL    A/G Ratio 2.0 1.0 - 2.0    Patient Fasting for CMP? Yes      *Note: Due to a large number of results and/or encounters for the requested time period, some results have not been displayed. A complete set of results can be found in Results Review.       Cholesterol, Total (mg/dL)   Date Value   12/22/2023 199     HDL Cholesterol (mg/dL)   Date Value   12/22/2023 69 (H)     LDL Cholesterol (mg/dL)   Date Value   12/22/2023 122 (H)     Triglycerides (mg/dL)   Date Value   12/22/2023 44       Last A1c value was  % done  .        Imaging:  CT chest 12/30/2022  FINDINGS:  LINES AND TUBES: None.     MEDIASTINUM/VASCULATURE: There are multiple mildly prominent mediastinal lymph nodes which are nonspecific and measure less than 1 cm in short axis. There is atherosclerotic calcification of the  aortic arch and thoracic aorta. The thoracic aorta is  otherwise unremarkable and not dilated. Mediastinal fat planes are preserved. Cardiac chambers are unremarkable. Pericardium is normal. There are coronary artery calcifications. The main pulmonary artery has a normal diameter and is otherwise  unremarkable.     LUNGS AND PLEURA: There is biapical scarring and pleural thickening. Mild upper lobe predominant centrilobular and paraseptal emphysematous changes.  There is bibasilar and lingular atelectasis and scarring. No pneumothorax.  No pleural effusion. The  trachea and central airways are unremarkable.  Since the prior exam, there has been development of a 2 x 1.4 cm consolidative airspace opacity with air bronchograms within the lingula.  There is mucous plugging seen within the bilateral lower lobe and  lingular bronchi. Nodularity seen within the trachea and within the right mainstem bronchus suggestive of retained secretions.       CHEST WALL/BONES: There is degenerative disease of the thoracic spine. The chest wall and osseous structures are otherwise unremarkable.     LIMITED ABDOMEN: 0.8 cm low-attenuation lesion within the right hepatic lobe is unchanged since the prior exams. exams.               Impression   CONCLUSION:     2 cm consolidative airspace opacity within the lingula with air bronchograms is new since the prior exam and is suggestive of scar or atelectasis. Followup chest CT recommended in 6 months to demonstrate resolution.       Mucous plugging within the bilateral lower lobes and lingula.     Small amounts of retained secretions within the trachea and right mainstem bronchus.     Mild paraseptal and centrilobular emphysema.     Multiple other incidental findings as described in the body of the report which are unchanged.         CT chest 6/24/2023  Impression   CONCLUSION:  1. Previously described lingular consolidation has resolved with residual linear scar.  2. Moderate emphysema.  3.  Stable benign noncalcified 4 mm right lower lobe pulmonary nodule.  4. Stable coronary artery calcification.  5. Prior granulomatous disease in the chest.            Stress echo 7/15/2023  INTERPRETATION: This patient exercised for 9 minutes on a Scott protocol, stopping due to dyspnea and leg fatigue.  Patient achieved a peak heart rate of 137 beats per minute (89% of their APMHR) and a peak blood pressure of 146/68 mmHg.  There were 8.2 METs achieved, which is average for their age.  The EKG was negative for ischemia. No ST depression or angina occurred.  Patient denied cardiac symptoms. Rare isolated PVC's noted.     Resting echocardiographic images revealed normal chamber sizes and valvular structure with uniformly normal left ventricular contractility.     Immediate post exercise imaging revealed a decrease in left ventricular size and increased contractility of all wall segments consistent with a normal stress echocardiogram response.     IMPRESSION:  Normal stress echocardiogram.           ASSESSMENT/PLAN:          L cervical radiculopathy  -IDA 2020-Dr Timmons --L C7-T1 interlaminar YUNIOR for L C7 radiculopathy.    MRI cervical spine 10/27/20 per Dr Timmons--DDD DJD.   -Refractory to conservative management  - Following with Dr. Lawler for which there may be plans for surgical intervention  - May need cardiac clearance with Dr. Nuñez  - Will hold on lab test until we confirmed surgical date.    Anxiety and depression  Increase wellbutrin  mg daily. Referral to [facility] Carrillo navigator for counselor.   -This is improved since last visit.  Still on Wellbutrin  - Improved with change in job position and new company.  Doing well overall        Chest pain, unspecified type  15 min episode of L upper chest pain during the night this wk. Atypical.   EKG today --SR, RBBB--no change to EKG of 12/26/17.  Check CXR, stress echo.  -Most recent echo stress test showed normal stress test.    - Chest pain seems to have  resolved        Hypertension-(hx renal artery stents)   -on toprol XL 12.5 daily and per Dr Nuñez lisinopril 5 mg daily.  - Refilled home medications     Hypercholesterolemia  -crestor 5 mg daily, coenzyme Q. Check lipids. atrvastn caused leg pain 2018.  - Lipid panel borderline elevated      Aortic regurgitation  -Saw Dr Nuñez 8/22/22--he did echo 8/8/22--EF 60% mild to mod AR. He cont. lisinopril 5 mg daily. To see him at 1 yr.      Smoker  - Wellbutrin ER--incr to 300 mg daily. Pt trying to cut back and quit.  - Down to three quarters of a pack from 1 pack/day.     Abnormal CT of the chest  CT chest--12/30/22--2 cm consolidative airspace opacity in lingula suggestive of scar or atelectasis new from 2018 CT, emphysema, coronary artery calcifications.   Radiologist recom repeat CT in 6 mo. (copy to Dr Norman).  - CT CHEST most recently showed stable 4 mm nodule.  There was clearance of the lingular consolidation, moderate emphysema noted  - Consider repeat CT scan for later this year     COPD   - on past CT--hx of using flovent inhaler 44 mcg 2 p bid.  Alb inh prn.        Hx Numbness L hand L foot   -resolved- Oct 2019--Unclear etiology   MRI brain-11/4/19-3 x 2 mm pituitary nodule c/w incidental microadenoma. No CVA or tumor.  Very small size of pituitary microadenoma likely to be nonfunctioning.    11/22/19-CMP, lipid, prolactin-results ok. Was to see Dr. Pena.      Renal artery stenosis--s/p bilat renal artery stents 12/29/17  -Doing well.   Renal art doppler 12/6/19-patent right and left bilateral renal arterial stents.     Cardiomyopathy-resolved.  - cardiac cath 8/10/17-Coronaries nl. Mild global LV hypokinesis  -Cardiac work-up as above     Irritable bowel syndrome  -for yrs. Bentyl not helpful. Avoid lactose, try probiotic.    11/10/17--tissue TG IgA Ab--nl. Colonoscopy Dr Card 6/16/18-int hemorrhoids.      Hx atypical ductal hyperplasia R breast bx   11/2012 Dr. Bahena--she Rx evista 60 mg  daily-Per last visit with Dr. hZang, recom hold the Evista in view of hx radial artery thrombosis.     Trace edema  Bilateral lower extremities  - Conservative management with compression stockings  - Can consider Lasix in the future     Vitamin D deficiency  -vitamin D 2000 units daily. Vit D level 43.2, at goal     Healthcare maintenance:  Vaccines- tdap 7/25/17. Flu shot utd . Pneumovax 12/26/18 (emphysema on CT 9/2018). shingrix avail at pharmacy. Covid vaccine--declined..   Gyn Dr Gabe Du retired in 2021--now sees Dr Herrera..   Mammo-per gyn- 2/22/22 at [facility]--now is ord by Dr Herrera.   DEXA-12/7/22--osteopenia.  Colonoscopy Dr Card 6/16/18--int hemorrhoids.           Orders This Visit:  No orders of the defined types were placed in this encounter.      Meds This Visit:  Requested Prescriptions     Signed Prescriptions Disp Refills    albuterol 108 (90 Base) MCG/ACT Inhalation Aero Soln 3 each 3     Sig: Inhale 2 puffs into the lungs every 6 (six) hours as needed for Wheezing.       Imaging & Referrals:  None       Health Maintenance  HTN Screen: At goal  DM Screen: As above  HLD Screen: As above  Osteoporosis Screen (>65 or < 65 with FRAX > 9.3%): Up-to-date  HCV Screen: Considered low risk  HIV Screen: considered low risk  G/C/Syphilis: Considered low risk    Colon Cancer Screening (45-70): Last colonoscopy 2018,   Breast Cancer Screening (40-70): Up-to-date  Cervical Cancer Screening (21-64): Per OB/GYN  Lung Cancer Screening (55-79 with 30 p/year and active < 15 years): Not indicated    Influenza: Annually  Td/Tdap: Last Tdap 2022, due 2032  Zoster (50+): Recommended that patient check with insurance company for coverage, can obtain 2 doses at the pharmacy  HPV (19-26): Not indicated  Pneumococcal: Due for Prevnar 20    Immunization History   Administered Date(s) Administered    Duoneb Unit Dose, [facility]  04/02/2016    FLULAVAL 6 months & older 0.5 ml Prefilled syringe ([id number]) 11/10/2017,  12/26/2018, 10/25/2019, 10/06/2020, 11/09/2022    Influenza 12/18/2012    Pneumovax 23 12/26/2018    Prednisone  04/02/2016    TDAP 07/25/2017, 10/31/2022         Return to clinic in 4-5 months for follow-up    Armando Norman MD, 03/04/24, 9:16 PM

## 2024-03-04 ENCOUNTER — OFFICE VISIT (OUTPATIENT)
Dept: INTERNAL MEDICINE CLINIC | Facility: CLINIC | Age: 64
End: 2024-03-04

## 2024-03-04 VITALS
OXYGEN SATURATION: 96 % | WEIGHT: 136 LBS | DIASTOLIC BLOOD PRESSURE: 78 MMHG | SYSTOLIC BLOOD PRESSURE: 140 MMHG | BODY MASS INDEX: 21.86 KG/M2 | HEART RATE: 96 BPM | HEIGHT: 66 IN | TEMPERATURE: 99 F

## 2024-03-04 DIAGNOSIS — I35.1 AORTIC VALVE INSUFFICIENCY, ETIOLOGY OF CARDIAC VALVE DISEASE UNSPECIFIED: ICD-10-CM

## 2024-03-04 DIAGNOSIS — Z00.00 ANNUAL PHYSICAL EXAM: Primary | ICD-10-CM

## 2024-03-04 DIAGNOSIS — R93.89 ABNORMAL CT OF THE CHEST: ICD-10-CM

## 2024-03-04 DIAGNOSIS — E55.9 VITAMIN D DEFICIENCY: ICD-10-CM

## 2024-03-04 DIAGNOSIS — F41.9 ANXIETY AND DEPRESSION: ICD-10-CM

## 2024-03-04 DIAGNOSIS — J44.9 CHRONIC OBSTRUCTIVE PULMONARY DISEASE, UNSPECIFIED COPD TYPE (HCC): ICD-10-CM

## 2024-03-04 DIAGNOSIS — I10 HYPERTENSION, ESSENTIAL: ICD-10-CM

## 2024-03-04 DIAGNOSIS — E78.00 HYPERCHOLESTEROLEMIA: ICD-10-CM

## 2024-03-04 DIAGNOSIS — F32.A ANXIETY AND DEPRESSION: ICD-10-CM

## 2024-03-04 PROCEDURE — 99397 PER PM REEVAL EST PAT 65+ YR: CPT | Performed by: INTERNAL MEDICINE

## 2024-03-04 PROCEDURE — 3078F DIAST BP <80 MM HG: CPT | Performed by: INTERNAL MEDICINE

## 2024-03-04 PROCEDURE — 3008F BODY MASS INDEX DOCD: CPT | Performed by: INTERNAL MEDICINE

## 2024-03-04 PROCEDURE — 3077F SYST BP >= 140 MM HG: CPT | Performed by: INTERNAL MEDICINE

## 2024-03-04 RX ORDER — GINSENG 100 MG
CAPSULE ORAL
COMMUNITY

## 2024-03-04 RX ORDER — ALBUTEROL SULFATE 90 UG/1
2 AEROSOL, METERED RESPIRATORY (INHALATION) EVERY 6 HOURS PRN
Qty: 3 EACH | Refills: 3 | Status: SHIPPED | OUTPATIENT
Start: 2024-03-04

## 2024-03-04 RX ORDER — MULTIVIT WITH MINERALS/LUTEIN
1000 TABLET ORAL DAILY
COMMUNITY

## 2024-04-15 ENCOUNTER — TELEPHONE (OUTPATIENT)
Dept: INTERNAL MEDICINE CLINIC | Facility: CLINIC | Age: 64
End: 2024-04-15

## 2024-04-15 DIAGNOSIS — R13.10 DYSPHAGIA, UNSPECIFIED TYPE: Primary | ICD-10-CM

## 2024-04-15 NOTE — TELEPHONE ENCOUNTER
----- Message from Anita Bingham sent at 4/12/2024  5:11 PM CDT -----  Regarding: Referral - ENT Doctor  Contact: [phone]  Hello,    I have been having trouble swallowing for many weeks now.  It feels as though there is constantly something stuck in my throat.  I would like a referral for an ENT doctor if possible.  Thank you for your assistance.    Anita Bingham

## 2024-05-07 NOTE — TELEPHONE ENCOUNTER
Called patient who still has trouble swallowing . Serge HOOD and RN explained that she does not need referral to see Ears, Nose, Throat Specialist - she will call for vu.

## 2024-05-09 NOTE — TELEPHONE ENCOUNTER
Spoke to pt - recommended pt call to find out insurance coverage on formulary;   she is not out of med

## 2024-05-31 ENCOUNTER — PATIENT MESSAGE (OUTPATIENT)
Dept: INTERNAL MEDICINE CLINIC | Facility: CLINIC | Age: 64
End: 2024-05-31

## 2024-06-03 NOTE — TELEPHONE ENCOUNTER
From: Anita Bingham  To: Armando Norman  Sent: 5/31/2024 4:24 PM CDT  Subject: Albuteral Replacement    Hello,  [employer] will no longer cover my Albuteral prescription. I called [employer] today and they said the replacement they prefer is Pro-Air HSA Inhalation. Can Dr. Norman please write a prescription for this and send it to [facility] [place].    Thank you.

## 2024-06-07 RX ORDER — ALBUTEROL SULFATE 90 UG/1
2 AEROSOL, METERED RESPIRATORY (INHALATION) EVERY 6 HOURS PRN
Qty: 3 EACH | Refills: 3 | Status: SHIPPED | OUTPATIENT
Start: 2024-06-07

## 2024-07-09 ENCOUNTER — TELEPHONE (OUTPATIENT)
Dept: FAMILY MEDICINE CLINIC | Facility: CLINIC | Age: 64
End: 2024-07-09

## 2024-07-09 DIAGNOSIS — D72.829 LEUKOCYTOSIS, UNSPECIFIED TYPE: ICD-10-CM

## 2024-07-09 DIAGNOSIS — R73.01 ELEVATED FASTING BLOOD SUGAR: Primary | ICD-10-CM

## 2024-07-09 NOTE — TELEPHONE ENCOUNTER
C5-7 ACDF on 07/25/24 with Dr. Walter @ [facility]    H&P- completed 07/10/24  Labs- WBC (12.5)- no history of elevated WBC, Neutrophil absolute (11), Lymphocyte absolute (0.90), FBS (106), A1C (5.5), chloride (115), BUN/Creat ratio (27.2), Calc Osmo (300), MRSA neg, all other labs WNL  EKG- sinus rhythm with short NV, RBBB, when compared with EKG of 03/14/23 NV interval has decreased.  X-ray- WNL    Cardio- Dr. Nuñez  No Clx needed per JA

## 2024-07-10 ENCOUNTER — LAB ENCOUNTER (OUTPATIENT)
Dept: LAB | Facility: HOSPITAL | Age: 64
End: 2024-07-10
Attending: FAMILY MEDICINE
Payer: COMMERCIAL

## 2024-07-10 ENCOUNTER — HOSPITAL ENCOUNTER (OUTPATIENT)
Dept: GENERAL RADIOLOGY | Facility: HOSPITAL | Age: 64
Discharge: HOME | End: 2024-07-10
Attending: FAMILY MEDICINE
Payer: COMMERCIAL

## 2024-07-10 ENCOUNTER — OFFICE VISIT (OUTPATIENT)
Dept: FAMILY MEDICINE CLINIC | Facility: CLINIC | Age: 64
End: 2024-07-10
Payer: COMMERCIAL

## 2024-07-10 VITALS
SYSTOLIC BLOOD PRESSURE: 120 MMHG | TEMPERATURE: 96 F | HEIGHT: 65 IN | WEIGHT: 134 LBS | DIASTOLIC BLOOD PRESSURE: 75 MMHG | HEART RATE: 78 BPM | OXYGEN SATURATION: 97 % | BODY MASS INDEX: 22.33 KG/M2 | RESPIRATION RATE: 16 BRPM

## 2024-07-10 DIAGNOSIS — J44.9 CHRONIC OBSTRUCTIVE PULMONARY DISEASE, UNSPECIFIED COPD TYPE (HCC): ICD-10-CM

## 2024-07-10 DIAGNOSIS — Z01.818 PREOPERATIVE EXAMINATION, UNSPECIFIED: ICD-10-CM

## 2024-07-10 DIAGNOSIS — M48.02 CERVICAL SPINAL STENOSIS: ICD-10-CM

## 2024-07-10 DIAGNOSIS — Z01.812 PRE-OPERATIVE LABORATORY EXAMINATION: ICD-10-CM

## 2024-07-10 DIAGNOSIS — I70.90 ASVD (ARTERIOSCLEROTIC VASCULAR DISEASE): ICD-10-CM

## 2024-07-10 DIAGNOSIS — I10 HYPERTENSION, ESSENTIAL: ICD-10-CM

## 2024-07-10 DIAGNOSIS — Z01.818 PREOPERATIVE EXAMINATION, UNSPECIFIED: Primary | ICD-10-CM

## 2024-07-10 DIAGNOSIS — Z98.890 HISTORY OF STENT INSERTION OF RENAL ARTERY: ICD-10-CM

## 2024-07-10 DIAGNOSIS — E78.00 HYPERCHOLESTEROLEMIA: ICD-10-CM

## 2024-07-10 DIAGNOSIS — R73.01 ELEVATED FASTING BLOOD SUGAR: ICD-10-CM

## 2024-07-10 DIAGNOSIS — F17.200 SMOKER: ICD-10-CM

## 2024-07-10 LAB
ALBUMIN SERPL-MCNC: 4.6 G/DL (ref 3.2–4.8)
ALBUMIN/GLOB SERPL: 2 {RATIO} (ref 1–2)
ALP LIVER SERPL-CCNC: 61 U/L
ALT SERPL-CCNC: 17 U/L
ANION GAP SERPL CALC-SCNC: 5 MMOL/L (ref 0–18)
ANTIBODY SCREEN: NEGATIVE
APTT PPP: 27.6 SECONDS (ref 23–36)
AST SERPL-CCNC: 15 U/L (ref ?–34)
ATRIAL RATE: 68 BPM
BASOPHILS # BLD AUTO: 0.03 X10(3) UL (ref 0–0.2)
BASOPHILS NFR BLD AUTO: 0.2 %
BILIRUB SERPL-MCNC: 0.5 MG/DL (ref 0.2–1.1)
BILIRUB UR QL: NEGATIVE
BUN BLD-MCNC: 22 MG/DL (ref 9–23)
BUN/CREAT SERPL: 27.2 (ref 10–20)
CALCIUM BLD-MCNC: 9.6 MG/DL (ref 8.7–10.4)
CHLORIDE SERPL-SCNC: 115 MMOL/L (ref 98–112)
CLARITY UR: CLEAR
CO2 SERPL-SCNC: 23 MMOL/L (ref 21–32)
COLOR UR: COLORLESS
CREAT BLD-MCNC: 0.81 MG/DL
DEPRECATED RDW RBC AUTO: 43.1 FL (ref 35.1–46.3)
EGFRCR SERPLBLD CKD-EPI 2021: 80 ML/MIN/1.73M2 (ref 60–?)
EOSINOPHIL # BLD AUTO: 0 X10(3) UL (ref 0–0.7)
EOSINOPHIL NFR BLD AUTO: 0 %
ERYTHROCYTE [DISTWIDTH] IN BLOOD BY AUTOMATED COUNT: 13.1 % (ref 11–15)
EST. AVERAGE GLUCOSE BLD GHB EST-MCNC: 111 MG/DL (ref 68–126)
FASTING STATUS PATIENT QL REPORTED: NO
GLOBULIN PLAS-MCNC: 2.3 G/DL (ref 2–3.5)
GLUCOSE BLD-MCNC: 106 MG/DL (ref 70–99)
GLUCOSE UR-MCNC: NORMAL MG/DL
HBA1C MFR BLD: 5.5 % (ref ?–5.7)
HCT VFR BLD AUTO: 38 %
HGB BLD-MCNC: 13 G/DL
HGB UR QL STRIP.AUTO: NEGATIVE
IMM GRANULOCYTES # BLD AUTO: 0.14 X10(3) UL (ref 0–1)
IMM GRANULOCYTES NFR BLD: 1.1 %
INR BLD: 0.96 (ref 0.8–1.2)
KETONES UR-MCNC: NEGATIVE MG/DL
LEUKOCYTE ESTERASE UR QL STRIP.AUTO: NEGATIVE
LYMPHOCYTES # BLD AUTO: 0.9 X10(3) UL (ref 1–4)
LYMPHOCYTES NFR BLD AUTO: 7.2 %
MCH RBC QN AUTO: 31 PG (ref 26–34)
MCHC RBC AUTO-ENTMCNC: 34.2 G/DL (ref 31–37)
MCV RBC AUTO: 90.5 FL
MONOCYTES # BLD AUTO: 0.38 X10(3) UL (ref 0.1–1)
MONOCYTES NFR BLD AUTO: 3.1 %
NEUTROPHILS # BLD AUTO: 11 X10 (3) UL (ref 1.5–7.7)
NEUTROPHILS # BLD AUTO: 11 X10(3) UL (ref 1.5–7.7)
NEUTROPHILS NFR BLD AUTO: 88.4 %
NITRITE UR QL STRIP.AUTO: NEGATIVE
OSMOLALITY SERPL CALC.SUM OF ELEC: 300 MOSM/KG (ref 275–295)
P AXIS: 56 DEGREES
P-R INTERVAL: 102 MS
PH UR: 5.5 [PH] (ref 5–8)
PLATELET # BLD AUTO: 211 10(3)UL (ref 150–450)
POTASSIUM SERPL-SCNC: 3.7 MMOL/L (ref 3.5–5.1)
PROT SERPL-MCNC: 6.9 G/DL (ref 5.7–8.2)
PROT UR-MCNC: NEGATIVE MG/DL
PROTHROMBIN TIME: 13.4 SECONDS (ref 11.6–14.8)
Q-T INTERVAL: 432 MS
QRS DURATION: 136 MS
QTC CALCULATION (BEZET): 459 MS
R AXIS: -2 DEGREES
RBC # BLD AUTO: 4.2 X10(6)UL
RH BLOOD TYPE: POSITIVE
SODIUM SERPL-SCNC: 143 MMOL/L (ref 136–145)
SP GR UR STRIP: 1.01 (ref 1–1.03)
T AXIS: 24 DEGREES
UROBILINOGEN UR STRIP-ACNC: NORMAL
VENTRICULAR RATE: 68 BPM
WBC # BLD AUTO: 12.5 X10(3) UL (ref 4–11)

## 2024-07-10 PROCEDURE — 71046 X-RAY EXAM CHEST 2 VIEWS: CPT | Performed by: FAMILY MEDICINE

## 2024-07-10 PROCEDURE — 85025 COMPLETE CBC W/AUTO DIFF WBC: CPT | Performed by: FAMILY MEDICINE

## 2024-07-10 PROCEDURE — 99203 OFFICE O/P NEW LOW 30 MIN: CPT | Performed by: FAMILY MEDICINE

## 2024-07-10 PROCEDURE — 93010 ELECTROCARDIOGRAM REPORT: CPT | Performed by: INTERNAL MEDICINE

## 2024-07-10 PROCEDURE — 80053 COMPREHEN METABOLIC PANEL: CPT | Performed by: FAMILY MEDICINE

## 2024-07-10 PROCEDURE — 83036 HEMOGLOBIN GLYCOSYLATED A1C: CPT | Performed by: FAMILY MEDICINE

## 2024-07-10 PROCEDURE — 3008F BODY MASS INDEX DOCD: CPT | Performed by: FAMILY MEDICINE

## 2024-07-10 PROCEDURE — 3078F DIAST BP <80 MM HG: CPT | Performed by: FAMILY MEDICINE

## 2024-07-10 PROCEDURE — 86900 BLOOD TYPING SEROLOGIC ABO: CPT | Performed by: FAMILY MEDICINE

## 2024-07-10 PROCEDURE — 86850 RBC ANTIBODY SCREEN: CPT | Performed by: FAMILY MEDICINE

## 2024-07-10 PROCEDURE — 93005 ELECTROCARDIOGRAM TRACING: CPT

## 2024-07-10 PROCEDURE — 87081 CULTURE SCREEN ONLY: CPT | Performed by: FAMILY MEDICINE

## 2024-07-10 PROCEDURE — 86901 BLOOD TYPING SEROLOGIC RH(D): CPT | Performed by: FAMILY MEDICINE

## 2024-07-10 PROCEDURE — 3074F SYST BP LT 130 MM HG: CPT | Performed by: FAMILY MEDICINE

## 2024-07-10 PROCEDURE — 85610 PROTHROMBIN TIME: CPT | Performed by: FAMILY MEDICINE

## 2024-07-10 PROCEDURE — 81003 URINALYSIS AUTO W/O SCOPE: CPT | Performed by: FAMILY MEDICINE

## 2024-07-10 PROCEDURE — 85730 THROMBOPLASTIN TIME PARTIAL: CPT | Performed by: FAMILY MEDICINE

## 2024-07-10 RX ORDER — ROSUVASTATIN CALCIUM 5 MG/1
5 TABLET, COATED ORAL NIGHTLY
COMMUNITY

## 2024-07-10 NOTE — TELEPHONE ENCOUNTER
Dr. Morrison, please review:    Labs- WBC (12.5)- no history of elevated WBC, Neutrophil absolute (11), Lymphocyte absolute (0.90), FBS (106), A1C (5.5), chloride (115), BUN/Creat ratio (27.2), Calc Osmo (300), MRSA neg, all other labs WNL    EKG- sinus rhythm with short CA, RBBB, when compared with EKG of 03/14/23 CA interval has decreased.    X-ray- WNL    OK for surgery?

## 2024-07-11 PROBLEM — I70.90 ASVD (ARTERIOSCLEROTIC VASCULAR DISEASE): Status: ACTIVE | Noted: 2024-07-11

## 2024-07-11 PROBLEM — M48.02 CERVICAL SPINAL STENOSIS: Status: ACTIVE | Noted: 2024-07-11

## 2024-07-12 NOTE — TELEPHONE ENCOUNTER
Spoke to patient, went over all test results. Patient is still having a productive cough, slightly better than when she saw us in pre-op clinic. Since chest xray was normal and WBC was quinn Dr. Morrison would like her to follow up with PCP and have CBC redone next Wed 07/17. Order entered in Epic.

## 2024-07-12 NOTE — H&P
HPI:                                                                                                                                           PRE-OP NOTE  HISTORY AND PHYSICAL                                                                                                                                                                                                                                         I have been consulted by Dr. Walter to see Anita Bingham 64 year old female for a preoperative evaluation and medical clearance. Anita has a long history of worsening severe degenerative cervical spinal stenosis. Pt to have C5-7  ACDF by Dr. Walter on 24 at [facility].     Pt suffers significant pain and loss of function.     No cardiac symptoms.     No history of LYN or DVT. She does smoke cigarettes over many years.      Family History   Problem Relation Age of Onset    Hypertension Father     Pulmonary Disease Father         smoker    Lipids Father     Arthritis Father     Dementia Father     Heart Disease Father          age 88    Other (cancer bladder) Father 77    Hypertension Mother     Diabetes Mother     Dementia Mother 90    Other ( age 94 after hip fracture) Mother     Hypertension Other     Other (Other) Brother         L & W    Other (Other) Brother         L & W    Other (5 daughters) Daughter     Other (2 sons) Son         1 son with Aspergers    Other (ASD) Daughter 36        robotic repair    Other (cancer thyroid) Daughter 40      Current Outpatient Medications   Medication Sig Dispense Refill    rosuvastatin 5 MG Oral Tab Take 1 tablet (5 mg total) by mouth nightly.      albuterol (PROAIR HFA) 108 (90 Base) MCG/ACT Inhalation Aero Soln Inhale 2 puffs into the lungs every 6 (six) hours as needed for Wheezing. 3 each 3    MAGNESIUM GLYCINATE OR Take 240 mg by mouth in the morning and 240 mg before bedtime. Take two daily .      Ascorbic Acid (VITAMIN C) 1000 MG Oral Tab Take 1 tablet (1,000  mg total) by mouth daily.      Zinc 50 MG Oral Tab Take by mouth.      lisinopril 5 MG Oral Tab Take 1 tablet (5 mg total) by mouth daily. (Patient taking differently: Take 1 tablet (5 mg total) by mouth nightly.) 90 tablet 3    metoprolol succinate ER 25 MG Oral Tablet 24 Hr Take 0.5 tablets (12.5 mg total) by mouth daily. (Patient taking differently: Take 0.5 tablets (12.5 mg total) by mouth nightly.) 45 tablet 3    Vitamin D, Cholecalciferol, 50 MCG (2000 UT) Oral Cap Take by mouth. 30 capsule 0     Past Medical History:    Atypical ductal hyperplasia of right breast    Needle localization R breast biopsy Dr Bahena at [facility]. path-atypical ductal hyperplasia, flat epithelial atypia, atypical lobular hyperplasia.  Rx [place] per Dr. Bahena    Back problem    neck and shoulder    Bilateral renal artery stenosis (HCC)    Bilateral renal artery stents-12/29/17-Dr. Wong. Left renal subcapsular hematoma post procedure. BETTE related to atherosclerosis.     Cervical arthritis    MRI C-spine-11/4/19-- disc degeneration, DJD, multiple perineural nerve root sleeve cysts.    COVID-19    Outpatient    Dilated cardiomyopathy (HCC)    cardiac cath Dr Nuñez 8/10/17 -Coronary arteries normal. Mild global LV hypokinesis. Estimated EF 40-45%    DJD (degenerative joint disease) of cervical spine    MRI cervical spine 11/4/19-degenerative disc disease, DJD, multiple perineural nerve root sleeve cysts    Essential hypertension    H/O colonoscopy    Colonoscopy Dr. Card 6/26/08-hyperplastic polyp and internal hemorrhoids.    Hematoma of left kidney    left kidney subcapsular hematoma--complication of renal artery stenting procedure    High blood pressure    High cholesterol    IBS    Lipid screening    Per [employer]    Muscle weakness    arms due to neck bulging disc    Musculoskeletal disorder    left foot bunion (surgery)    Numbness and tingling of left arm and leg    MRI brain-11/4/19-3 x 2 mm pituitary nodule c/w incidental  microadenoma. no CVA or tumor    Osteopenia    DEXA per GYN at office    Pituitary microadenoma (HCC)    MRI brain 11/4/19--3 x 2 mm incidental pituitary microadenoma    PVC's (premature ventricular contractions)    Frequent PVCs on EKG 7/25/17    Radial artery thrombosis, right (HCC)    Right radial artery thrombosis post cardiac cath 8/2017.  Heparin-then Eliquis until 12/2017.    Renal disorder    kidney blockage stent placement    Vitamin D deficiency     Past Surgical History:   Procedure Laterality Date    Breast biopsy Right 11/28/2012    R needle loc breast bx Dr Bahena at [facility]    Carpal tunnel release Right 02/2021    Dr Russell    Cath renal stent Bilateral 12/29/2017    Bilateral renal artery stents Dr. Wong at [facility].    Colonoscopy  06/28/2008    Colonoscopy Dr. Card-hyperplastic polyp and internal hemorrhoids.    D & c  1998 & 1999    missed ab    Elbow surgery Right 12/23/2020    Arthroscopy R elbow with tendon repair --Dr Getachew Russell at [place].     Foot surgery Left 2003     L foot bunion surgery x2    Kidney surgery       Social History     Socioeconomic History    Marital status:      Spouse name: Not on file    Number of children: Not on file    Years of education: Not on file    Highest education level: Not on file   Occupational History    Occupation:    Tobacco Use    Smoking status: Every Day     Current packs/day: 0.75     Average packs/day: 0.7 packs/day for 46.5 years (34.9 ttl pk-yrs)     Types: Cigarettes     Start date: 1/1/1978     Passive exposure: Never    Smokeless tobacco: Never   Vaping Use    Vaping status: Never Used   Substance and Sexual Activity    Alcohol use: Yes     Comment: occasional    Drug use: No    Sexual activity: Yes     Partners: Male     Comment: postmenopausal 11/18/19 current   Other Topics Concern     Service Not Asked    Blood Transfusions Not Asked    Caffeine Concern Yes     Comment: Coffee, 2-3 cups  daily.    Occupational Exposure Not Asked    Hobby Hazards Not Asked    Sleep Concern Not Asked    Stress Concern Not Asked    Weight Concern Not Asked    Special Diet Not Asked    Back Care Not Asked    Exercise Yes     Comment: few times weekly    Bike Helmet Not Asked    Seat Belt Not Asked    Self-Exams Not Asked   Social History Narrative    The patient does not use an assistive device..      The patient does live in a home with stairs.     Social Determinants of Health     Financial Resource Strain: Not on file   Food Insecurity: Not on file   Transportation Needs: Not on file   Physical Activity: Not on file   Stress: Not on file   Social Connections: Not on file   Housing Stability: At Risk (8/18/2023)    [facility] [employer]    [facility]     Living Situation: Not on file     Housing Problems: Not on file       REVIEW OF SYSTEMS:   CONSTITUTIONAL:  Denies unusual weight gain/loss, fever, chills  EENT:  Eyes:  Denies eye pain, visual loss, blurred vision, double vision. Ears, Nose, Throat:  Denies congestion, runny nose or sore throat.  INTEGUMENTARY:  Denies rashes, itching, skin lesion,   CARDIOVASCULAR:  Denies DVT. Denies chest pain, palpitations, edema, dyspnea  RESPIRATORY:  LYN, she does note cough for few weeks. No chest pain. No other complaints  GASTROINTESTINAL:  Denies abdominal pain, nausea, vomiting, constipation, diarrhea, or blood in stool.  MUSCULOSKELETAL:  severe pain as noted above  NEUROLOGICAL:  Denies headache, seizures, dizziness, syncope, paralysis, ataxia,  HEMATOLOGIC:  Denies anemia, bleeding or bruising.  LYMPHATICS:  Denies enlarged nodes   PSYCHIATRIC:  Denies depression or anxiety.  ENDOCRINOLOGIC: DM 2   ALLERGIES:  Denies allergic response, history of asthma, hives,     EXAM:   /75 (BP Location: Left arm)   Pulse 78   Temp (!) 96 °F (35.6 °C) (Temporal)   Resp 16   Ht 5' 5\" (1.651 m)   Wt 134 lb (60.8 kg)   LMP 12/31/2008 (Exact Date)   SpO2  97%   BMI 22.30 kg/m²  Estimated body mass index is 22.3 kg/m² as calculated from the following:    Height as of this encounter: 5' 5\" (1.651 m).    Weight as of this encounter: 134 lb (60.8 kg).   Vital signs reviewed.Appears stated age, well groomed.  Physical Exam:  GEN:  Patient is alert, awake and oriented, well developed, well nourished, no apparent distress.  HEENT:  Head:  Normocephalic, atraumatic Eyes: EOMI, no scleral icterus, conjunctivae clear bilaterally, no eye discharge Nose: patent, no nasal discharge   NECK: Supple, no CLAD, no carotid bruit, no thyromegaly.  SKIN: No rashes, no skin lesion, no bruising, good turgor.  HEART:  Regular rate  LUNGS: Clear. When asked to cough she has a slight wheeze and slight congestion that clears with cough.   CHEST: No tenderness.  ABDOMEN:  Soft, nondistended, nontender,  no masses, no hepatosplenomegaly.  BACK: No tenderness, no spasm,   EXTREMITIES:  No edema, no cyanosis, no clubbing,   NEURO:  No focal deficit, speech fluent, normal gait, strength and tone, sensory intact      Lab Results   Component Value Date    WBC 12.5 (H) 07/10/2024    HGB 13.0 07/10/2024    HCT 38.0 07/10/2024    .0 07/10/2024    CREATSERUM 0.81 07/10/2024    BUN 22 07/10/2024     07/10/2024    K 3.7 07/10/2024     (H) 07/10/2024    CO2 23.0 07/10/2024     (H) 07/10/2024    CA 9.6 07/10/2024    ALB 4.6 07/10/2024    ALKPHO 61 07/10/2024    BILT 0.5 07/10/2024    TP 6.9 07/10/2024    AST 15 07/10/2024    ALT 17 07/10/2024    PTT 27.6 07/10/2024    INR 0.96 07/10/2024    TSH 1.267 12/22/2023     05/20/2019       XR CHEST PA + LAT CHEST (CPT=71046)    Result Date: 7/10/2024  CONCLUSION: No acute cardiopulmonary disease.    Dictated by (CST): Arsen Thomson MD on 7/10/2024 at 11:43 AM     Finalized by (CST): Arsen Thomson MD on 7/10/2024 at 11:44 AM           EKG 12 Lead    Result Date: 7/10/2024  Sinus rhythm with short MA Right bundle branch block  Abnormal ECG When compared with ECG of 14-MAR-2023 18:07, MA interval has decreased Confirmed by Mark Krause ([id number]) on 7/10/2024 4:24:21 PM     ASSESSMENT AND PLAN:   Anita Bingham is a 64 year old female, with a hx of severe degenerative cervical spinal stenosis. Pt to have C5-7  ACDF by Dr. Walter on 7/25/24 at [facility].        3. Cervical spinal stenosis  M48.02       4. ASVD (arteriosclerotic vascular disease)  I70.90       5. History of stent insertion of renal artery  Z98.890       6. Smoker  F17.200       7. Chronic obstructive pulmonary disease, unspecified COPD type (HCC)  J44.9  Slight cough. Normal chest xray. Pt to call if change with cough      8. Hypertension, essential  I10       9. Hypercholesterolemia  E78.00        ECG and labs have been reviewed and are in acceptable range for surgery.     Preoperative Risk Stratification: There are no decompensated medical conditions. ASA classification 2    Patient is low risk for major cardiac event in the perioperative period.     Patient is medically optimized and has an acceptable risk of surgery and may proceed with surgery as planned.     PLAN:    Patient to discontinue medications and supplements with anticoagulation properties as per instruction.       Postoperative Recommendations:    Anticoagulation / DVT prophylaxis: SCDs, as per Dr. Walter. Early ambulation   GI protection: Protonix  Incentive Spirometry   Telemetry as needed  CPAP/O2 as needed   DM: QID glucoscans and sliding scale insulin as needed   Renal protection: (hydration / NSAID and ACE/ARB avoidance)   Cognitive protection: (minimize narcotics, benzodiazepines, scopolamine)     Pain management and Physical therapy as per Orthopedic service.   Home Health as needed    Thank you for the opportunity to care for your patient and to assist in managing the postoperative course.        Jose R Morrison MD  7/11/2024  8:24 PM

## 2024-07-15 ENCOUNTER — OFFICE VISIT (OUTPATIENT)
Dept: INTERNAL MEDICINE CLINIC | Facility: CLINIC | Age: 64
End: 2024-07-15

## 2024-07-15 VITALS
WEIGHT: 138.63 LBS | HEART RATE: 89 BPM | HEIGHT: 65 IN | TEMPERATURE: 99 F | SYSTOLIC BLOOD PRESSURE: 102 MMHG | OXYGEN SATURATION: 95 % | BODY MASS INDEX: 23.1 KG/M2 | DIASTOLIC BLOOD PRESSURE: 62 MMHG

## 2024-07-15 DIAGNOSIS — R05.9 COUGH, UNSPECIFIED TYPE: Primary | ICD-10-CM

## 2024-07-15 PROCEDURE — 3078F DIAST BP <80 MM HG: CPT | Performed by: INTERNAL MEDICINE

## 2024-07-15 PROCEDURE — 3074F SYST BP LT 130 MM HG: CPT | Performed by: INTERNAL MEDICINE

## 2024-07-15 PROCEDURE — 99214 OFFICE O/P EST MOD 30 MIN: CPT | Performed by: INTERNAL MEDICINE

## 2024-07-15 PROCEDURE — 3008F BODY MASS INDEX DOCD: CPT | Performed by: INTERNAL MEDICINE

## 2024-07-15 RX ORDER — METOPROLOL SUCCINATE 25 MG/1
12.5 TABLET, EXTENDED RELEASE ORAL DAILY
COMMUNITY

## 2024-07-15 NOTE — PROGRESS NOTES
Anita Bingham is a 64 year old female.  Chief Complaint   Patient presents with    Cough     Symptoms onset: 2 weeks.   Cough has improved.  Reports shortness of breath w/exertion  No wheezing.   No fever/chills.   Covid Friday     HPI:   Anita Bingham is a 64 year old female who presents for a cough.    Patient had pre-op visit last week on 7/10 and had a cough at the time. A CXR was negative. Pre-op labs showed mildly elevated WBC count. Rn at pre-op office recommended she f/u with PCP. She COVID tested at home, negative, denies URI sx. Since, the patient's cough has almost completely resolved. The pre-op physician ordered a repeat CBC the patient plans to obtain in 2 days, a week from the initial.    Wt Readings from Last 6 Encounters:   07/15/24 138 lb 9.6 oz (62.9 kg)   07/10/24 134 lb (60.8 kg)   03/04/24 136 lb (61.7 kg)   07/17/23 135 lb 6 oz (61.4 kg)   03/14/23 140 lb (63.5 kg)   11/09/22 134 lb (60.8 kg)     Body mass index is 23.06 kg/m².     Current Outpatient Medications   Medication Sig Dispense Refill    metoprolol succinate ER 25 MG Oral Tablet 24 Hr Take 0.5 tablets (12.5 mg total) by mouth daily.      rosuvastatin 5 MG Oral Tab Take 1 tablet (5 mg total) by mouth nightly.      albuterol (PROAIR HFA) 108 (90 Base) MCG/ACT Inhalation Aero Soln Inhale 2 puffs into the lungs every 6 (six) hours as needed for Wheezing. 3 each 3    MAGNESIUM GLYCINATE OR Take 480 mg by mouth daily. Take two daily      Ascorbic Acid (VITAMIN C) 1000 MG Oral Tab Take 1 tablet (1,000 mg total) by mouth daily.      Zinc 50 MG Oral Tab Take 1 tablet by mouth daily.      lisinopril 5 MG Oral Tab Take 1 tablet (5 mg total) by mouth daily. (Patient taking differently: Take 1 tablet (5 mg total) by mouth nightly.) 90 tablet 3    Vitamin D, Cholecalciferol, 50 MCG (2000 UT) Oral Cap Take 1 tablet by mouth daily. 30 capsule 0      Past Medical History:    Atypical ductal hyperplasia of right breast    Needle localization R breast  [FreeTextEntry1] : Pulm Dr Flores\par Adrianna Temple biopsy Dr Bahena at [facility]. path-atypical ductal hyperplasia, flat epithelial atypia, atypical lobular hyperplasia.  Rx E [place] per Dr. Bahena    Back problem    neck and shoulder    Bilateral renal artery stenosis (HCC)    Bilateral renal artery stents-12/29/17-Dr. Wong. Left renal subcapsular hematoma post procedure. BETTE related to atherosclerosis.     Cervical arthritis    MRI C-spine-11/4/19-- disc degeneration, DJD, multiple perineural nerve root sleeve cysts.    COVID-19    Outpatient    Dilated cardiomyopathy (HCC)    cardiac cath Dr Nuñez 8/10/17 -Coronary arteries normal. Mild global LV hypokinesis. Estimated EF 40-45%    DJD (degenerative joint disease) of cervical spine    MRI cervical spine 11/4/19-degenerative disc disease, DJD, multiple perineural nerve root sleeve cysts    Essential hypertension    H/O colonoscopy    Colonoscopy Dr. Card 6/26/08-hyperplastic polyp and internal hemorrhoids.    Hematoma of left kidney    left kidney subcapsular hematoma--complication of renal artery stenting procedure    High blood pressure    High cholesterol    IBS    Lipid screening    Per [employer]    Muscle weakness    arms due to neck bulging disc    Musculoskeletal disorder    left foot bunion (surgery)    Numbness and tingling of left arm and leg    MRI brain-11/4/19-3 x 2 mm pituitary nodule c/w incidental microadenoma. no CVA or tumor    Osteopenia    DEXA per GYN at office    Pit[facility]adenoma ([facility])    MRI brain 11/4/19--3 x 2 mm incidental pituitary microadenoma    PVC's (premature ventricular contractions)    Frequent PVCs on EKG 7/25/17    Radial artery thrombosis, right (HCC)    Right radial artery thrombosis post cardiac cath 8/2017.  Heparin-then Eliquis until 12/2017.    Renal disorder    kidney blockage stent placement    Vitamin D deficiency      Past Surgical History:   Procedure Laterality Date    Breast biopsy Right 11/28/2012    R needle loc breast bx Dr Bahena at [facility]     Carpal tunnel release Right 2021    Dr Russell    Cath renal stent Bilateral 2017    Bilateral renal artery stents Dr. Wong at [facility].    Colonoscopy  2008    Colonoscopy Dr. Card-hyperplastic polyp and internal hemorrhoids.    D & c   &     missed ab    Elbow surgery Right 2020    Arthroscopy R elbow with tendon repair --Dr Getachew Russell at [place].     Foot surgery Left      L foot bunion surgery x2    Kidney surgery            Other surgical history  12, 17, 2020      Family History   Problem Relation Age of Onset    Hypertension Father     Pulmonary Disease Father         smoker    Lipids Father     Arthritis Father     Dementia Father     Heart Disease Father          age 88    Other (cancer bladder) Father 77    Hypertension Mother     Diabetes Mother     Dementia Mother 90    Other ( age 94 after hip fracture) Mother     Hypertension Other     Other (Other) Brother         L & W    Other (Other) Brother         L & W    Other (5 daughters) Daughter     Other (2 sons) Son         1 son with Aspergers    Other (ASD) Daughter 36        robotic repair    Other (cancer thyroid) Daughter 40      Social History:   Social History     Socioeconomic History    Marital status:    Occupational History    Occupation:    Tobacco Use    Smoking status: Every Day     Current packs/day: 0.75     Average packs/day: 0.7 packs/day for 46.5 years (34.9 ttl pk-yrs)     Types: Cigarettes     Start date: 1978     Passive exposure: Never    Smokeless tobacco: Never    Tobacco comments:     N/A   Vaping Use    Vaping status: Never Used   Substance and Sexual Activity    Alcohol use: Yes     Alcohol/week: 1.0 standard drink of alcohol     Types: 1 Glasses of wine per week     Comment: 1 glass wine per week    Drug use: No    Sexual activity: Yes     Partners: Male     Comment: postmenopausal 17 current   Other Topics Concern     Caffeine Concern Yes     Comment: Coffee, 2-3 cups daily.    Exercise Yes     Comment: few times weekly   Social History Narrative    The patient does not use an assistive device..      The patient does live in a home with stairs.     Social Determinants of Health      [facility]          REVIEW OF SYSTEMS:   GENERAL: feels well otherwise, denies f/c  HEENT: denies nasal congestion, sinus pain, sore throat  LUNGS: denies shortness of breath with exertion, cough   CV: denies CP    EXAM:   /62 (BP Location: Right arm, Patient Position: Sitting, Cuff Size: adult)   Pulse 89   Temp 98.5 °F (36.9 °C) (Oral)   Ht 5' 5\" (1.651 m)   Wt 138 lb 9.6 oz (62.9 kg)   LMP 12/31/2008 (Exact Date)   SpO2 95%   Breastfeeding No   BMI 23.06 kg/m²     GENERAL: well developed, well nourished, in no apparent distress  HEENT: normal oropharynx, normal TM's b/l  EYES: PERRLA, EOMI, conjunctivae are pink  NECK: supple, no cervical or supraclavicular LAD  LUNGS: clear to auscultation b/l, no w/r/r  CARDIO: RRR, normal S1S2, no m/r/g   NEURO: A&O x 3, moves all 4 extremities spontaneously      ASSESSMENT AND PLAN:   Anita Bingham is a 64 year old female who presents for a cough.    Cough, resolved  - possibly viral URI now resolved, CXR negative for pna  - if recurs can consider further imaging with CT chest  - if recurs and persists, consider trial off ACEi    Leukocytosis  - CXR, UA negative, no other focal sx, f/c  - has repeat CBC ordered, plans to obtain 7/17    RTC as previously scheduled with PCP or sooner PRN.    For E/M code - 30 minutes spent reviewing performing chart review, obtaining a history, performing a physical exam, reviewing the assessment/plan, placing orders, and completing documentation.     Regina Thompson DO  7/15/2024  3:52 PM

## 2024-07-17 ENCOUNTER — LAB ENCOUNTER (OUTPATIENT)
Dept: LAB | Facility: HOSPITAL | Age: 64
End: 2024-07-17
Attending: FAMILY MEDICINE
Payer: COMMERCIAL

## 2024-07-17 DIAGNOSIS — D72.829 LEUKOCYTOSIS, UNSPECIFIED TYPE: ICD-10-CM

## 2024-07-17 LAB
BASOPHILS # BLD AUTO: 0.04 X10(3) UL (ref 0–0.2)
BASOPHILS NFR BLD AUTO: 0.4 %
DEPRECATED RDW RBC AUTO: 43.8 FL (ref 35.1–46.3)
EOSINOPHIL # BLD AUTO: 0.29 X10(3) UL (ref 0–0.7)
EOSINOPHIL NFR BLD AUTO: 2.8 %
ERYTHROCYTE [DISTWIDTH] IN BLOOD BY AUTOMATED COUNT: 12.8 % (ref 11–15)
HCT VFR BLD AUTO: 38.6 %
HGB BLD-MCNC: 12.9 G/DL
IMM GRANULOCYTES # BLD AUTO: 0.1 X10(3) UL (ref 0–1)
IMM GRANULOCYTES NFR BLD: 1 %
LYMPHOCYTES # BLD AUTO: 2.09 X10(3) UL (ref 1–4)
LYMPHOCYTES NFR BLD AUTO: 20 %
MCH RBC QN AUTO: 31 PG (ref 26–34)
MCHC RBC AUTO-ENTMCNC: 33.4 G/DL (ref 31–37)
MCV RBC AUTO: 92.8 FL
MONOCYTES # BLD AUTO: 0.8 X10(3) UL (ref 0.1–1)
MONOCYTES NFR BLD AUTO: 7.6 %
NEUTROPHILS # BLD AUTO: 7.15 X10 (3) UL (ref 1.5–7.7)
NEUTROPHILS # BLD AUTO: 7.15 X10(3) UL (ref 1.5–7.7)
NEUTROPHILS NFR BLD AUTO: 68.2 %
PLATELET # BLD AUTO: 215 10(3)UL (ref 150–450)
RBC # BLD AUTO: 4.16 X10(6)UL
WBC # BLD AUTO: 10.5 X10(3) UL (ref 4–11)

## 2024-07-17 PROCEDURE — 85025 COMPLETE CBC W/AUTO DIFF WBC: CPT | Performed by: FAMILY MEDICINE

## 2024-07-18 NOTE — TELEPHONE ENCOUNTER
Spoke to patient, she is clear for surgery per Dr. Morrison. She saw her PCP Monday who listened to her lungs again and said everything sounded clear.

## 2024-07-25 ENCOUNTER — HOSPITAL ENCOUNTER (OUTPATIENT)
Facility: HOSPITAL | Age: 64
Discharge: HOME | End: 2024-07-26
Attending: ORTHOPAEDIC SURGERY | Admitting: ORTHOPAEDIC SURGERY
Payer: COMMERCIAL

## 2024-07-25 ENCOUNTER — APPOINTMENT (OUTPATIENT)
Dept: GENERAL RADIOLOGY | Facility: HOSPITAL | Age: 64
End: 2024-07-25
Attending: ORTHOPAEDIC SURGERY
Payer: COMMERCIAL

## 2024-07-25 ENCOUNTER — ANESTHESIA (OUTPATIENT)
Dept: SURGERY | Facility: HOSPITAL | Age: 64
End: 2024-07-25
Payer: COMMERCIAL

## 2024-07-25 ENCOUNTER — ANESTHESIA EVENT (OUTPATIENT)
Dept: SURGERY | Facility: HOSPITAL | Age: 64
End: 2024-07-25
Payer: COMMERCIAL

## 2024-07-25 DIAGNOSIS — M48.02 CERVICAL SPINAL STENOSIS: Primary | ICD-10-CM

## 2024-07-25 PROCEDURE — 0RT30ZZ RESECTION OF CERVICAL VERTEBRAL DISC, OPEN APPROACH: ICD-10-PCS | Performed by: ORTHOPAEDIC SURGERY

## 2024-07-25 PROCEDURE — 00NW0ZZ RELEASE CERVICAL SPINAL CORD, OPEN APPROACH: ICD-10-PCS | Performed by: ORTHOPAEDIC SURGERY

## 2024-07-25 PROCEDURE — 99204 OFFICE O/P NEW MOD 45 MIN: CPT | Performed by: HOSPITALIST

## 2024-07-25 PROCEDURE — 0RG20A0 FUSION OF 2 OR MORE CERVICAL VERTEBRAL JOINTS WITH INTERBODY FUSION DEVICE, ANTERIOR APPROACH, ANTERIOR COLUMN, OPEN APPROACH: ICD-10-PCS | Performed by: ORTHOPAEDIC SURGERY

## 2024-07-25 PROCEDURE — 76000 FLUOROSCOPY <1 HR PHYS/QHP: CPT | Performed by: ORTHOPAEDIC SURGERY

## 2024-07-25 DEVICE — GRAFT BNE SUB PROCELLULAR SPNL MTRX OSTEOCEL: Type: IMPLANTABLE DEVICE | Site: SPINE CERVICAL | Status: FUNCTIONAL

## 2024-07-25 DEVICE — COHERE CERVICAL, 7X14X12MM 7°
Type: IMPLANTABLE DEVICE | Site: SPINE CERVICAL | Status: FUNCTIONAL
Brand: COHERE

## 2024-07-25 DEVICE — COHERE CERVICAL, 6X14X12MM 7°
Type: IMPLANTABLE DEVICE | Site: SPINE CERVICAL | Status: FUNCTIONAL
Brand: COHERE

## 2024-07-25 DEVICE — SCREW SPNL 3.5X15MM ANT CERV TI ALLY ST: Type: IMPLANTABLE DEVICE | Site: SPINE CERVICAL | Status: FUNCTIONAL

## 2024-07-25 DEVICE — IMPLANTABLE DEVICE: Type: IMPLANTABLE DEVICE | Site: SPINE CERVICAL | Status: FUNCTIONAL

## 2024-07-25 RX ORDER — PHENYLEPHRINE HCL 10 MG/ML
VIAL (ML) INJECTION AS NEEDED
Status: DISCONTINUED | OUTPATIENT
Start: 2024-07-25 | End: 2024-07-25 | Stop reason: SURG

## 2024-07-25 RX ORDER — METOCLOPRAMIDE HYDROCHLORIDE 5 MG/ML
10 INJECTION INTRAMUSCULAR; INTRAVENOUS EVERY 8 HOURS PRN
Status: DISCONTINUED | OUTPATIENT
Start: 2024-07-25 | End: 2024-07-26

## 2024-07-25 RX ORDER — OXYCODONE HYDROCHLORIDE 5 MG/1
2.5 TABLET ORAL EVERY 4 HOURS PRN
Status: DISCONTINUED | OUTPATIENT
Start: 2024-07-25 | End: 2024-07-26

## 2024-07-25 RX ORDER — DOCUSATE SODIUM 100 MG/1
100 CAPSULE, LIQUID FILLED ORAL 2 TIMES DAILY
Status: DISCONTINUED | OUTPATIENT
Start: 2024-07-25 | End: 2024-07-26

## 2024-07-25 RX ORDER — HYDROMORPHONE HYDROCHLORIDE 1 MG/ML
0.6 INJECTION, SOLUTION INTRAMUSCULAR; INTRAVENOUS; SUBCUTANEOUS EVERY 5 MIN PRN
Status: DISCONTINUED | OUTPATIENT
Start: 2024-07-25 | End: 2024-07-25 | Stop reason: HOSPADM

## 2024-07-25 RX ORDER — SENNOSIDES 8.6 MG
17.2 TABLET ORAL NIGHTLY
Status: DISCONTINUED | OUTPATIENT
Start: 2024-07-25 | End: 2024-07-26

## 2024-07-25 RX ORDER — OXYCODONE HYDROCHLORIDE 5 MG/1
5 TABLET ORAL EVERY 4 HOURS PRN
Status: DISCONTINUED | OUTPATIENT
Start: 2024-07-25 | End: 2024-07-26

## 2024-07-25 RX ORDER — ENEMA 19; 7 G/133ML; G/133ML
1 ENEMA RECTAL ONCE AS NEEDED
Status: DISCONTINUED | OUTPATIENT
Start: 2024-07-25 | End: 2024-07-26

## 2024-07-25 RX ORDER — POLYETHYLENE GLYCOL 3350 17 G/17G
17 POWDER, FOR SOLUTION ORAL DAILY PRN
Status: DISCONTINUED | OUTPATIENT
Start: 2024-07-25 | End: 2024-07-26

## 2024-07-25 RX ORDER — DIPHENHYDRAMINE HYDROCHLORIDE 50 MG/ML
25 INJECTION INTRAMUSCULAR; INTRAVENOUS EVERY 4 HOURS PRN
Status: DISCONTINUED | OUTPATIENT
Start: 2024-07-25 | End: 2024-07-26

## 2024-07-25 RX ORDER — MORPHINE SULFATE 4 MG/ML
2 INJECTION, SOLUTION INTRAMUSCULAR; INTRAVENOUS EVERY 10 MIN PRN
Status: DISCONTINUED | OUTPATIENT
Start: 2024-07-25 | End: 2024-07-25 | Stop reason: HOSPADM

## 2024-07-25 RX ORDER — HYDROMORPHONE HYDROCHLORIDE 1 MG/ML
0.2 INJECTION, SOLUTION INTRAMUSCULAR; INTRAVENOUS; SUBCUTANEOUS EVERY 2 HOUR PRN
Status: DISCONTINUED | OUTPATIENT
Start: 2024-07-25 | End: 2024-07-26

## 2024-07-25 RX ORDER — HYDROMORPHONE HYDROCHLORIDE 1 MG/ML
0.2 INJECTION, SOLUTION INTRAMUSCULAR; INTRAVENOUS; SUBCUTANEOUS EVERY 5 MIN PRN
Status: DISCONTINUED | OUTPATIENT
Start: 2024-07-25 | End: 2024-07-25 | Stop reason: HOSPADM

## 2024-07-25 RX ORDER — ALBUTEROL SULFATE 90 UG/1
2 AEROSOL, METERED RESPIRATORY (INHALATION) EVERY 6 HOURS PRN
Status: DISCONTINUED | OUTPATIENT
Start: 2024-07-25 | End: 2024-07-26

## 2024-07-25 RX ORDER — ONDANSETRON 2 MG/ML
INJECTION INTRAMUSCULAR; INTRAVENOUS AS NEEDED
Status: DISCONTINUED | OUTPATIENT
Start: 2024-07-25 | End: 2024-07-25 | Stop reason: SURG

## 2024-07-25 RX ORDER — ONDANSETRON 2 MG/ML
4 INJECTION INTRAMUSCULAR; INTRAVENOUS EVERY 6 HOURS PRN
Status: DISCONTINUED | OUTPATIENT
Start: 2024-07-25 | End: 2024-07-26

## 2024-07-25 RX ORDER — BISACODYL 10 MG
10 SUPPOSITORY, RECTAL RECTAL
Status: DISCONTINUED | OUTPATIENT
Start: 2024-07-25 | End: 2024-07-26

## 2024-07-25 RX ORDER — DIAZEPAM 2 MG/1
2 TABLET ORAL EVERY 6 HOURS PRN
Status: DISCONTINUED | OUTPATIENT
Start: 2024-07-25 | End: 2024-07-26

## 2024-07-25 RX ORDER — ACETAMINOPHEN 500 MG
1000 TABLET ORAL ONCE
Status: COMPLETED | OUTPATIENT
Start: 2024-07-25 | End: 2024-07-25

## 2024-07-25 RX ORDER — LIDOCAINE HYDROCHLORIDE 10 MG/ML
INJECTION, SOLUTION EPIDURAL; INFILTRATION; INTRACAUDAL; PERINEURAL AS NEEDED
Status: DISCONTINUED | OUTPATIENT
Start: 2024-07-25 | End: 2024-07-25 | Stop reason: SURG

## 2024-07-25 RX ORDER — HYDROMORPHONE HYDROCHLORIDE 1 MG/ML
0.4 INJECTION, SOLUTION INTRAMUSCULAR; INTRAVENOUS; SUBCUTANEOUS EVERY 5 MIN PRN
Status: DISCONTINUED | OUTPATIENT
Start: 2024-07-25 | End: 2024-07-25 | Stop reason: HOSPADM

## 2024-07-25 RX ORDER — SODIUM CHLORIDE, SODIUM LACTATE, POTASSIUM CHLORIDE, CALCIUM CHLORIDE 600; 310; 30; 20 MG/100ML; MG/100ML; MG/100ML; MG/100ML
INJECTION, SOLUTION INTRAVENOUS CONTINUOUS PRN
Status: DISCONTINUED | OUTPATIENT
Start: 2024-07-25 | End: 2024-07-25 | Stop reason: SURG

## 2024-07-25 RX ORDER — DIPHENHYDRAMINE HCL 25 MG
25 CAPSULE ORAL EVERY 4 HOURS PRN
Status: DISCONTINUED | OUTPATIENT
Start: 2024-07-25 | End: 2024-07-26

## 2024-07-25 RX ORDER — BUPIVACAINE HYDROCHLORIDE AND EPINEPHRINE 5; 5 MG/ML; UG/ML
INJECTION, SOLUTION PERINEURAL AS NEEDED
Status: DISCONTINUED | OUTPATIENT
Start: 2024-07-25 | End: 2024-07-25 | Stop reason: HOSPADM

## 2024-07-25 RX ORDER — ROCURONIUM BROMIDE 10 MG/ML
INJECTION, SOLUTION INTRAVENOUS AS NEEDED
Status: DISCONTINUED | OUTPATIENT
Start: 2024-07-25 | End: 2024-07-25 | Stop reason: SURG

## 2024-07-25 RX ORDER — MORPHINE SULFATE 10 MG/ML
6 INJECTION, SOLUTION INTRAMUSCULAR; INTRAVENOUS EVERY 10 MIN PRN
Status: DISCONTINUED | OUTPATIENT
Start: 2024-07-25 | End: 2024-07-25 | Stop reason: HOSPADM

## 2024-07-25 RX ORDER — HYDROMORPHONE HYDROCHLORIDE 1 MG/ML
0.4 INJECTION, SOLUTION INTRAMUSCULAR; INTRAVENOUS; SUBCUTANEOUS EVERY 2 HOUR PRN
Status: DISCONTINUED | OUTPATIENT
Start: 2024-07-25 | End: 2024-07-26

## 2024-07-25 RX ORDER — SODIUM CHLORIDE, SODIUM LACTATE, POTASSIUM CHLORIDE, CALCIUM CHLORIDE 600; 310; 30; 20 MG/100ML; MG/100ML; MG/100ML; MG/100ML
INJECTION, SOLUTION INTRAVENOUS CONTINUOUS
Status: DISCONTINUED | OUTPATIENT
Start: 2024-07-25 | End: 2024-07-25 | Stop reason: HOSPADM

## 2024-07-25 RX ORDER — TIZANIDINE 2 MG/1
2 TABLET ORAL EVERY 8 HOURS PRN
Status: DISCONTINUED | OUTPATIENT
Start: 2024-07-25 | End: 2024-07-26

## 2024-07-25 RX ORDER — NALOXONE HYDROCHLORIDE 0.4 MG/ML
0.08 INJECTION, SOLUTION INTRAMUSCULAR; INTRAVENOUS; SUBCUTANEOUS AS NEEDED
Status: DISCONTINUED | OUTPATIENT
Start: 2024-07-25 | End: 2024-07-25 | Stop reason: HOSPADM

## 2024-07-25 RX ORDER — SODIUM CHLORIDE, SODIUM LACTATE, POTASSIUM CHLORIDE, CALCIUM CHLORIDE 600; 310; 30; 20 MG/100ML; MG/100ML; MG/100ML; MG/100ML
INJECTION, SOLUTION INTRAVENOUS CONTINUOUS
Status: DISCONTINUED | OUTPATIENT
Start: 2024-07-25 | End: 2024-07-26

## 2024-07-25 RX ORDER — LISINOPRIL 5 MG/1
5 TABLET ORAL NIGHTLY
Status: DISCONTINUED | OUTPATIENT
Start: 2024-07-25 | End: 2024-07-26

## 2024-07-25 RX ORDER — ROSUVASTATIN CALCIUM 5 MG/1
5 TABLET, COATED ORAL NIGHTLY
Status: DISCONTINUED | OUTPATIENT
Start: 2024-07-25 | End: 2024-07-26

## 2024-07-25 RX ORDER — OXYCODONE HYDROCHLORIDE 5 MG/1
10 TABLET ORAL ONCE
Status: COMPLETED | OUTPATIENT
Start: 2024-07-25 | End: 2024-07-25

## 2024-07-25 RX ORDER — METOCLOPRAMIDE HYDROCHLORIDE 5 MG/ML
10 INJECTION INTRAMUSCULAR; INTRAVENOUS EVERY 8 HOURS PRN
Status: DISCONTINUED | OUTPATIENT
Start: 2024-07-25 | End: 2024-07-25 | Stop reason: HOSPADM

## 2024-07-25 RX ORDER — MIDAZOLAM HYDROCHLORIDE 1 MG/ML
INJECTION INTRAMUSCULAR; INTRAVENOUS AS NEEDED
Status: DISCONTINUED | OUTPATIENT
Start: 2024-07-25 | End: 2024-07-25 | Stop reason: SURG

## 2024-07-25 RX ORDER — MORPHINE SULFATE 4 MG/ML
4 INJECTION, SOLUTION INTRAMUSCULAR; INTRAVENOUS EVERY 10 MIN PRN
Status: DISCONTINUED | OUTPATIENT
Start: 2024-07-25 | End: 2024-07-25 | Stop reason: HOSPADM

## 2024-07-25 RX ORDER — ONDANSETRON 2 MG/ML
4 INJECTION INTRAMUSCULAR; INTRAVENOUS EVERY 6 HOURS PRN
Status: DISCONTINUED | OUTPATIENT
Start: 2024-07-25 | End: 2024-07-25 | Stop reason: HOSPADM

## 2024-07-25 RX ORDER — CALCIUM CARBONATE 500 MG/1
500 TABLET, CHEWABLE ORAL EVERY 6 HOURS PRN
Status: DISCONTINUED | OUTPATIENT
Start: 2024-07-25 | End: 2024-07-26

## 2024-07-25 RX ORDER — LISINOPRIL 5 MG/1
5 TABLET ORAL NIGHTLY
Status: DISCONTINUED | OUTPATIENT
Start: 2024-07-25 | End: 2024-07-25

## 2024-07-25 RX ADMIN — SODIUM CHLORIDE, SODIUM LACTATE, POTASSIUM CHLORIDE, CALCIUM CHLORIDE: 600; 310; 30; 20 INJECTION, SOLUTION INTRAVENOUS at 07:22:00

## 2024-07-25 RX ADMIN — PHENYLEPHRINE HCL 100 MCG: 10 MG/ML VIAL (ML) INJECTION at 08:48:00

## 2024-07-25 RX ADMIN — ROCURONIUM BROMIDE 10 MG: 10 INJECTION, SOLUTION INTRAVENOUS at 07:30:00

## 2024-07-25 RX ADMIN — MIDAZOLAM HYDROCHLORIDE 1 MG: 1 INJECTION INTRAMUSCULAR; INTRAVENOUS at 07:15:00

## 2024-07-25 RX ADMIN — LIDOCAINE HYDROCHLORIDE 40 MG: 10 INJECTION, SOLUTION EPIDURAL; INFILTRATION; INTRACAUDAL; PERINEURAL at 07:21:00

## 2024-07-25 RX ADMIN — PHENYLEPHRINE HCL 100 MCG: 10 MG/ML VIAL (ML) INJECTION at 08:58:00

## 2024-07-25 RX ADMIN — SODIUM CHLORIDE, SODIUM LACTATE, POTASSIUM CHLORIDE, CALCIUM CHLORIDE: 600; 310; 30; 20 INJECTION, SOLUTION INTRAVENOUS at 07:30:00

## 2024-07-25 RX ADMIN — ONDANSETRON 4 MG: 2 INJECTION INTRAMUSCULAR; INTRAVENOUS at 09:15:00

## 2024-07-25 RX ADMIN — PHENYLEPHRINE HCL 100 MCG: 10 MG/ML VIAL (ML) INJECTION at 09:07:00

## 2024-07-25 RX ADMIN — PHENYLEPHRINE HCL 100 MCG: 10 MG/ML VIAL (ML) INJECTION at 07:45:00

## 2024-07-25 NOTE — ANESTHESIA PREPROCEDURE EVALUATION
Anesthesia PreOp Note    HPI:     Anita Bingham is a 64 year old female who presents for preoperative consultation requested by: Gavin Walter MD    Date of Surgery: 7/25/2024    Procedure(s):  C5-7 anterior cervical discectomy and fusion  Indication: Cervical radiculopathy, cervical spinal stenosis    Relevant Problems   No relevant active problems       NPO:  Last Liquid Consumption Date: 07/24/24  Last Liquid Consumption Time: 2200  Last Solid Consumption Date: 07/24/24  Last Solid Consumption Time: 2100  Last Liquid Consumption Date: 07/24/24          History Review:  Patient Active Problem List    Diagnosis Date Noted    Cervical spinal stenosis 07/11/2024    ASVD (arteriosclerotic vascular disease) 07/11/2024    TOS (thoracic outlet syndrome) on the left 09/06/2022    Chronic obstructive pulmonary disease (HCC) 03/01/2022    C3-4 right mild foraminal, C4-5 left mild foraminal, C5-6 bilateral mod foraminal stenosis 10/31/2020    left C6 radiculopathy 10/19/2020    C2-3 mild central, C5-6 right paracentral mild herniated discs 10/19/2020    C3-4 right mild foraminal, C4-5 left mild foraminal, C5-6 mild-mod diffuse, C6-7 mild diffuse bulging discs 10/19/2020    Hypercholesterolemia 04/13/2018    Irritable bowel syndrome 09/08/2017    History of stent insertion of renal artery 07/25/2017    Hypertension, essential 07/25/2017    Smoker 07/25/2017    Vitamin D deficiency 01/29/2016    Osteopenia 10/14/2014       Past Medical History:    Atypical ductal hyperplasia of right breast    Needle localization R breast biopsy Dr Bahena at [facility]. path-atypical ductal hyperplasia, flat epithelial atypia, atypical lobular hyperplasia.  Rx E [place] per Dr. Bahena    Back problem    neck and shoulder    Bilateral renal artery stenosis (HCC)    Bilateral renal artery stents-12/29/17-Dr. Wong. Left renal subcapsular hematoma post procedure. BETTE related to atherosclerosis.     Cervical arthritis    MRI C-spine-11/4/19-- disc  degeneration, DJD, multiple perineural nerve root sleeve cysts.    COVID-19    Outpatient    Dilated cardiomyopathy ([facility])    cardiac cath Dr Nuñez 8/10/17 -Coronary arteries normal. Mild global LV hypokinesis. Estimated EF 40-45%    DJD (degenerative joint disease) of cervical spine    MRI cervical spine 11/4/19-degenerative disc disease, DJD, multiple perineural nerve root sleeve cysts    Essential hypertension    H/O colonoscopy    Colonoscopy Dr. Card 6/26/08-hyperplastic polyp and internal hemorrhoids.    Hematoma of left kidney    left kidney subcapsular hematoma--complication of renal artery stenting procedure    High blood pressure    High cholesterol    IBS    Lipid screening    Per [employer]    Muscle weakness    arms due to neck bulging disc    Musculoskeletal disorder    left foot bunion (surgery)    Numbness and tingling of left arm and leg    MRI brain-11/4/19-3 x 2 mm pituitary nodule c/w incidental microadenoma. 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  Heparin-then Eliquis until 12/2017.    Renal disorder    kidney blockage stent placement    Vitamin D deficiency       Past Surgical History:   Procedure Laterality Date    Breast biopsy Right 11/28/2012    R needle loc breast bx Dr Bahena at [place]    Carpal tunnel release Right 02/2021    Dr Russell    Cath renal stent Bilateral 12/29/2017    Bilateral renal artery stents Dr. Wong at [facility].    Colonoscopy  06/28/2008    Colonoscopy Dr. Card-hyperplastic polyp and internal hemorrhoids.    D & c  1998 & 1999    missed ab    Elbow surgery Right 12/23/2020    Arthroscopy R elbow with tendon repair --Dr Getachew Russell at [place].     Foot surgery  Left      L foot bunion surgery x2    Kidney surgery            Other surgical history  12, 17, 2020       Medications Prior to Admission   Medication Sig Dispense Refill Last Dose    albuterol (PROAIR HFA) 108 (90 Base) MCG/ACT Inhalation Aero Soln Inhale 2 puffs into the lungs every 6 (six) hours as needed for Wheezing. 3 each 3 2024 at 0430    MAGNESIUM GLYCINATE OR Take 480 mg by mouth daily. Take two daily   Past Week    Ascorbic Acid (VITAMIN C) 1000 MG Oral Tab Take 1 tablet (1,000 mg total) by mouth daily.   Past Week    Zinc 50 MG Oral Tab Take 1 tablet by mouth daily.   Past Week    lisinopril 5 MG Oral Tab Take 1 tablet (5 mg total) by mouth daily. (Patient taking differently: Take 1 tablet (5 mg total) by mouth nightly.) 90 tablet 3 2024    [] metoprolol succinate ER 25 MG Oral Tablet 24 Hr Take 0.5 tablets (12.5 mg total) by mouth daily. (Patient taking differently: Take 0.5 tablets (12.5 mg total) by mouth nightly.) 45 tablet 3 2024    Vitamin D, Cholecalciferol, 50 MCG (2000 UT) Oral Cap Take 1 tablet by mouth daily. 30 capsule 0 Past Week    metoprolol succinate ER 25 MG Oral Tablet 24 Hr Take 0.5 tablets (12.5 mg total) by mouth daily.   2024    rosuvastatin 5 MG Oral Tab Take 1 tablet (5 mg total) by mouth nightly.   2024     Current Facility-Administered Medications Ordered in Epic   Medication Dose Route Frequency Provider Last Rate Last Admin    lactated ringers infusion   Intravenous Continuous Gavin Walter MD 20 mL/hr at 24 0635 New Bag at 24 0635    [Transfer Hold] metoprolol tartrate (Lopressor) tab 25 mg  25 mg Oral Once PRN Gavin Walter MD        ceFAZolin (Ancef) 2g in 10mL IV syringe premix  2 g Intravenous Once Gavin Walter MD         No current [facility]-ordered outpatient medications on file.       No Known Allergies    Family History   Problem Relation Age of Onset    Hypertension Father     Pulmonary Disease  Father         smoker    Lipids Father     Arthritis Father     Dementia Father     Heart Disease Father          age 88    Other (cancer bladder) Father 77    Hypertension Mother     Diabetes Mother     Dementia Mother 90    Other ( age 94 after hip fracture) Mother     Hypertension Other     Other (Other) Brother         L & W    Other (Other) Brother         L & W    Other (5 daughters) Daughter     Other (2 sons) Son         1 son with Aspergers    Other (ASD) Daughter 36        robotic repair    Other (cancer thyroid) Daughter 40     Social History     Socioeconomic History    Marital status:    Occupational History    Occupation:    Tobacco Use    Smoking status: Every Day     Current packs/day: 0.75     Average packs/day: 0.8 packs/day for 46.6 years (34.9 ttl pk-yrs)     Types: Cigarettes     Start date: 1978     Passive exposure: Never    Smokeless tobacco: Never    Tobacco comments:     N/A   Vaping Use    Vaping status: Never Used   Substance and Sexual Activity    Alcohol use: Yes     Alcohol/week: 1.0 standard drink of alcohol     Types: 1 Glasses of wine per week     Comment: 1 glass wine per week    Drug use: No    Sexual activity: Yes     Partners: Male     Comment: postmenopausal 17 current   Other Topics Concern    Caffeine Concern Yes     Comment: Coffee, 2-3 cups daily.    Exercise Yes     Comment: few times weekly       Available pre-op labs reviewed.  Lab Results   Component Value Date    WBC 10.5 2024    RBC 4.16 2024    HGB 12.9 2024    HCT 38.6 2024    MCV 92.8 2024    MCH 31.0 2024    MCHC 33.4 2024    RDW 12.8 2024    .0 2024     Lab Results   Component Value Date     07/10/2024    K 3.7 07/10/2024     (H) 07/10/2024    CO2 23.0 07/10/2024    BUN 22 07/10/2024    CREATSERUM 0.81 07/10/2024     (H) 07/10/2024    CA 9.6 07/10/2024     Lab Results   Component Value Date    INR  0.96 07/10/2024       Vital Signs:  Body mass index is 22.8 kg/m².   height is 1.651 m (5' 5\") and weight is 62.1 kg (137 lb). Her oral temperature is 97.6 °F (36.4 °C). Her blood pressure is 127/79 and her pulse is 86. Her respiration is 18 and oxygen saturation is 98%.   Vitals:    06/28/24 0920 07/25/24 0636   BP:  127/79   Pulse:  86   Resp:  18   Temp:  97.6 °F (36.4 °C)   TempSrc:  Oral   SpO2:  98%   Weight: 59 kg (130 lb) 62.1 kg (137 lb)   Height: 1.651 m (5' 5\") 1.651 m (5' 5\")        Anesthesia Evaluation      No history of anesthetic complications   Airway   Mallampati: II  TM distance: >3 FB  Neck ROM: limited  Dental - Dentition appears grossly intact     Pulmonary - normal exam   (+) COPD    ROS comment: Pt used inhaler prior to OR  Cardiovascular - normal exam  Exercise tolerance: good  (+) hypertension    Neuro/Psych - negative ROS     GI/Hepatic/Renal    (-) GERD    Endo/Other    (-) diabetes mellitus  Abdominal   (-) obese     Other findings: 4/5 weakness noted in L UE. Sensation intact bilaterally            Anesthesia Plan:   ASA:  2  Plan:   General  Monitors and Lines:   Additonal IV  Airway:  ETT  Post-op Pain Management: IV analgesics  Informed Consent Plan and Risks Discussed With:  Patient      I have informed Anita Bingham and/or legal guardian or family member of the nature of the anesthetic plan, benefits, risks including possible dental damage if relevant, major complications, and any alternative forms of anesthetic management.   All of the patient's questions were answered to the best of my ability. The patient desires the anesthetic management as planned.  Aleksandr Macdonald DO  7/25/2024 7:13 AM  Present on Admission:  **None**

## 2024-07-25 NOTE — H&P
History and Physical: Brief Update    I have reviewed the history and physical performed within the last 30 days.  I agree with this assessment and have no further additions.  The consent form is signed and present in the chart.  The patient desires to proceed with the planned operative intervention.    Marcial Ahumada PA-C  Physician Assistant for Dr. Gavin Walter MD  [facility]  7/25/2024  6:52 AM

## 2024-07-25 NOTE — CONSULTS
[facility]    PATIENT'S NAME: OZZIE RAMOS   ATTENDING PHYSICIAN: Gavin Walter MD   CONSULTING PHYSICIAN: Dima Quesada MD   PATIENT ACCOUNT#:   [id number]    LOCATION:   Room 8 A [facility]  MEDICAL RECORD #:   [id number]       YOB: 1960  ADMISSION DATE:       07/25/2024      CONSULT DATE:  07/25/2024    REPORT OF CONSULTATION    REASON FOR ADMISSION:  Anterior cervical discectomy and fusion.    HISTORY OF PRESENT ILLNESS:  The patient is a 64-year-old  female with chronic neck pain and radiculopathy, cervical spinal stenosis, failed outpatient conservative medical management options, scheduled today for above-mentioned procedure by her spine orthopedic surgeon, Dr. Gavin Walter.  Postoperatively, transferred to PACU for further monitoring.    PAST MEDICAL HISTORY:  Degenerative joint disease of cervical spine, hyperlipidemia, hypertension, irritable bowel syndrome.  She had history of non-ischemic cardiomyopathy, recovered ejection fraction.     PAST SURGICAL HISTORY:  Right breast biopsy, right elbow arthroscopic procedure, right renal artery stent, left foot bunionectomy.    MEDICATIONS:  Please see medication reconciliation list.    ALLERGIES:  No known drug allergies.    SOCIAL HISTORY:  Smokes half pack a day.  Social alcohol.  No drug use.  Lives with her family.  Independent for basic activities of daily living.    FAMILY HISTORY:  Father had COPD and hypertension.  Mother had diabetes mellitus type 2 and hypertension.    REVIEW OF SYSTEMS:  Currently resting in bed.  Neck discomfort.  No upper extremity tingling or numbness.  Other 12-point review of systems is negative.    PHYSICAL EXAMINATION:    GENERAL:  Alert and oriented to time, place, and person.  Mild to moderate distress.  VITAL SIGNS:  Temperature 97.4, pulse 80, respiratory rate 14, blood pressure 120/76, pulse ox 96% on room air.  HEENT:  Atraumatic.  Oropharynx clear.  NECK:  Anterior dressing.  Hemovac  surgical drain.  Soft collar.  LUNGS:  Clear to auscultation bilaterally.  Normal respiratory effort.  HEART:  Regular rate, rhythm.  S1 and S2 auscultated.  No murmur.   ABDOMEN:  Soft, nondistended.  No tenderness.  Positive bowel sounds  EXTREMITIES:  No peripheral edema, clubbing, or cyanosis.  NEUROLOGIC:   Motor and sensory intact.     ASSESSMENT AND PLAN:    1.   Cervical spinal stenosis with cervical radiculopathy status post C5-C7 anterior cervical discectomy and fusion.  Pain control.  Monitor surgical wound and drain.  Neuro checks.  DVT prophylaxis.  Physical and occupational therapy.  2.   Essential hypertension.  Continue home medications and monitor.  3.   Hyperlipidemia.  Continue home medications.    Dictated By Dima Quesada MD  d: 07/25/2024 10:56:44  t: 07/25/2024 11:31:19  Job [id number]  FB/    cc: Gavin Walter MD

## 2024-07-25 NOTE — ANESTHESIA POSTPROCEDURE EVALUATION
97.4Patient: Anita Bingham    Procedure Summary       Date: 07/25/24 Room / Location: [facility] MAIN OR [address] [place] [address] MAIN OR    Anesthesia Start: 0714 Anesthesia Stop: 0942    Procedure: C5-7 anterior cervical discectomy and fusion (Spine Cervical) Diagnosis: (Cervical radiculopathy, cervical spinal stenosis)    Surgeons: Gavin Walter MD Anesthesiologist: Aleksandr Macdonald DO    Anesthesia Type: general ASA Status: 2            Anesthesia Type: general    Vitals Value Taken Time   /80 07/25/24 0942   Temp 97.4 07/25/24 0944   Pulse 88 07/25/24 0943   Resp 14 07/25/24 0943   SpO2 95 % 07/25/24 0943   Vitals shown include unfiled device data.    [facility] AN Post Evaluation:   Patient Evaluated in PACU  Patient Participation: complete - patient participated  Level of Consciousness: awake  Pain Management: adequate  Airway Patency:patent  Yes    Nausea/Vomiting: none  Cardiovascular Status: acceptable  Respiratory Status: acceptable  Postoperative Hydration acceptable      Aleksandr Macdonald DO  7/25/2024 9:44 AM

## 2024-07-25 NOTE — ANESTHESIA PROCEDURE NOTES
Airway  Date/Time: 7/25/2024 7:22 AM  Urgency: elective      General Information and Staff    Patient location during procedure: OR  Anesthesiologist: Aleksandr Macdonald DO  Performed: anesthesiologist   Performed by: Aleksandr Macdonald DO  Authorized by: Aleksandr Macdonald DO      Indications and Patient Condition  Indications for airway management: anesthesia  Sedation level: deep  Preoxygenated: yes  Patient position: sniffing  Mask difficulty assessment: 1 - vent by mask  Planned trial extubation    Final Airway Details  Final airway type: endotracheal airway      Successful airway: ETT  Cuffed: yes   Successful intubation technique: Video laryngoscopy  Endotracheal tube insertion site: oral  Blade: GlideScope  Blade size: #3  ETT size (mm): 7.0    Cormack-Lehane Classification: grade I - full view of glottis  Placement verified by: capnometry   Measured from: lips  ETT to lips (cm): 20  Number of attempts at approach: 1  Ventilation between attempts: none  Number of other approaches attempted: 0

## 2024-07-25 NOTE — PROGRESS NOTES
[facility]  part of [facility]    Ortho Medical Progress Note     Anita Bingham Patient Status:  Outpatient in a Bed    1960 MRN [id number]   Location [facility] POST ANESTHESIA CARE UNIT Attending Gavin Walter MD   Hosp Day # 0 PCP Armando Norman MD       Subjective:   Anita Bingham is a(n) 64 year old female post operative cervical  ACDF per Dr. Walter post op day # 0.  Denies nausea and vomiting, dizziness.  WBAT.  The pain is controlled after treated.      Objective:   Vital Signs:  Blood pressure 122/73, pulse 75, temperature 97.4 °F (36.3 °C), temperature source Temporal, resp. rate 11, height 5' 5\" (1.651 m), weight 137 lb (62.1 kg), last menstrual period 2008, SpO2 96%, not currently breastfeeding.     General: No acute distress. Sleepy  Cervical collar in place with hemovac from incision line  HEENT: Moist mucous membranes. PERRL  Respiratory: Clear to auscultation bilaterally.  No wheezes. No rhonchi.   Cardiovascular: S1, S2.  Regular rate and rhythm.    Abdomen: Soft, nontender, nondistended.  Positive bowel sounds   Neurologic: Oriented and alert. Sensation present in bilateral lower extremities   :  no davenport  Musculoskeletal: Surgical dressing dry and intact.  No calf tenderness.    Skin: No lesions. No erythema. Color normal  Psychiatric: Appropriate mood and affect.      Assessment and Plan:     Pre op labs reviewed prior to the procedure        Cervical spinal stenosis  POD #0 stable      Hypertension, essential        Hypercholesterolemia        No results found.          PT/OT: per ortho  Pain Control: per ortho -  Incentive Spirometry  DVT Prophylaxis - mechanical  GI Prophylaxis -  Home medications reconciled     The patient was discussed with Dr. Craig.  We are following for medical assessment by monitoring respiratory status, hemodynamics, GI status, reviewing labs, and assisting with anticoagulation monitoring as patient appropriate.          Is this  a shared or split note between Advanced Practice Provider and Physician? No     FELIPE Chavez  Office [phone]  Cell [phone]  7/25/2024

## 2024-07-25 NOTE — OPERATIVE REPORT
PATIENT  Anita Bingham    DATE  7/25/24    SURGEON  Gavin Walter MD     ASSISTANT  ANNA Solis     PREOPERATIVE DIAGNOSIS  Cervical radiculopathy  Cervical spondylosis C5-7     POSTOPERATIVE DIAGNOSIS    Same     PROCEDURE   1. Anterior cervical diskectomy-foraminotomy and fusion, C5-7  2. Application of biomechanical interbody cage, C5-7  3. Anterior cervical spinal plate instrumentation, C5-7  4. Hemicorpectomy C5  5. Application of local bone autograft and allograft (1 mL nanOss).   6. Intraoperative neuromonitoring (somatosensory evoked potentials).   7. Intraoperative microscope.   8. Intraoperative fluoroscopy.      DESCRIPTION OF PROCEDURE     ANESTHESIA  General endotrachial anesthesia     ESTIMATED BLOOD LOSS  Less than 25 ml     URINE OUTPUT   n/a    DRAINS  Medium hemovac        IMPLANTS  Blair ACDF cage with anterior plate, screws 14mm    COMPLICATIONS   None.     INDICATIONS  The patient is a 64 year old female, with a persistant cervical radiculopathy and neck pain despite physical therapy, injection therapy, and medical treatment. Risks and benefits of anterior spinal fusion surgery were explained to the patient and family/guardian in great detail. Risks included infection, nerve damage, blood loss requiring transfusion, reoperation for any reason, and the general risks of anesthesia. A consent was signed.      PROCEDURE  The patient was met in the preoperative area. The neck was marked on the operative site. LESTER hose stockings were placed on the bilateral lower extremities. They was transferred to the operating room where general anesthesia was administered via endotracheal intubation. They were given antibiotic medication for infectious prophylaxis. Neuromonitoring leads were placed on the extremities. SCDs were placed on the bilateral lower extremities and turned on for DVT prophylaxis.     The patient was repositioned supine on the operative table without a bump. Arms were draped at the sides  with arm cradles, padding the elbows. All other bony prominences were thoroughly padded. The shoulders were depressed using a small amount of traction by taping the shoulders down to the bed. We obtained a lateral fluoroscopic view to vamshi our start site and confirm adequate bony visualization. The neck was then prepped and draped in the usual sterile fashion.      A timeout was performed in conjunction with WHO guidelines, and everyone in the room was in agreement with regard to the patient, procedure, surgical site, instrumentation and antibiotics.     We began by making a transverse incision in line with one of the patient's neck crease. We sharply incised through the skin down to the level of the platysma muscle. The platysma was sharply defined with a knife. We undermined the skin slightly above the platysma in order to create extra mobility. We then divided the platysma with Bovie electrocautery. Metzenbaum scissors were used to spread under the platysma, and it was further divided in a transverse fashion. We then undermined the platysma in both the cephalad and caudal directions. We next developed the interval between the strap muscles and the sternocleidomastoid and proceeded with the standard approach down to the anterior cervical spine. The omohyoid was retracted inferiorly and medially. The plane between the midline structures and SCM muscle was gently opened with scissors dissection. Care was taken to maintain hemostasis at every step using retraction and electrocautery.     The cervical spine was identified, and the prevertebral fascia was divided using the Metzenbaum scissors. Peanut retractors were then used to spread and open the fascia from a medial to lateral direction. A spinal needle was placed in the presumed C5 body, and its position was confirmed on lateral fluoroscopy. We then completely exposed the C5-6 disk. A Shadow-Line retractor was then placed under the longus colli. Seco pins were  placed in C5 and C6 body. For the superior body, the caspar pin was removed and bone marrow aspirate was removed.  We again used fluoroscopy to confirm that we were at the correct level.     An annulotomy was performed using a 15 blade Under the microscope, diskectomy and endplate preparation was performed with a series of pituitary rongeurs, Kerrison rongeurs and curved and straight curettes. We then scraped the endplates with the curette. The anteroinferior ridge at C5 was removed with a #3 Kerrison punch after distracting through the [place] pins. We then used a bur to remove 1 to 2 mm of the sclerotic endplate from the superior aspect of the superior body. The inferior 1/3 of the C5 vertebral body was removed for central canal decompression and forminotomy. Curved curettes were used to pass around the posterior vertebral body. The posterior longitudinal ligament was identified and resected with a #1 Kerrison. Foraminotomies were performed bilaterally as we had dissected out to the level of the uncovertebral joints and then undercut posteriorly. After completing our diskectomy and decompression at this level, we irrigated, then achieved hemostasis with FloSeal and a elmo. We then sized and placed our cage implant. Following this we placed our final cage for this level. We then removed the caspar pins and placed bone wax in the hole.     The identical procedure was then performed at C6-7.     Following this we measured for our final anterior cervical plate. The plate was separate from the interbody. Screws were placed in a medial fashion through the plate.  We then confirmed the proper location of the plate with flouroscopy.     Final AP and lateral fluoroscopic views were obtained, confirming appropriate position of the plate.      Hemostatsis was again confirmed prior to beginning closure. A hemovac drain was placed deep, overlying the vertebral bodies. We then began our closure. Closure was performed with 3-0  Vicryl to close the platysmal layer and the deep dermal layer. Skin was closed with a running 4-0 monocryl for the subcuticular layer.     All sponge and needle counts were correct prior to closure. There were no changes in our baseline SSEP neuromonitoring signals throughout the case.      I was present and performed all critical aspects of this case.

## 2024-07-25 NOTE — ANESTHESIA PROCEDURE NOTES
Peripheral IV  Date/Time: 7/25/2024 7:35 AM  Inserted by: Aleksandr Macdonald DO    Placement  Needle size: 18 G  Laterality: right  Location: hand  Local anesthetic: none  Site prep: alcohol  Technique: anatomical landmarks  Attempts: 1

## 2024-07-26 ENCOUNTER — APPOINTMENT (OUTPATIENT)
Dept: GENERAL RADIOLOGY | Facility: HOSPITAL | Age: 64
End: 2024-07-26
Attending: PHYSICIAN ASSISTANT
Payer: COMMERCIAL

## 2024-07-26 VITALS
BODY MASS INDEX: 22.82 KG/M2 | WEIGHT: 137 LBS | TEMPERATURE: 98 F | DIASTOLIC BLOOD PRESSURE: 62 MMHG | OXYGEN SATURATION: 94 % | HEIGHT: 65 IN | SYSTOLIC BLOOD PRESSURE: 89 MMHG | RESPIRATION RATE: 14 BRPM | HEART RATE: 69 BPM

## 2024-07-26 LAB
ATRIAL RATE: 78 BPM
DEPRECATED RDW RBC AUTO: 42.5 FL (ref 35.1–46.3)
ERYTHROCYTE [DISTWIDTH] IN BLOOD BY AUTOMATED COUNT: 12.8 % (ref 11–15)
HCT VFR BLD AUTO: 34.6 %
HGB BLD-MCNC: 11.7 G/DL
MCH RBC QN AUTO: 30.7 PG (ref 26–34)
MCHC RBC AUTO-ENTMCNC: 33.8 G/DL (ref 31–37)
MCV RBC AUTO: 90.8 FL
P AXIS: 63 DEGREES
P-R INTERVAL: 156 MS
PLATELET # BLD AUTO: 163 10(3)UL (ref 150–450)
Q-T INTERVAL: 418 MS
QRS DURATION: 128 MS
QTC CALCULATION (BEZET): 476 MS
R AXIS: -2 DEGREES
RBC # BLD AUTO: 3.81 X10(6)UL
T AXIS: 6 DEGREES
VENTRICULAR RATE: 78 BPM
WBC # BLD AUTO: 9.6 X10(3) UL (ref 4–11)

## 2024-07-26 PROCEDURE — 72040 X-RAY EXAM NECK SPINE 2-3 VW: CPT | Performed by: PHYSICIAN ASSISTANT

## 2024-07-26 RX ORDER — MELATONIN
6 NIGHTLY PRN
Status: DISCONTINUED | OUTPATIENT
Start: 2024-07-26 | End: 2024-07-26

## 2024-07-26 RX ORDER — OXYCODONE HYDROCHLORIDE 5 MG/1
2.5 TABLET ORAL EVERY 4 HOURS PRN
Status: SHIPPED | COMMUNITY
Start: 2024-07-26

## 2024-07-26 RX ORDER — DEXAMETHASONE SODIUM PHOSPHATE 4 MG/ML
10 VIAL (ML) INJECTION ONCE
Status: COMPLETED | OUTPATIENT
Start: 2024-07-26 | End: 2024-07-26

## 2024-07-26 RX ORDER — DIAZEPAM 2 MG/1
2 TABLET ORAL EVERY 6 HOURS PRN
Status: SHIPPED | COMMUNITY
Start: 2024-07-26

## 2024-07-26 RX ORDER — TIZANIDINE 2 MG/1
2 TABLET ORAL EVERY 8 HOURS PRN
Status: SHIPPED | COMMUNITY
Start: 2024-07-26

## 2024-07-26 NOTE — PHYSICAL THERAPY NOTE
PHYSICAL THERAPY EVALUATION - INPATIENT     Room Number: Room 8/Room 8-A  Evaluation Date: 2024  Type of Evaluation: Initial   Physician Order: PT Eval and Treat    Presenting Problem: cervical radiculopathy s/p C5-7 ACDF w/BG and instrumentation, soft or Aspen collar prn  Co-Morbidities : HTN, HLD, IBS, non-ischemic cardiomyopathy, L bunionectomy, carpal tunnel surgery  Reason for Therapy: Mobility Dysfunction and Discharge Planning    PHYSICAL THERAPY ASSESSMENT   Patient is a 64 year old female admitted 2024 for ***.  Prior to admission, patient's baseline is ***.  Patient is currently functioning {OT/PT Functioning Baseline:34208} baseline with {PT Mobility:87240}.    PLAN  Patient has been evaluated and presents with no skilled Physical Therapy needs at this time.  Patient will be discharged from Physical Therapy services. Please re-order if a new functional limitation presents during this admission.    PHYSICAL THERAPY MEDICAL/SOCIAL HISTORY   History related to current admission: ***     Problem List  Principal Problem:    Cervical spinal stenosis  Active Problems:    Hypertension, essential    Hypercholesterolemia      HOME SITUATION  Home Situation  [facility]  Home Layout: One level  Stairs to Enter : 5  Railing: Yes  Stairs to Bedroom: 0  Lives With: Spouse (who is home and can assist pt)  Drives: Yes  Patient Owned Equipment: None  Patient Regularly Uses: Glasses     Prior Level of [place]: ***    SUBJECTIVE  ***    PHYSICAL THERAPY EXAMINATION   OBJECTIVE  Precautions: Spine (cervical soft collar or Aspen collar for comfort,  and pt both verbalize understanding)  Fall Risk: Standard fall risk    WEIGHT BEARING RESTRICTION  Weight Bearing Restriction: None                PAIN ASSESSMENT  Ratin  Location: cervical area, shoulders  Management Techniques: Body mechanics;Breathing techniques;Relaxation;Repositioning;Other (Comment) (use of ice, use of collar for  comfort)  COGNITION  {Cognition:3840}    RANGE OF MOTION AND STRENGTH ASSESSMENT  Upper extremity ROM and strength are within functional limits {PT UE ROM Exception to WFLs:4397}  Lower extremity ROM is within functional limits {PT LE ROM Exception to WFLs:4503}  Lower extremity strength is within functional limits {PT LE Strength Exception to WFLs:4435}    BALANCE  Static Sitting: Normal  Dynamic Sitting: Normal  Static Standing: Good  Dynamic Standing: Fair +    ADDITIONAL TESTS                                    NEUROLOGICAL FINDINGS                      ACTIVITY TOLERANCE  Pulse: 69  Heart Rate Source: Monitor     BP: (!) 89/62  BP Location: Right arm  BP Method: Automatic  Patient Position: Sitting    O2 WALK  Oxygen Therapy  SPO2% on Room Air at Rest: 99  SPO2% Ambulation on Room Air: 98    AM-PAC '6-Clicks' INPATIENT SHORT FORM - BASIC MOBILITY  How much difficulty does the patient currently have...  Patient Difficulty: Turning over in bed (including adjusting bedclothes, sheets and blankets)?: A Little   Patient Difficulty: Sitting down on and standing up from a chair with arms (e.g., wheelchair, bedside commode, etc.): A Little   Patient Difficulty: Moving from lying on back to sitting on the side of the bed?: A Little   How much help from another person does the patient currently need...   Help from Another: Moving to and from a bed to a chair (including a wheelchair)?: A Little   Help from Another: Need to walk in hospital room?: A Little   Help from Another: Climbing 3-5 steps with a railing?: A Little     AM-PAC Score:  Raw Score: 18   Approx Degree of Impairment: 46.58%   Standardized Score (AM-PAC Scale): 43.63   CMS Modifier (G-Code): CK    FUNCTIONAL ABILITY STATUS  Functional Mobility/Gait Assessment  Gait Assistance: Supervision;Modified independent  Distance (ft): 50  Assistive Device: None  Pattern: Within Functional Limits  Stairs: Stairs;Car transfer  How Many Stairs: 4  Device: 1  Rail  Assist: Contact guard assist ( educated and able to assist pt)  Pattern: Ascend and Descend  Ascend and Descend : Reciprocal  Car transfer: Patient and  educated regarding spine precuations in/out of car w/collar, sit first and swing in legs  Rolling: {OT/PT Functional Status:30363}  Supine to Sit: {OT/PT Functional Status:67527}  Sit to Supine: {OT/PT Functional Status:71306}  Sit to Stand: {OT/PT Functional Status:88009}    Exercise/Education Provided:  {PT Treatment Provided:3926}    Skilled Therapy Provided: ***    The patient's Approx Degree of Impairment: 46.58% has been calculated based on documentation in the [facility] '6 clicks' Inpatient Basic Mobility Short Form.  Research supports that patients with this level of impairment may benefit from ***.  Final disposition will be made by interdisciplinary medical team.    Patient End of Session: Up in chair;With  staff;Needs met;Call light within reach;RN aware of session/findings;Bracing education provided per handout;All patient questions and concerns addressed;Family present    Patient was able to achieve the following ...   Patient able to transfer  {PT Quick Eval Goal Outcome:4199}    Patient able to ambulate on level surfaces  {PT Quick Eval Goal Outcome:4199}     Patient Evaluation Complexity Level:  History Moderate - 1 or 2 personal factors and/or co-morbidities   Examination of body systems High - addressing a total of 4 or more elements   Clinical Presentation Low- Stable   Clinical Decision Making  Low Complexity     Therapeutic Activity:  26 minutes

## 2024-07-26 NOTE — OCCUPATIONAL THERAPY NOTE
OCCUPATIONAL THERAPY EVALUATION - INPATIENT     Room Number: Room 8/Room 8-A  Evaluation Date: 2024  Type of Evaluation: Initial  Presenting Problem: e cervical  ACDF C5-7    Physician Order: IP Consult to Occupational Therapy  Reason for Therapy: ADL/IADL Dysfunction and Discharge Planning    OCCUPATIONAL THERAPY ASSESSMENT   Patient is a 64 year old female admitted 2024.  Prior to admission, patient's baseline is independent.  Patient is currently functioning near baseline with ADLs.    PLAN  Patient has been evaluated and presents with no skilled Occupational Therapy needs  at this time.  Patient will be discharged from Occupational Therapy services. Please re-order if a new functional limitation presents during this admission.    OT Device Recommendations: Reacher      OCCUPATIONAL THERAPY MEDICAL/SOCIAL HISTORY   Problem List  Principal Problem:    Cervical spinal stenosis  Active Problems:    Hypertension, essential    Hypercholesterolemia    HOME SITUATION  Type of Home: Condo  Home Layout: One level  Lives With: Spouse (who is home and can assist pt)  Toilet and Equipment: Comfort height toilet  Shower/Tub and Equipment: Walk-in shower; Other (Comment) (built in bench)  Occupation/Status:   Hand Dominance: Right  Drives: Yes  Patient Regularly Uses: Glasses    Prior Level of [place]: independent    SUBJECTIVE  \"I think this collar is too big for me\"    OCCUPATIONAL THERAPY EXAMINATION    OBJECTIVE  Precautions: Spine (cervical soft collar or Aspen collar for comfort,  and pt both verbalize understanding)  Fall Risk: Standard fall risk    WEIGHT BEARING RESTRICTION     PAIN ASSESSMENT  Ratin  Location: surgical site  Management Techniques: Repositioning; Relaxation; Other (Comment) (attempts with soft collar re-positioning)    COGNITION  Overall Cognitive Status:  WFL - within functional limits    VISION  Current Vision: no visual deficits      Communication:  WFL    Behavioral/Emotional/Social: WFL    RANGE OF MOTION   Upper extremity ROM is within functional limits     STRENGTH ASSESSMENT  Upper extremity strength NT with spine prec    COORDINATION  Gross Motor: WFL   Fine Motor: WFL     ACTIVITIES OF DAILY LIVING ASSESSMENT  AM-PAC ‘6-Clicks’ Inpatient Daily Activity Short Form  How much help from another person does the patient currently need…  -   Putting on and taking off regular lower body clothing?: None  -   Bathing (including washing, rinsing, drying)?: A Little  -   Toileting, which includes using toilet, bedpan or urinal? : None  -   Putting on and taking off regular upper body clothing?: None  -   Taking care of personal grooming such as brushing teeth?: None  -   Eating meals?: None    AM-PAC Score:  Score: 23  Approx Degree of Impairment: 15.86%  Standardized Score (AM-PAC Scale): 51.12  CMS Modifier (G-Code): CI    FUNCTIONAL TRANSFER ASSESSMENT  Sit to Stand: Chair  Chair: Independent  Toilet Transfer: Independent    BED MOBILITY discussed log roll techs     BALANCE ASSESSMENT  Static Sitting: Independent  Static Standing: Independent    FUNCTIONAL ADL ASSESSMENT  Eating: Independent  Grooming Standing: Modified Independent  LB Dressing Seated: Modified Independent (cues for figure four to maintain spine prec)  Toileting Seated: Modified Independent    Skilled Therapy Provided: RN approved session. Received patient in chair with soft c-collar on. Collar appears to be too tall for pt and pt reported it feels that it's pushing her chin up too much. Per RN, this is the smallest size available. Surgeon present during session and notified about collar. He reported its for comfort and she doesn't have to wear it. He stated he would attempt to provide her a hard collar in hopes of better fit. Pt also c/o edema at lateral aspect of foot/metatarsal 4-5 and worried about fracture. Pt reported min pain with mobility during session. MD also aware of pt's foot  edema/pain. Performed ADLs/functional mobility/transfers as stated above. Primarily limited by pain, difficulty with soft c-collar positioning.  Provided skilled cues/education on OT role, POC, functional transfer training, BADL training, compensatory strategies,, AE usage, c-spine prec, positioning in bed/log roll, body mechanics while sitting in chair/computer use and importance of proper positioning and patient with good receptiveness. At the end of session, patient left in chair with needs met, alarm on and RN aware of session.    EDUCATION PROVIDED  Patient: Role of Occupational Therapy; Plan of Care; Functional Transfer Techniques; Surgical Precautions; Posture/Positioning; Compensatory ADL Techniques  Patient's Response to Education: Verbalized Understanding; Returned Demonstration  Family/Caregiver: Surgical Precautions; Posture/Positioning; Proper Body Mechanics  Family/Caregiver's Response to Education: Verbalized Understanding; Returned Demonstration    The patient's Approx Degree of Impairment: 15.86% has been calculated based on documentation in the [facility] '6 clicks' Inpatient Daily Activity Short Form.  Research supports that patients with this level of impairment may benefit from intermittent assist.  Final disposition will be made by interdisciplinary medical team.    Patient End of Session: Up in chair;Needs met;Call light within reach;RN aware of session/findings;All patient questions and concerns addressed;Family present    Patient was able to achieve the following ...   Patient able to toilet transfer  safely and independently    Patient able to dress lower extremities  safely and independently    Patient/Caregiver able to demonstrate safety with ADLS  safely and independently     Patient Evaluation Complexity Level:   Occupational Profile/Medical History LOW - Brief history including review of medical or therapy records    Specific performance deficits impacting engagement in ADL/IADL LOW  1 - 3  performance deficits    Client Assessment/Performance Deficits LOW - No comorbidities nor modifications of tasks    Clinical Decision Making LOW - Analysis of occupational profile, problem-focused assessments, limited treatment options    Overall Complexity LOW     OT Session Time: 25 minutes  Self-Care Home Management: 15 minutes  10 min robin Garvin, OTR/L

## 2024-07-26 NOTE — OCCUPATIONAL THERAPY NOTE
Order received and chart reviewed. Attempted to see pt for OT today. Pt is at Xray per discussion with RN. Will re-attempt when pt is available.    Tabatha Garvin, OTR/L

## 2024-07-26 NOTE — PROGRESS NOTES
[facility]  part of [facility]    Progress Note    Anita Bingham Patient Status:  Outpatient in a Bed    1960 MRN [id number]   Location [facility] Attending Gavin Walter MD   Hosp Day # 0 PCP Armando Norman MD       Subjective:   Anita Bingham is a(n) 64 year old female is doing well although ws lightheaded last night and is getting a bolus currently.     Objective:   Vital Signs:  Blood pressure (!) 82/50, pulse 70, temperature 98.1 °F (36.7 °C), temperature source Oral, resp. rate 16, height 5' 5\" (1.651 m), weight 137 lb (62.1 kg), last menstrual period 2008, SpO2 91%, not currently breastfeeding.     General: No acute distress. Alert and oriented x 3.  HEENT: Moist mucous membranes.   Neck: No lymphadenopathy.  No JVD. No carotid bruits.  Respiratory: Clear to auscultation bilaterally.  No wheezes. No rhonchi.  Cardiovascular: S1, S2.  Regular rate and rhythm.  No murmurs. Equal pulses   Abdomen: Soft, nontender, nondistended.  Positive bowel sounds. No rebound tenderness  Neurologic: No focal neurological deficits.  Musculoskeletal: Full range of motion of all extremities.  No swelling noted.  Integument: No lesions. No erythema.  Psychiatric: Appropriate mood and affect.      Assessment and Plan:     Cervical spinal stenosis  S/P Cervical Fusion POD#1      Hypertension, essential  Now hypotensive receiving a bolus currently      Hypercholesterolemia  Same    IVF  Follow BP  C-spine films pending  PT/OT  Pain Rx  Possibly home today        Results:     Lab Results   Component Value Date    WBC 10.5 2024    HGB 12.9 2024    HCT 38.6 2024    .0 2024    CREATSERUM 0.81 07/10/2024    BUN 22 07/10/2024     07/10/2024    K 3.7 07/10/2024     (H) 07/10/2024    CO2 23.0 07/10/2024     (H) 07/10/2024    CA 9.6 07/10/2024    ALB 4.6 07/10/2024    ALKPHO 61 07/10/2024    BILT 0.5 07/10/2024    TP 6.9 07/10/2024    AST 15  07/10/2024    ALT 17 07/10/2024    PTT 27.6 07/10/2024    INR 0.96 07/10/2024    TSH 1.267 12/22/2023     05/20/2019       XR FLUOROSCOPY C-ARM TIME LESS THAN 1 HOUR (CPT=76000)    Result Date: 7/25/2024  CONCLUSION:  Intraoperative fluoroscopy utilized for anterior cervical fixation at C5-C7.    Dictated by (CST): Ilir Ravi MD on 7/25/2024 at 3:49 PM     Finalized by (CST): Ilir Ravi MD on 7/25/2024 at 3:51 PM         EKG 12 Lead    Result Date: 7/26/2024  Normal sinus rhythm Right bundle branch block Abnormal ECG When compared with ECG of 10-JUL-2024 10:19, No significant change was found Confirmed by RHYS CARREON, DAVID ([id number]) on 7/26/2024 7:49:18 AM             Duarte Craig DO  7/26/2024

## 2024-07-26 NOTE — DISCHARGE INSTRUCTIONS
Discharge Instructions Dr. Gavin Walter MD  ANTERIOR CERVICAL DISCECTOMY AND FUSION (ACDF),  POSTERIOR CERVICAL FORAMINOTOMY,  POSTERIOR CERVICAL FUSION,  CERVICAL DISC REPLACEMENT (CDR)    Wound Care  Do not change or remove your initial postoperative dressing for the first 2 days after surgery   unless instructed to do so by Dr. Walter's staff or if it is saturated by drainage. If removed,   please reapply a clean dry dressing over the incision. You may have thin adhesive paper strips   on your incision after surgery--please do NOT remove them. They will be removed at your 2-  week postoperative appointment. You may remove your dressing and shower on the third day   after surgery. Let warm soapy water run over the incisional area. Do not scrub the area.   Gently pat the incisional area completely dry. You may reapply a clean dry dressing over the   incision to prevent irritation from clothing, but this is not required after post op day 3. You are   not allowed to soak your incision in a bathtub, hot tub, or swimming pool until the incision is   completely healed and Dr. Walter or his staff permit these activities. Do not apply antibiotic   gels, ointments (Neosporin or bacitracin), lotions, peroxide or iodine solutions on or around the   incision.  You may have some swelling and/or clear yellow drainage around your incisional site. This is   normal and may take several weeks to go away. Please leave any superficial scabs alone and let   them fall off on their own.    Immediate Follow Up  If you notice incisional redness or neck swelling, change in color to any drainage, malodor,   significant increased pain and/or a fever greater than 101.5, you should call our office. If it is   after business hours you should present to the closest emergency room. If you have shortness   of breath, chest pain, significant stomach pain and bloating without a bowel movement,   complete loss of bowel or bladder function please  contact our office and present to your local   emergency room as soon as possible.    Post-Operative Instructions - Cervical  After surgery you may experience pain in or around the region of the incision. Some neck and   arm pain as well as tingling or numbness may also be present. Initially it may be of greater   intensity than before surgery but will usually subside over time as the healing process occurs.   This discomfort is caused from surgical retraction of tissue as well as inflammation and swelling   of previously compressed nerves.  If you have had a cervical fusion, swallowing may be difficult for a while. This is from   manipulation of tissue and the presence of the breathing tube for anesthesia. The sore throat   will usually subside within a week. The swallowing difficulty may take slightly longer. Using   throat lozenges, sipping cool liquids, or sucking ice chips may soothe this pain. Initially drink   liquids and eat soft foods and progress as tolerated to well-cooked foods that are cut finely.  You may be given a hard and/or soft cervical collar. Please use the collar as instructed by Dr. Walter. The collar should fit snuggly. Avoid any overhead activities.  Early activity after surgery is extremely important to help prevent the complications such as   pneumonia and blood clots. Activity also promotes recovery, relieves muscle stiffness, allows   for development of a well-organized scar, and improves your outcome. Your recovery is an   essential part of the surgical process so please follow the guidelines below to return to your   desired activities as safely as possible.     Week 1  ? Try to get at least 8 hours of sleep each night. A disrupted sleep pattern is common   after discharge from the hospital and will return to normal over time.  ? Avoid bending and twisting at the neck and practice good posture.   ? If you have a neck brace or collar, please wear it when you are up walking or sitting    unless otherwise instructed. You do not need to wear it to bed.   ? No lifting of anything.  ? You may not drive, but you may be driven.  ? Begin a daily walking program with 1-2 blocks initially; schedule a daily time and   increase distance daily. Ideally, we would like you to be up and walking 15   minutes/hour.   ? Eat a balanced high-fiber diet and drink plenty of water.  ? Take medications as prescribed, using narcotics and muscle relaxants only as needed.  ? Do not restart any NSAIDs such as Advil, Motrin, Aleve, ibuprofen, naproxen, diclofenac,   meloxicam or celecoxib for at least 3 months after your fusion surgery.   ? Take stool softeners to help with bowel movements.    Week 2  ? Resume normal rising and retiring schedule but continue to rest throughout the day as   needed.  ? You may not drive, but you may be driven.  ? No lifting of anything weighing more than 10 to 15 pounds.  ? Continue scheduled walking, increasing distance and frequency as tolerated.  ? May resume sexual relations when comfortable between 2 to 4 weeks after surgery.  Please be advised the patient must be in the top position until 6 weeks after surgery.   ? Begin weaning from narcotic pain medications if you have not already.  ? Follow-up in the office as scheduled for further instructions.     Disability  The usual period of recovery for cervical spine surgery is 8 to 12 weeks and complete healing   may take from 6 to 9 months. Some patients may return to work sooner than others   depending on their job, response to surgery, and ability to perform other lighter tasks in the   workplace. Physician approval is required prior to returning to work.    Post-Operative Pain Medication Policy  It is Dr. Walter's aim to make you as comfortable as possible after surgery. We realize pain   management is not perfect, and that you will have some discomfort after your operation.   There are several factors that limit our ability to completely  eliminate pain after surgery. This   first is that pain medications have side effects. Please be advised that high doses or continued   use of narcotic medications may put you at risk for serious health issues including but not   limited to respiratory depression (decreased ability to breathe normally), hypotension (low   blood pressure), nausea and constipation, generalized itching, urinary retention (inability to   urinate) and abdominal distention. Dr. Walter will prescribe pain medication for 2-3 weeks after   surgery and may refer you out to a pain management specialist for any narcotic medication   requested after this period of time.    Preventing Opioid Addiction  [facility] is committed to protecting our patients from Opioid addiction.   We only prescribe opioids when necessary. Whenever possible, we use less-addictive pain   medication and alternative pain management options. Both high-dosage opioids and lowdosage opioids taken over long periods of time can increase the risk of addiction. We attempt to limit our prescriptions of opioids after surgery as much as possible, which may include lower   dosages of opioid medications or fewer pills. If you have additional questions about Opioids   and their dependency, please contact our clinic.    Contact Information  Please contact Dr. Jose Angel Oh’s , at [phone] with any   questions or concerns pertaining to scheduling or other administrative issues.  Please contact Dr. Jose Angel Rees PA-C’s physician assistant, at [phone] with   any medical questions or concerns.     AFTER HOURS EMERGENCY CONTACT: Please dial [phone] and press “0”  for the  to connect you to the orthopaedic fellow or resident on call if you have any   urgent questions or concerns after regular business hours. If you do not hear back from the oncall physician in a timely matter and your urgent matter turns emergent, you  should present to   your local emergency room. Please follow-up with Madeline Verma PA-C the following business   day with an update if you do have to contact the on-call physician or present to your local   emergency room after surgery.     Future Dental Appointments (Applies to fusion and CDR surgery only)  (3 Months following surgery): Prior to going to the dentist, please notify Dr. Walter and let your   Dentist know that you had a spinal fusion surgery. Have the treating physician prescribe   antibiotics prior to the procedure. The appropriate medications and guidelines are as follows:  ? Amoxicillin 2 grams 1 hour prior to procedure. If allergic to Penicillin, Clindamycin 600   mg, 1 hour prior to procedure. No second doses are required following the dental   procedure.

## 2024-09-04 ENCOUNTER — OFFICE VISIT (OUTPATIENT)
Dept: INTERNAL MEDICINE CLINIC | Facility: CLINIC | Age: 64
End: 2024-09-04

## 2024-09-04 VITALS
SYSTOLIC BLOOD PRESSURE: 122 MMHG | DIASTOLIC BLOOD PRESSURE: 72 MMHG | TEMPERATURE: 98 F | HEART RATE: 75 BPM | HEIGHT: 65 IN | BODY MASS INDEX: 23.16 KG/M2 | WEIGHT: 139 LBS | OXYGEN SATURATION: 98 %

## 2024-09-04 DIAGNOSIS — F17.200 SMOKER: ICD-10-CM

## 2024-09-04 DIAGNOSIS — I10 HYPERTENSION, ESSENTIAL: ICD-10-CM

## 2024-09-04 DIAGNOSIS — J44.1 CHRONIC OBSTRUCTIVE PULMONARY DISEASE WITH ACUTE EXACERBATION (HCC): Primary | ICD-10-CM

## 2024-09-04 DIAGNOSIS — E78.00 HYPERCHOLESTEROLEMIA: ICD-10-CM

## 2024-09-04 PROCEDURE — 99214 OFFICE O/P EST MOD 30 MIN: CPT | Performed by: INTERNAL MEDICINE

## 2024-09-04 PROCEDURE — 3078F DIAST BP <80 MM HG: CPT | Performed by: INTERNAL MEDICINE

## 2024-09-04 PROCEDURE — 3074F SYST BP LT 130 MM HG: CPT | Performed by: INTERNAL MEDICINE

## 2024-09-04 PROCEDURE — 3008F BODY MASS INDEX DOCD: CPT | Performed by: INTERNAL MEDICINE

## 2024-09-04 RX ORDER — BENZONATATE 100 MG/1
CAPSULE ORAL
COMMUNITY
Start: 2024-08-25

## 2024-09-04 RX ORDER — VARENICLINE TARTRATE 1 MG/1
1 TABLET, FILM COATED ORAL 2 TIMES DAILY
Qty: 60 TABLET | Refills: 1 | Status: SHIPPED | OUTPATIENT
Start: 2024-09-04

## 2024-09-04 RX ORDER — PREDNISONE 10 MG/1
TABLET ORAL
COMMUNITY
Start: 2024-08-25

## 2024-09-04 RX ORDER — LEVOFLOXACIN 500 MG/1
500 TABLET, FILM COATED ORAL DAILY
Qty: 10 TABLET | Refills: 0 | Status: SHIPPED | OUTPATIENT
Start: 2024-09-04

## 2024-09-04 RX ORDER — PREDNISONE 10 MG/1
TABLET ORAL
Qty: 30 TABLET | Refills: 0 | Status: SHIPPED | OUTPATIENT
Start: 2024-09-04

## 2024-09-04 RX ORDER — VARENICLINE TARTRATE 0.5 (11)-1
1 KIT ORAL AS DIRECTED
Qty: 53 EACH | Refills: 0 | Status: SHIPPED | OUTPATIENT
Start: 2024-09-04

## 2024-09-04 RX ORDER — FLUTICASONE FUROATE, UMECLIDINIUM BROMIDE AND VILANTEROL TRIFENATATE 100; 62.5; 25 UG/1; UG/1; UG/1
1 POWDER RESPIRATORY (INHALATION) DAILY
Qty: 28 EACH | Refills: 5 | Status: SHIPPED | OUTPATIENT
Start: 2024-09-04

## 2024-09-04 NOTE — PROGRESS NOTES
Anita Bingham is a 64 year old female.    HPI:     Chief Complaint   Patient presents with    Bronchitis     Pt here due to bronchitis follow up states she has not felt any better since urgent care x 1 week ago , symptoms started x 2 weeks ago including a lot of coughing and fatigue.        65 y/o F with COPD with 3 week h/o cough and +dyspnea; occasional sputum; +yellow sputum; no sinus or nasal congestion; s/p cervical spine fusion on 7/25/24 by Orthopedics Dr Walter at [place]; seen at urgent care on 8/25/24 in [place]; was given doxycycline 100 mg po BID x 10d, prednisone 30 mg po every day x 3d, 20 mgpo every day x 3d, 10 mg po every day x 3d, benzonatate 100 mg po TID prn and albuterol MDI 2 p QID prn; slight improvement with cough;       HISTORY:  Past Medical History:    Atypical ductal hyperplasia of right breast    Needle localization R breast biopsy Dr Bahena at [facility]. path-atypical ductal hyperplasia, flat epithelial atypia, atypical lobular hyperplasia.  Rx E [place] per Dr. Bahena    Back problem    neck and shoulder    BBB (bundle branch block)    Right BBB    Bilateral renal artery stenosis (HCC)    Bilateral renal artery stents-12/29/17-Dr. Wong. Left renal subcapsular hematoma post procedure. BETTE related to atherosclerosis.     Cervical arthritis    MRI C-spine-11/4/19-- disc degeneration, DJD, multiple perineural nerve root sleeve cysts.    COVID-19    Outpatient    Dilated cardiomyopathy (HCC)    cardiac cath Dr Nuñez 8/10/17 -Coronary arteries normal. Mild global LV hypokinesis. Estimated EF 40-45%    DJD (degenerative joint disease) of cervical spine    MRI cervical spine 11/4/19-degenerative disc disease, DJD, multiple perineural nerve root sleeve cysts    Essential hypertension    H/O colonoscopy    Colonoscopy Dr. Card 6/26/08-hyperplastic polyp and internal hemorrhoids.    Hematoma of left kidney    left kidney subcapsular hematoma--complication of renal artery stenting procedure     High blood pressure    High cholesterol    IBS    Lipid screening    Per [employer]    Muscle weakness    arms due to neck bulging disc    Musculoskeletal disorder    left foot bunion (surgery)    Numbness and tingling of left arm and leg    MRI brain-19-3 x 2 mm pituitary nodule c/w incidental microadenoma. no CVA or tumor    Osteopenia    DEXA per GYN at office    Pituitary microadenoma (HCC)    MRI brain 19--3 x 2 mm incidental pituitary microadenoma    PVC's (premature ventricular contractions)    Frequent PVCs on EKG 17    Radial artery thrombosis, right (HCC)    Right radial artery thrombosis post cardiac cath 2017.  Heparin-then Eliquis until 2017.    Renal disorder    kidney blockage stent placement    Vitamin D deficiency      Past Surgical History:   Procedure Laterality Date    Breast biopsy Right 2012    R needle loc breast bx Dr Bahena at [facility]    Carpal tunnel release Right 2021    Dr Russell    Cath renal stent Bilateral 2017    Bilateral renal artery stents Dr. Wong at [facility].    Colonoscopy  2008    Colonoscopy Dr. Card-hyperplastic polyp and internal hemorrhoids.    D & c   &     missed ab    Elbow surgery Right 2020    Arthroscopy R elbow with tendon repair --Dr Getachew Russell at [place].     Foot surgery Left      L foot bunion surgery x2    Kidney surgery            Other surgical history  12, 17, 2020      Family History   Problem Relation Age of Onset    Hypertension Father     Pulmonary Disease Father         smoker    Lipids Father     Arthritis Father     Dementia Father     Heart Disease Father          age 88    Other (cancer bladder) Father 77    Hypertension Mother     Diabetes Mother     Dementia Mother 90    Other ( age 94 after hip fracture) Mother     Hypertension Other     Other (Other) Brother         L & W    Other (Other) Brother         L & W    Other (5 daughters)  Daughter     Other (2 sons) Son         1 son with Aspergers    Other (ASD) Daughter 36        robotic repair    Other (cancer thyroid) Daughter 40      Social History:   Social History     Socioeconomic History    Marital status:    Occupational History    Occupation:    Tobacco Use    Smoking status: Every Day     Current packs/day: 0.75     Average packs/day: 0.8 packs/day for 46.7 years (35.0 ttl pk-yrs)     Types: Cigarettes     Start date: 1/1/1978     Passive exposure: Never    Smokeless tobacco: Never    Tobacco comments:     N/A   Vaping Use    Vaping status: Never Used   Substance and Sexual Activity    Alcohol use: Yes     Alcohol/week: 1.0 standard drink of alcohol     Types: 1 Glasses of wine per week     Comment: 1 glass wine per week    Drug use: No    Sexual activity: Yes     Partners: Male     Comment: postmenopausal 11/18/19 current   Other Topics Concern    Caffeine Concern Yes     Comment: Coffee, 2-3 cups daily.    Exercise Yes     Comment: few times weekly   Social History Narrative    The patient does not use an assistive device..      The patient does live in a home with stairs.     Social Determinants of Health      [facility]        Medications (Active prior to today's visit):  Current Outpatient Medications   Medication Sig Dispense Refill    benzonatate 100 MG Oral Cap TAKE 1 CAPSULE BY MOUTH THREE TIMES DAILY FOR 10 DAYS AS NEEDED FOR COUGH      predniSONE 10 MG Oral Tab       levoFLOXacin 500 MG Oral Tab Take 1 tablet (500 mg total) by mouth daily. 10 tablet 0    fluticasone-umeclidin-vilant (TRELEGY ELLIPTA) 100-62.5-25 MCG/ACT Inhalation Aerosol Powder, Breath Activated Inhale 1 puff into the lungs daily. 28 each 5    predniSONE 10 MG Oral Tab 40 mg po qD x3d, 30 mg po qD x3d, 20 mg po qD x3d, and 10 mg po qD x3d 30 tablet 0    Varenicline Tartrate, Starter, (CHANTIX STARTING MONTH PAK) 0.5 MG X 11 & 1 MG X 42 Oral Tablet  Therapy Pack Take 1 tablet by mouth As Directed. 53 each 0    varenicline (CHANTIX CONTINUING MONTH JOSIE) 1 MG Oral Tab Take 1 tablet (1 mg total) by mouth 2 (two) times daily. 60 tablet 1    diazePAM 2 MG Oral Tab Take 1 tablet (2 mg total) by mouth every 6 (six) hours as needed (to be given if spasms not relieved by tizanidine).      oxyCODONE 5 MG Oral Tab Take 0.5 tablets (2.5 mg total) by mouth every 4 (four) hours as needed.      tiZANidine 2 MG Oral Tab Take 1 tablet (2 mg total) by mouth every 8 (eight) hours as needed.      Naloxone HCl 4 MG/0.1ML Nasal Liquid 4 mg by Nasal route as needed. If patient remains unresponsive, repeat dose in other nostril 2-5 minutes after first dose. 1 kit 0    metoprolol succinate ER 25 MG Oral Tablet 24 Hr Take 0.5 tablets (12.5 mg total) by mouth daily.      rosuvastatin 5 MG Oral Tab Take 1 tablet (5 mg total) by mouth nightly.      albuterol (PROAIR HFA) 108 (90 Base) MCG/ACT Inhalation Aero Soln Inhale 2 puffs into the lungs every 6 (six) hours as needed for Wheezing. 3 each 3       Allergies:  No Known Allergies              ROS:   Constitutional: no weight loss; no fatigue  Cardiovascular:  Negative for chest pain; negative palpitations  Respiratory:  Negative for cough, dyspnea and wheezing  Gastrointestinal:  Negative for abdominal pain, constipation, decreased appetite, diarrhea and vomiting; no melena or hematochezia  All other review of systems are negative.        PHYSICAL EXAM:   Blood pressure 122/72, pulse 75, temperature 97.8 °F (36.6 °C), height 5' 5\" (1.651 m), weight 139 lb (63 kg), last menstrual period 12/31/2008, SpO2 98%, not currently breastfeeding.  Constitutional: alert and oriented x3 in no acute distress  HEENT- EOMI, PERRL  Nose/Mouth/Throat: pharynx without erythema; no oral lesions  Neck/Thyroid: neck supple; no thyromegaly  Cardiovascular: RRR, S1, S2, no S3 or murmur  Respiratory: lungs without crackles or wheezes  Abdomen: normoactive bowel  sounds, soft, non-tender and non-distended  Extremities: no clubbing, cyanosis or edema           ASSESSMENT/PLAN:   COPD with exacerbation  - on past CT--hx of using flovent inhaler 44 mcg 2 p bid.  Albuterol HFA 2 p QID prn  -now 3 week h/o +cough with sputum; s/p doxycycline 100 mg po BID x 10d, prednisone taper, benzonatate 100 mg po TID prn given 8/25/24  -PFT ordered  -check CXR  -Levaquin 500 mg po qD #10  -Trelegy one puff daily  -prednisone 40 mg po qD x3d, 30 mg po qD x3d, 20 mg po qD x3d, and 10 mg po qD x3d  -discussed need for smoking cessation    Smoker  - previously tried Wellbutrin  mg daily. Pt trying to cut back and quit.  - smokes one-half pack per day  -Chantix starter pack followed by Chantix 1 mg po BID; discussed risks of depression with Rx    L cervical radiculopathy  -IDA 2020-Dr Timmons --L C7-T1 interlaminar YUNIOR for L C7 radiculopathy.    MRI cervical spine 10/27/20 per Dr Timmons--DDD DJD.   -Refractory to conservative management  - Following with Dr. Lawler for which there may be plans for surgical intervention  - May need cardiac clearance with Dr. Nuñez  - Will hold on lab test until we confirmed surgical date.     Anxiety and depression  Was on wellbutrin  mg daily, though no longer taking;      Chest pain, unspecified type  15 min episode of L upper chest pain during the night this wk. Atypical.   EKG today --SR, RBBB--no change to EKG of 12/26/17.  Check CXR, stress echo.  -Most recent echo stress test showed normal stress test.    - Chest pain seems to have resolved     Hypertension-(hx renal artery stents)   -on toprol XL 12.5 daily per Dr Nuñez; she is not taking prescribed lisinopril 5 mg daily.     Hypercholesterolemia  -crestor 5 mg daily, coenzyme Q. Check lipids. atorvastatin caused leg pain 2018.  - Lipid panel borderline elevated      Aortic regurgitation  -Saw Dr Nuñez 8/22/22--he did echo 8/8/22--EF 60% mild to mod AR. He cont. lisinopril 5 mg daily. To  see him at 1 yr.      Abnormal CT of the chest  CT chest--12/30/22--2 cm consolidative airspace opacity in lingula suggestive of scar or atelectasis new from 2018 CT, emphysema, coronary artery calcifications.   Radiologist recom repeat CT in 6 mo. (copy to Dr Norman).  - CT CHEST most recently showed stable 4 mm nodule.  There was clearance of the lingular consolidation, moderate emphysema noted  - Consider repeat CT scan for later this year     Hx Numbness L hand L foot   -resolved- Oct 2019--Unclear etiology   MRI brain-11/4/19-3 x 2 mm pituitary nodule c/w incidental microadenoma. No CVA or tumor.  Very small size of pituitary microadenoma likely to be nonfunctioning.    11/22/19-CMP, lipid, prolactin-results ok. Was to see Dr. Pena.      Renal artery stenosis--s/p bilat renal artery stents 12/29/17  -Doing well.   Renal art doppler 12/6/19-patent right and left bilateral renal arterial stents.     Cardiomyopathy-resolved.  - cardiac cath 8/10/17-Coronaries nl. Mild global LV hypokinesis  -Cardiac work-up as above     Irritable bowel syndrome  -for yrs. Bentyl not helpful. Avoid lactose, try probiotic.    11/10/17--tissue TG IgA Ab--nl. Colonoscopy Dr Card 6/16/18-int hemorrhoids.      Hx atypical ductal hyperplasia R breast bx   11/2012 Dr. Bahena--she Rx evista 60 mg daily-Per last visit with Dr. Zhang, recom hold the Evista in view of hx radial artery thrombosis.     Trace edema  Bilateral lower extremities  - Conservative management with compression stockings  - Can consider Lasix in the future     Vitamin D deficiency  -vitamin D 2000 units daily. Vit D level 43.2, at goal     Healthcare maintenance:  Vaccines- tdap 7/25/17. Flu shot utd . Pneumovax 12/26/18 (emphysema on CT 9/2018). shingrix avail at pharmacy. Covid vaccine--declined..   Gyn Dr Gabe Du retired in 2021--now sees Dr Herrera..   Mammo-per gyn- [facility]--now is ord by Dr Herrera.    DEXA-22--osteopenia.  Colonoscopy Dr Card 18--int hemorrhoids.         Spent 30 minutes obtaining history, evaluating patient, discussing treatment options, diet, exercise, review of available labs and radiology reports, and completing documentation.            Orders This Visit:  No orders of the defined types were placed in this encounter.      Meds This Visit:  Requested Prescriptions     Signed Prescriptions Disp Refills    levoFLOXacin 500 MG Oral Tab 10 tablet 0     Sig: Take 1 tablet (500 mg total) by mouth daily.    fluticasone-umeclidin-vilant (TRELEGY ELLIPTA) 100-62.5-25 MCG/ACT Inhalation Aerosol Powder, Breath Activated 28 each 5     Sig: Inhale 1 puff into the lungs daily.    predniSONE 10 MG Oral Tab 30 tablet 0     Si mg po qD x3d, 30 mg po qD x3d, 20 mg po qD x3d, and 10 mg po qD x3d    Varenicline Tartrate, Starter, (CHANTIX STARTING MONTH JOSIE) 0.5 MG X 11 & 1 MG X 42 Oral Tablet Therapy Pack 53 each 0     Sig: Take 1 tablet by mouth As Directed.    varenicline (CHANTIX CONTINUING MONTH JOSIE) 1 MG Oral Tab 60 tablet 1     Sig: Take 1 tablet (1 mg total) by mouth 2 (two) times daily.       Imaging & Referrals:  XR CHEST PA + LAT CHEST (CPT=71046)     2024  Jasvir Higgins MD

## 2024-09-24 NOTE — TELEPHONE ENCOUNTER
[phone]  I called pt. Discussed w pt MRI results. 11/4/19–MRI brain–3 x 2 mm pituitary nodule c/w incidental of microadenoma. No CVA or tumor. 11/4/19-- MRI C-spine– disc degeneration, DJD, multiple perineural nerve root sleeve cysts.     I recomm Requested Prescriptions   Pending Prescriptions Disp Refills    alendronate (FOSAMAX) 70 MG tablet 12 tablet 1     Sig: Take 1 tablet (70 mg) by mouth every 7 days. Take 60 minutes before am meal with 8 oz. water. Remain upright for 30 minutes.       Bisphosphonates Failed - 9/24/2024 11:47 AM        Failed - Dexa scan completed in the past 48-months     Please review last Dexa result.           Passed - Medication is active on med list        Passed - Medication indicated for associated diagnosis     The medication is prescribed for one or more of the following conditions:     Osteoporosis   Osteitis Deformans (Paget's Disease)   Postmenopausal    Osteopenia   Arthroplasty   Crohn's Disease   Cystic Fibrosis   Fibrous Dysplasia of bone   Growth Hormone Deficiency   Hypercalcemia   Juvenile Osteoporosis   Hypogonadism          Passed - Recent (12 mo) or future (90 days) visit within the authorizing provider's specialty     The patient must have completed an in-person or virtual visit within the past 12 months or has a future visit scheduled within the next 90 days with the authorizing provider s specialty.  Urgent care and e-visits do not quality as an office visit for this protocol.          Passed - Most recent Creatinine Clearance in last 12 months >35        Passed - Patient is age 18 or older

## 2024-11-04 RX ORDER — BUPROPION HYDROCHLORIDE 300 MG/1
300 TABLET ORAL DAILY
Qty: 90 TABLET | Refills: 3 | OUTPATIENT
Start: 2024-11-04

## 2024-11-04 NOTE — TELEPHONE ENCOUNTER
Marked as discontinued     Current refill request refused due to refill is either a duplicate request or has active refills at the pharmacy.  Check previous templates.    Requested Prescriptions     Refused Prescriptions Disp Refills    BUPROPION  MG Oral Tablet 24 Hr [Pharmacy Med Name: BUPROPION XL 300MG TABLETS] 90 tablet 3     Sig: TAKE 1 TABLET(300 MG) BY MOUTH DAILY     Refused By: AKBAR ALMAZAN     Reason for Refusal: Refill not appropriate

## 2024-12-30 RX ORDER — ALBUTEROL SULFATE 90 UG/1
2 INHALANT RESPIRATORY (INHALATION) EVERY 6 HOURS PRN
Qty: 6.7 G | Refills: 0 | OUTPATIENT
Start: 2024-12-30

## 2024-12-30 NOTE — TELEPHONE ENCOUNTER
1 year was sent in June     Current refill request refused due to refill is either a duplicate request or has active refills at the pharmacy.  Check previous templates.    Requested Prescriptions     Refused Prescriptions Disp Refills    ALBUTEROL 108 (90 Base) MCG/ACT Inhalation Aero Soln [Pharmacy Med Name: ALBUTEROL HFA INH (200 PUFFS) 6.7GM] 6.7 g 0     Sig: INHALE 2 PUFFS BY MOUTH INTO THE LUNGS EVERY 6 HOURS AS NEEDED FOR WHEEZING     Refused By: AKBAR ALMAZAN     Reason for Refusal: Patient has requested refill too soon

## 2025-02-02 NOTE — PROGRESS NOTES
Anita Bingham is a 64 year old female.    HPI:     Chief Complaint   Patient presents with    Follow - Up     4 month f/u      Ms. BINGHAM is a 64 year old female PMHX hypertension, hyperlipidemia, COPD, cervical radiculopathy, IBS, history of atypical ductal hyperplasia, coming in for follow-up.    Neck surgery went well. Doing PT exercises at home. Following with Dr. Walter of [facility] spine    1 month ago without inciting injury. Out of the blue some flank pain.     In the last 6 months, most days she feels as if she has a bloating sensation. She is concerned about it. No problems with urinary symptoms. BMs are ok. Every day is different. Always there. No aggravating.     Has tried Gas-X multiple times. She doesn't really eat that much. 1x e very 2 months. No nausea or vomiting. No fevers, chills.     She does feel like things can get stuck in the throat.     She works very stressful job. This can add to her aches and pains.  She takes vitamin D3, Zinc, magneisum.    Father with bladder cancer and was treated.     Walking a lot but not too much exercise. 10,000 steps a day.     She did have a friend who passed from Ovarian cancer.    HISTORY:  Past Medical History:    Atypical ductal hyperplasia of right breast    Needle localization R breast biopsy Dr Bahena at [facility]. path-atypical ductal hyperplasia, flat epithelial atypia, atypical lobular hyperplasia.  Rx E [place] per Dr. Bahena    Back problem    neck and shoulder    BBB (bundle branch block)    Right BBB    Bilateral renal artery stenosis    Bilateral renal artery stents-12/29/17-Dr. Wong. Left renal subcapsular hematoma post procedure. BETTE related to atherosclerosis.     Cervical arthritis    MRI C-spine-11/4/19-- disc degeneration, DJD, multiple perineural nerve root sleeve cysts.    COVID-19    Outpatient    Dilated cardiomyopathy (HCC)    cardiac cath Dr Nuñez 8/10/17 -Coronary arteries normal. Mild global LV hypokinesis. Estimated EF 40-45%    DJD  (degenerative joint disease) of cervical spine    MRI cervical spine 19-degenerative disc disease, DJD, multiple perineural nerve root sleeve cysts    Essential hypertension    H/O colonoscopy    Colonoscopy Dr. Card 08-hyperplastic polyp and internal hemorrhoids.    Hematoma of left kidney    left kidney subcapsular hematoma--complication of renal artery stenting procedure    High blood pressure    High cholesterol    IBS    Lipid screening    Per [employer]    Muscle weakness    arms due to neck bulging disc    Musculoskeletal disorder    left foot bunion (surgery)    Numbness and tingling of left arm and leg    MRI brain-19-3 x 2 mm pituitary nodule c/w incidental microadenoma. no CVA or tumor    Osteopenia    DEXA per GYN at office    Pituitary microadenoma (HCC)    MRI brain 19--3 x 2 mm incidental pituitary microadenoma    PVC's (premature ventricular contractions)    Frequent PVCs on EKG 17    Radial artery thrombosis, right (HCC)    Right radial artery thrombosis post cardiac cath 2017.  Heparin-then Eliquis until 2017.    Renal disorder    kidney blockage stent placement    Vitamin D deficiency      Past Surgical History:   Procedure Laterality Date    Breast biopsy Right 2012    R needle loc breast bx Dr Bahena at [facility]    Carpal tunnel release Right 2021    Dr Russell    Cath renal stent Bilateral 2017    Bilateral renal artery stents Dr. Wong at [facility].    Colonoscopy  2008    Colonoscopy Dr. Card-hyperplastic polyp and internal hemorrhoids.    D & c   &     missed ab    Elbow surgery Right 2020    Arthroscopy R elbow with tendon repair --Dr Getachew Russell at [facility].     Foot surgery Left      L foot bunion surgery x2    Kidney surgery            Other surgical history  12, 17, 2020      Family History   Problem Relation Age of Onset    Hypertension Father     Pulmonary Disease Father          smoker    Lipids Father     Arthritis Father     Dementia Father     Heart Disease Father          age 88    Other (cancer bladder) Father 77    Hypertension Mother     Diabetes Mother     Dementia Mother 90    Other ( age 94 after hip fracture) Mother     Hypertension Other     Other (Other) Brother         L & W    Other (Other) Brother         L & W    Other (5 daughters) Daughter     Other (2 sons) Son         1 son with Aspergers    Other (ASD) Daughter 36        robotic repair    Other (cancer thyroid) Daughter 40      Social History:   Social History     Socioeconomic History    Marital status:    Occupational History    Occupation:    Tobacco Use    Smoking status: Every Day     Current packs/day: 0.75     Average packs/day: 0.8 packs/day for 47.1 years (35.3 ttl pk-yrs)     Types: Cigarettes     Start date: 1978     Passive exposure: Never    Smokeless tobacco: Never    Tobacco comments:     N/A   Vaping Use    Vaping status: Never Used   Substance and Sexual Activity    Alcohol use: Yes     Alcohol/week: 1.0 standard drink of alcohol     Types: 1 Glasses of wine per week     Comment: 1 glass wine per week    Drug use: No    Sexual activity: Yes     Partners: Male     Comment: postmenopausal 17 current   Other Topics Concern    Caffeine Concern Yes     Comment: Coffee, 2-3 cups daily.    Exercise Yes     Comment: few times weekly   Social History Narrative    The patient does not use an assistive device..      The patient does live in a home with stairs.     Social Drivers of Health      Received from [employer]    [facility]        Medications (Active prior to today's visit):  Current Outpatient Medications   Medication Sig Dispense Refill    fluticasone-umeclidin-vilant (TRELEGY ELLIPTA) 100-62.5-25 MCG/ACT Inhalation Aerosol Powder, Breath Activated Inhale 1 puff into the lungs daily. 28 each 5    albuterol (PROAIR HFA) 108 (90 Base) MCG/ACT Inhalation  Aero Soln Inhale 2 puffs into the lungs every 6 (six) hours as needed for Wheezing. 3 each 3       Allergies:  No Known Allergies      ROS:   Positive Findings indicated in BOLD    Constitutional: Fever, Chills, Weight Gain, Weight Loss, Night Sweats, Fatigue, Malaise  ENT/Mouth:  Hearing Changes, Ear Pain, Nasal Congestion, Sinus Pain, Hoarseness, Sore throat, Rhinorrhea, Swallowing Difficulty  Eyes: Eye Pain, Swelling, Redness, Foreign Body, Discharge, Vision Changes  Cardiovascular: Chest Pain, SOB, PND, Dyspnea on Exertion, Orthopnea, Claudication, Edema, Palpitations  Respiratory: Cough, Sputum, Wheezing, Shortness of breath  Gastrointestinal: Nausea, Vomiting, Diarrhea, Constipation, Pain, Heartburn, Dysphagia, Bloody stools, Tarry stools  Genitourinary: Dysmenorrhea, Dysuria, Urinary Frequency, Hematuria, Urinary Incontinence, Urgency,  Flank Pain  Musculoskeletal: Arthralgias, Myalgias, Joint Swelling, Joint Stiffness, Back Pain, Neck Pain  Integumentary: Skin Lesions, Pruritis, Hair Changes, Jaundice, Nail changes  Neuro: Weakness, Numbness, Paresthesias, Loss of Consciousness, Syncope, Dizziness, Headache, Falls  Psych: Anxiety, Depression, Insomnia, Suicidal Ideation, Homicidal ideation, Memory Changes  Heme/Lymph: Bruising, Bleeding, Lymphadenopathy  Endocrine: Polyuria, Polydipsia, Temperature Intolerance    PHYSICAL EXAM:   Vital Signs:  Blood pressure 160/80, pulse 92, temperature 97.6 °F (36.4 °C), height 5' 5\" (1.651 m), weight 138 lb 6.4 oz (62.8 kg), last menstrual period 12/31/2008, SpO2 96%, not currently breastfeeding.     Constitutional: No acute distress. Alert and oriented x 3.  Eyes: EOMI, PERRLA, clear sclera b/l  HENT: NCAT, Moist mucous membranes, Oropharynx without erythema or exudates  Neck: No JVD, no thyromegaly; +mild restricted range of motion with neck flexion  Cardiovascular: S1, S2, no S3, no S4, Regular rate and rhythm, No murmurs/gallops/rubs.   Vascular: Equal pulses 2+  carotids no bruits or thrills/radial/DP/PT bilaterally  Respiratory: Clear to auscultation bilaterally.  No wheezes/rales/rhonchi  Gastrointestinal: Soft, nontender, nondistended. Positive bowel sounds x 4. No rebound tenderness. No hepatomegaly, No splenomegaly  Genitourinary: No CVA tenderness bilaterally  Neurologic: No focal neurological deficits, CN II-XII intact, light touch intact, MSK Strength 5/5 and symmetric in all extremities, normal gait, 2+ patellar tendon  Musculoskeletal: Full range of motion of all extremities, no clubbing/swelling/edema  Skin: No lesions, No erythema, no jaundice, Cap Refill < 2s  Psychiatric: Appropriate mood and affect  Heme/Lymph/Immune: No cervical LAD    Well woman examination per OB/GYN    DATA REVIEWED   Labs:  Recent Results (from the past 8760 hours)   CBC, Platelet; No Differential    Collection Time: 07/26/24  9:32 AM   Result Value Ref Range    WBC 9.6 4.0 - 11.0 x10(3) uL    RBC 3.81 3.80 - 5.30 x10(6)uL    HGB 11.7 (L) 12.0 - 16.0 g/dL    HCT 34.6 (L) 35.0 - 48.0 %    MCV 90.8 80.0 - 100.0 fL    MCH 30.7 26.0 - 34.0 pg    MCHC 33.8 31.0 - 37.0 g/dL    RDW 12.8 11.0 - 15.0 %    RDW-SD 42.5 35.1 - 46.3 fL    .0 150.0 - 450.0 10(3)uL     *Note: Due to a large number of results and/or encounters for the requested time period, some results have not been displayed. A complete set of results can be found in Results Review.       Recent Results (from the past 8760 hours)   Comp Metabolic Panel (14)    Collection Time: 07/10/24 10:06 AM   Result Value Ref Range    Glucose 106 (H) 70 - 99 mg/dL    Sodium 143 136 - 145 mmol/L    Potassium 3.7 3.5 - 5.1 mmol/L    Chloride 115 (H) 98 - 112 mmol/L    CO2 23.0 21.0 - 32.0 mmol/L    Anion Gap 5 0 - 18 mmol/L    BUN 22 9 - 23 mg/dL    Creatinine 0.81 0.55 - 1.02 mg/dL    BUN/CREA Ratio 27.2 (H) 10.0 - 20.0    Calcium, Total 9.6 8.7 - 10.4 mg/dL    Calculated Osmolality 300 (H) 275 - 295 mOsm/kg    eGFR-Cr 80 >=60 mL/min/1.73m2     ALT 17 10 - 49 U/L    AST 15 <=34 U/L    Alkaline Phosphatase 61 55 - 142 U/L    Bilirubin, Total 0.5 0.2 - 1.1 mg/dL    Total Protein 6.9 5.7 - 8.2 g/dL    Albumin 4.6 3.2 - 4.8 g/dL    Globulin  2.3 2.0 - 3.5 g/dL    A/G Ratio 2.0 1.0 - 2.0    Patient Fasting for CMP? No      *Note: Due to a large number of results and/or encounters for the requested time period, some results have not been displayed. A complete set of results can be found in Results Review.       Cholesterol, Total (mg/dL)   Date Value   12/22/2023 199     HDL Cholesterol (mg/dL)   Date Value   12/22/2023 69 (H)     LDL Cholesterol (mg/dL)   Date Value   12/22/2023 122 (H)     Triglycerides (mg/dL)   Date Value   12/22/2023 44       Last A1c value was 5.5% done 7/10/2024.        Imaging:  CT chest 12/30/2022  FINDINGS:  LINES AND TUBES: None.     MEDIASTINUM/VASCULATURE: There are multiple mildly prominent mediastinal lymph nodes which are nonspecific and measure less than 1 cm in short axis. There is atherosclerotic calcification of the aortic arch and thoracic aorta. The thoracic aorta is  otherwise unremarkable and not dilated. Mediastinal fat planes are preserved. Cardiac chambers are unremarkable. Pericardium is normal. There are coronary artery calcifications. The main pulmonary artery has a normal diameter and is otherwise  unremarkable.     LUNGS AND PLEURA: There is biapical scarring and pleural thickening. Mild upper lobe predominant centrilobular and paraseptal emphysematous changes.  There is bibasilar and lingular atelectasis and scarring. No pneumothorax.  No pleural effusion. The  trachea and central airways are unremarkable.  Since the prior exam, there has been development of a 2 x 1.4 cm consolidative airspace opacity with air bronchograms within the lingula.  There is mucous plugging seen within the bilateral lower lobe and  lingular bronchi. Nodularity seen within the trachea and within the right mainstem bronchus suggestive  of retained secretions.       CHEST WALL/BONES: There is degenerative disease of the thoracic spine. The chest wall and osseous structures are otherwise unremarkable.     LIMITED ABDOMEN: 0.8 cm low-attenuation lesion within the right hepatic lobe is unchanged since the prior exams. exams.               Impression   CONCLUSION:     2 cm consolidative airspace opacity within the lingula with air bronchograms is new since the prior exam and is suggestive of scar or atelectasis. Followup chest CT recommended in 6 months to demonstrate resolution.       Mucous plugging within the bilateral lower lobes and lingula.     Small amounts of retained secretions within the trachea and right mainstem bronchus.     Mild paraseptal and centrilobular emphysema.     Multiple other incidental findings as described in the body of the report which are unchanged.         CT chest 6/24/2023  Impression   CONCLUSION:  1. Previously described lingular consolidation has resolved with residual linear scar.  2. Moderate emphysema.  3. Stable benign noncalcified 4 mm right lower lobe pulmonary nodule.  4. Stable coronary artery calcification.  5. Prior granulomatous disease in the chest.            Stress echo 7/15/2023  INTERPRETATION: This patient exercised for 9 minutes on a Scott protocol, stopping due to dyspnea and leg fatigue.  Patient achieved a peak heart rate of 137 beats per minute (89% of their APMHR) and a peak blood pressure of 146/68 mmHg.  There were 8.2 METs achieved, which is average for their age.  The EKG was negative for ischemia. No ST depression or angina occurred.  Patient denied cardiac symptoms. Rare isolated PVC's noted.     Resting echocardiographic images revealed normal chamber sizes and valvular structure with uniformly normal left ventricular contractility.     Immediate post exercise imaging revealed a decrease in left ventricular size and increased contractility of all wall segments consistent with a normal  stress echocardiogram response.     IMPRESSION:  Normal stress echocardiogram.     Cervical x-ray 7/26/2024        Impression   CONCLUSION:  1. Anterior cervical fusion from C5 through C7 as described above.  Minimal anterolisthesis of C3 in relation to C4.           Mammogram 1/24/2025  IMPRESSION AND RECOMMENDATION:     No mammographic evidence of malignancy in either breast. Recommend annual screening mammography in   one year.     ASSESSMENT/PLAN:     Abdominal bloating  Associated dysphagia, bloating, right flank pain  - Broad differential, ongoing over the last 6 months.  No systemic symptoms of fevers, chills  - Examination overall benign for GI, and negative CVA tenderness  - Will try to find food triggers.  Recall she has tried the FODMAP diet in the past without much improvement  - She will hold on any antiacid medications as we will try an H. pylori breath test to evaluate  - Obtain fasting blood work as below  - Okay to use Gas-X/simethicone in the meantime  - Will also proceed with urinalysis and CT to evaluate for kidney stones and structural abnormalities.  - If unrevealing, may trial on antiacid medication.  May need GI evaluation for endoscopy     L cervical radiculopathy  -IDA 2020-Dr Timmons --L C7-T1 interlaminar YUNIOR for L C7 radiculopathy.    MRI cervical spine 10/27/20 per Dr Timmons--DDD DJD.   -Refractory to conservative management  - Following with Dr. Walter status post C5-C7 anterior cervical discectomy and fusion  - Tolerated surgery well.  Ongoing follow-up with orthopedic spine    Anxiety and depression  Increase wellbutrin  mg daily. Referral to [facility]illo navigator for counselor.   -This is improved since last visit.  Not requiring medication       Hypertension-(hx renal artery stents)   -on toprol XL 12.5 daily and per Dr Nuñez lisinopril 5 mg daily.  - She stopped her medications as her home readings have been at goal per patient  - Some elevation in repeat today.  Possibly due  to abdominal discomfort, and also getting to her [place] from [place]  - We will check blood pressures at home, she will notify us if increasing.  May need to resume medications     Hypercholesterolemia  -crestor 5 mg daily, coenzyme Q. Check lipids. atrvastn caused leg pain 2018.  - Lipid panel borderline elevated      Aortic regurgitation  -Saw Dr Nuñez 8/22/22--he did echo 8/8/22--EF 60% mild to mod AR. He cont. lisinopril 5 mg daily. To see him at 1 yr.   -Preference is to stop blood pressure medication.  We will monitor as above     Smoker  - Wellbutrin ER--incr to 300 mg daily. Pt trying to cut back and quit.  - Down to three quarters of a pack from 1 pack/day.     Abnormal CT of the chest  CT chest--12/30/22--2 cm consolidative airspace opacity in lingula suggestive of scar or atelectasis new from 2018 CT, emphysema, coronary artery calcifications.   Radiologist recom repeat CT in 6 mo. (copy to Dr Norman).  - CT CHEST most recently showed stable 4 mm nodule.  There was clearance of the lingular consolidation, moderate emphysema noted  - Consider repeat CT scan for later this year     COPD   - on past CT--hx of using flovent inhaler 44 mcg 2 p bid.  Alb inh prn.        Hx Numbness L hand L foot   -resolved- Oct 2019--Unclear etiology   MRI brain-11/4/19-3 x 2 mm pituitary nodule c/w incidental microadenoma. No CVA or tumor.  Very small size of pituitary microadenoma likely to be nonfunctioning.    11/22/19-CMP, lipid, prolactin-results ok. Was to see Dr. Pena.      Renal artery stenosis--s/p bilat renal artery stents 12/29/17  -Doing well.   Renal art doppler 12/6/19-patent right and left bilateral renal arterial stents.     Cardiomyopathy-resolved.  - cardiac cath 8/10/17-Coronaries nl. Mild global LV hypokinesis  -Cardiac work-up as above     Irritable bowel syndrome  -for yrs. Bentyl not helpful. Avoid lactose, try probiotic.    11/10/17--tissue TG IgA Ab--nl. Colonoscopy Dr Card  6/16/18-int hemorrhoids.      Hx atypical ductal hyperplasia R breast bx   11/2012 Dr. Bahena--she Rx evista 60 mg daily-Per last visit with Dr. Zhang, recom hold the Evista in view of hx radial artery thrombosis.        Vitamin D deficiency  -vitamin D 2000 units daily. Vit D level 43.2, at goal     Healthcare maintenance:  Vaccines- tdap 7/25/17. Flu shot utd . Pneumovax 12/26/18 (emphysema on CT 9/2018). shingrix avail at pharmacy. Covid vaccine--declined..   Gyn Dr Gabe Du retired in 2021--now sees Dr Herrera..   Mammo-per gyn- 2/22/22 at [facility]--now is ord by Dr eHrrera.   DEXA-12/7/22--osteopenia.  Colonoscopy Dr Card 6/16/18--int hemorrhoids.           Orders This Visit:  No orders of the defined types were placed in this encounter.      Meds This Visit:  Requested Prescriptions     Pending Prescriptions Disp Refills    albuterol (PROAIR HFA) 108 (90 Base) MCG/ACT Inhalation Aero Soln 3 each 3     Sig: Inhale 2 puffs into the lungs every 6 (six) hours as needed for Wheezing.    fluticasone-umeclidin-vilant (TRELEGY ELLIPTA) 100-62.5-25 MCG/ACT Inhalation Aerosol Powder, Breath Activated 28 each 5     Sig: Inhale 1 puff into the lungs daily.       Imaging & Referrals:  None       Health Maintenance  HTN Screen: At goal  DM Screen: As above  HLD Screen: As above  Osteoporosis Screen (>65 or < 65 with FRAX > 9.3%): Up-to-date  HCV Screen: Considered low risk  HIV Screen: considered low risk  G/C/Syphilis: Considered low risk    Colon Cancer Screening (45-70): Last colonoscopy 2018,   Breast Cancer Screening (40-70): Up-to-date  Cervical Cancer Screening (21-64): Per OB/GYN  Lung Cancer Screening (55-79 with 30 p/year and active < 15 years): Not indicated    Influenza: Annually  Td/Tdap: Last Tdap 2022, due 2032  Zoster (50+): Recommended that patient check with insurance company for coverage, can obtain 2 doses at the pharmacy  HPV (19-26): Not indicated  Pneumococcal: Due for Prevnar  20    Immunization History   Administered Date(s) Administered    Duoneb Unit Dose, Nebu  04/02/2016    FLULAVAL 6 months & older 0.5 ml Prefilled syringe (62785) 11/10/2017, 12/26/2018, 10/25/2019, 10/06/2020, 11/09/2022    Influenza 12/18/2012    Pneumovax 23 12/26/2018    Prednisone  04/02/2016    TDAP 07/25/2017, 10/31/2022         Return to clinic in 6 months for annual physical examination    Spent 30 minutes obtaining history, evaluating patient, discussing treatment options, diet, exercise, review of available labs and radiology reports, and completing documentation.         Armando Norman MD, 02/04/25, 6:49 PM

## 2025-02-03 NOTE — PATIENT INSTRUCTIONS
You are seen clinic today for follow-up.  We happy to hear that you have recovered from your neck surgery from last year.  - Continue following with Dr. Walter of orthopedic spine as needed  - Continue with home stretches and exercises, monitor for any recurrence of symptoms    Today, we did focus on symptoms of difficulty with swallowing, abdominal bloating, flank pain  - We did discuss the various organ systems that can be involved    For gastrointestinal, we should consider inflammation, possible infection from a specific bacteria called H. pylori  - Hold off on any antiacid medications.  You may continue using simethicone/Gas-X as needed  - Try to find any other food triggers in the meantime.  We did provide you with some other triggering foods that can be culprit triggers  - We are checking fasting blood test that can be done at any time.  Specifically looking for H. pylori infection, inflammation, and checking up on the kidney/liver    For the genitourinary tract, nonobstructing kidney stones can present as flank pain.  - Will check urine studies  - We will also check a CT scan of the abdomen and pelvis to evaluate for any structural causes.  - It is good that we are up-to-date on the well woman examination    In some cases, acid reflux can cause difficulty with swallowing.  Lets complete the test first prior to trying antiacid medication  - In some cases, gastroenterology evaluation with upper GI endoscopy may be needed if there is difficulty with swallowing.    We did place repeat fasting blood work to be completed anytime    Return to clinic in 6 months for annual physical examination or sooner based off the initial evaluation

## 2025-02-04 ENCOUNTER — OFFICE VISIT (OUTPATIENT)
Dept: INTERNAL MEDICINE CLINIC | Facility: CLINIC | Age: 65
End: 2025-02-04
Payer: COMMERCIAL

## 2025-02-04 VITALS
HEART RATE: 92 BPM | HEIGHT: 65 IN | SYSTOLIC BLOOD PRESSURE: 148 MMHG | TEMPERATURE: 98 F | OXYGEN SATURATION: 96 % | WEIGHT: 138.38 LBS | BODY MASS INDEX: 23.06 KG/M2 | DIASTOLIC BLOOD PRESSURE: 60 MMHG

## 2025-02-04 DIAGNOSIS — F32.A ANXIETY AND DEPRESSION: ICD-10-CM

## 2025-02-04 DIAGNOSIS — I10 HYPERTENSION, ESSENTIAL: ICD-10-CM

## 2025-02-04 DIAGNOSIS — R13.10 DYSPHAGIA, UNSPECIFIED TYPE: ICD-10-CM

## 2025-02-04 DIAGNOSIS — Z98.890 HISTORY OF STENT INSERTION OF RENAL ARTERY: ICD-10-CM

## 2025-02-04 DIAGNOSIS — J44.9 CHRONIC OBSTRUCTIVE PULMONARY DISEASE, UNSPECIFIED COPD TYPE (HCC): ICD-10-CM

## 2025-02-04 DIAGNOSIS — R10.9 FLANK PAIN: ICD-10-CM

## 2025-02-04 DIAGNOSIS — F41.9 ANXIETY AND DEPRESSION: ICD-10-CM

## 2025-02-04 DIAGNOSIS — R14.0 ABDOMINAL BLOATING: Primary | ICD-10-CM

## 2025-02-04 DIAGNOSIS — E78.00 HYPERCHOLESTEROLEMIA: ICD-10-CM

## 2025-02-04 DIAGNOSIS — E55.9 VITAMIN D DEFICIENCY: ICD-10-CM

## 2025-02-04 PROCEDURE — 3078F DIAST BP <80 MM HG: CPT | Performed by: INTERNAL MEDICINE

## 2025-02-04 PROCEDURE — 3008F BODY MASS INDEX DOCD: CPT | Performed by: INTERNAL MEDICINE

## 2025-02-04 PROCEDURE — 3077F SYST BP >= 140 MM HG: CPT | Performed by: INTERNAL MEDICINE

## 2025-02-04 PROCEDURE — 99214 OFFICE O/P EST MOD 30 MIN: CPT | Performed by: INTERNAL MEDICINE

## 2025-02-04 RX ORDER — FLUTICASONE FUROATE, UMECLIDINIUM BROMIDE AND VILANTEROL TRIFENATATE 100; 62.5; 25 UG/1; UG/1; UG/1
1 POWDER RESPIRATORY (INHALATION) DAILY
Qty: 28 EACH | Refills: 5 | Status: SHIPPED | OUTPATIENT
Start: 2025-02-04

## 2025-02-04 RX ORDER — ALBUTEROL SULFATE 90 UG/1
2 INHALANT RESPIRATORY (INHALATION) EVERY 6 HOURS PRN
Qty: 3 EACH | Refills: 3 | Status: SHIPPED | OUTPATIENT
Start: 2025-02-04

## 2025-02-19 ENCOUNTER — HOSPITAL ENCOUNTER (OUTPATIENT)
Dept: CT IMAGING | Facility: HOSPITAL | Age: 65
Discharge: HOME | End: 2025-02-19
Attending: INTERNAL MEDICINE
Payer: COMMERCIAL

## 2025-02-19 DIAGNOSIS — R14.0 ABDOMINAL BLOATING: ICD-10-CM

## 2025-02-19 DIAGNOSIS — R10.9 FLANK PAIN: ICD-10-CM

## 2025-02-19 PROCEDURE — 74176 CT ABD & PELVIS W/O CONTRAST: CPT | Performed by: INTERNAL MEDICINE

## 2025-03-03 ENCOUNTER — TELEPHONE (OUTPATIENT)
Dept: INTERNAL MEDICINE CLINIC | Facility: CLINIC | Age: 65
End: 2025-03-03

## 2025-03-03 DIAGNOSIS — R14.0 ABDOMINAL BLOATING: Primary | ICD-10-CM

## 2025-03-04 NOTE — TELEPHONE ENCOUNTER
CT abdomen and pelvis  - Circumferential wall thickening, bilateral renal artery stents, small hepatic cyst.  - Small calcified degenerated uterine fibroid  - 2 welcome circumscribed low-density liver lesions  - No defined mass or abnormal enlargement.  - Mild to moderate gastric distention, mild to moderate colonic fecal burden.  But no suspicious inflammation, no lymphadenopathy.    Left voicemail and sent a MyChart message regarding nextsteps.

## 2025-03-12 ENCOUNTER — LAB ENCOUNTER (OUTPATIENT)
Dept: LAB | Facility: HOSPITAL | Age: 65
End: 2025-03-12
Attending: INTERNAL MEDICINE
Payer: COMMERCIAL

## 2025-03-12 DIAGNOSIS — R10.9 FLANK PAIN: ICD-10-CM

## 2025-03-12 DIAGNOSIS — E55.9 VITAMIN D DEFICIENCY: ICD-10-CM

## 2025-03-12 DIAGNOSIS — I10 HYPERTENSION, ESSENTIAL: ICD-10-CM

## 2025-03-12 DIAGNOSIS — R14.0 ABDOMINAL BLOATING: ICD-10-CM

## 2025-03-12 DIAGNOSIS — E78.00 HYPERCHOLESTEROLEMIA: ICD-10-CM

## 2025-03-12 LAB
ALBUMIN SERPL-MCNC: 4.6 G/DL (ref 3.2–4.8)
ALBUMIN/GLOB SERPL: 1.9 {RATIO} (ref 1–2)
ALP LIVER SERPL-CCNC: 66 U/L
ALT SERPL-CCNC: 18 U/L
ANION GAP SERPL CALC-SCNC: 7 MMOL/L (ref 0–18)
AST SERPL-CCNC: 18 U/L (ref ?–34)
BASOPHILS # BLD AUTO: 0.04 X10(3) UL (ref 0–0.2)
BASOPHILS NFR BLD AUTO: 0.4 %
BILIRUB SERPL-MCNC: 0.4 MG/DL (ref 0.2–1.1)
BILIRUB UR QL: NEGATIVE
BUN BLD-MCNC: 13 MG/DL (ref 9–23)
BUN/CREAT SERPL: 10.9 (ref 10–20)
CALCIUM BLD-MCNC: 9.7 MG/DL (ref 8.7–10.4)
CHLORIDE SERPL-SCNC: 108 MMOL/L (ref 98–112)
CHOLEST SERPL-MCNC: 196 MG/DL (ref ?–200)
CLARITY UR: CLEAR
CO2 SERPL-SCNC: 25 MMOL/L (ref 21–32)
CREAT BLD-MCNC: 1.19 MG/DL
CRP SERPL-MCNC: <0.4 MG/DL (ref ?–1)
DEPRECATED RDW RBC AUTO: 42.7 FL (ref 35.1–46.3)
EGFRCR SERPLBLD CKD-EPI 2021: 50 ML/MIN/1.73M2 (ref 60–?)
EOSINOPHIL # BLD AUTO: 0.29 X10(3) UL (ref 0–0.7)
EOSINOPHIL NFR BLD AUTO: 3 %
ERYTHROCYTE [DISTWIDTH] IN BLOOD BY AUTOMATED COUNT: 13.2 % (ref 11–15)
FASTING PATIENT LIPID ANSWER: NO
FASTING STATUS PATIENT QL REPORTED: NO
GLOBULIN PLAS-MCNC: 2.4 G/DL (ref 2–3.5)
GLUCOSE BLD-MCNC: 89 MG/DL (ref 70–99)
GLUCOSE UR-MCNC: NORMAL MG/DL
HCT VFR BLD AUTO: 41.1 %
HDLC SERPL-MCNC: 59 MG/DL (ref 40–59)
HGB BLD-MCNC: 14.1 G/DL
HGB UR QL STRIP.AUTO: NEGATIVE
IMM GRANULOCYTES # BLD AUTO: 0.07 X10(3) UL (ref 0–1)
IMM GRANULOCYTES NFR BLD: 0.7 %
KETONES UR-MCNC: NEGATIVE MG/DL
LDLC SERPL CALC-MCNC: 116 MG/DL (ref ?–100)
LEUKOCYTE ESTERASE UR QL STRIP.AUTO: NEGATIVE
LYMPHOCYTES # BLD AUTO: 2.69 X10(3) UL (ref 1–4)
LYMPHOCYTES NFR BLD AUTO: 27.4 %
MCH RBC QN AUTO: 30.2 PG (ref 26–34)
MCHC RBC AUTO-ENTMCNC: 34.3 G/DL (ref 31–37)
MCV RBC AUTO: 88 FL
MONOCYTES # BLD AUTO: 0.72 X10(3) UL (ref 0.1–1)
MONOCYTES NFR BLD AUTO: 7.3 %
NEUTROPHILS # BLD AUTO: 5.99 X10 (3) UL (ref 1.5–7.7)
NEUTROPHILS # BLD AUTO: 5.99 X10(3) UL (ref 1.5–7.7)
NEUTROPHILS NFR BLD AUTO: 61.2 %
NITRITE UR QL STRIP.AUTO: NEGATIVE
NONHDLC SERPL-MCNC: 137 MG/DL (ref ?–130)
OSMOLALITY SERPL CALC.SUM OF ELEC: 290 MOSM/KG (ref 275–295)
PH UR: 5 [PH] (ref 5–8)
PLATELET # BLD AUTO: 209 10(3)UL (ref 150–450)
POTASSIUM SERPL-SCNC: 4.1 MMOL/L (ref 3.5–5.1)
PROT SERPL-MCNC: 7 G/DL (ref 5.7–8.2)
PROT UR-MCNC: NEGATIVE MG/DL
RBC # BLD AUTO: 4.67 X10(6)UL
SODIUM SERPL-SCNC: 140 MMOL/L (ref 136–145)
SP GR UR STRIP: 1.01 (ref 1–1.03)
TRIGL SERPL-MCNC: 121 MG/DL (ref 30–149)
UROBILINOGEN UR STRIP-ACNC: NORMAL
VIT D+METAB SERPL-MCNC: 69.5 NG/ML (ref 30–100)
VLDLC SERPL CALC-MCNC: 21 MG/DL (ref 0–30)
WBC # BLD AUTO: 9.8 X10(3) UL (ref 4–11)

## 2025-03-12 PROCEDURE — 80061 LIPID PANEL: CPT

## 2025-03-12 PROCEDURE — 86140 C-REACTIVE PROTEIN: CPT

## 2025-03-12 PROCEDURE — 85025 COMPLETE CBC W/AUTO DIFF WBC: CPT

## 2025-03-12 PROCEDURE — 82306 VITAMIN D 25 HYDROXY: CPT

## 2025-03-12 PROCEDURE — 81003 URINALYSIS AUTO W/O SCOPE: CPT

## 2025-03-12 PROCEDURE — 36415 COLL VENOUS BLD VENIPUNCTURE: CPT

## 2025-03-12 PROCEDURE — 80053 COMPREHEN METABOLIC PANEL: CPT

## 2025-03-18 ENCOUNTER — TELEPHONE (OUTPATIENT)
Dept: INTERNAL MEDICINE CLINIC | Facility: CLINIC | Age: 65
End: 2025-03-18

## 2025-03-18 NOTE — TELEPHONE ENCOUNTER
Please notify patient that the blood work from 3/12 overall looks stable from last year.  Cholesterol is was borderline high 116, goal less than 100.  All other blood work and urine test at goal.  Thankfully the workup for the abdominal symptoms came back reassuring.  Lets continue with avoiding food triggers, trying to find any specific patterns.  If still no better, may need to see gastroenterology

## 2025-06-11 DIAGNOSIS — J44.9 CHRONIC OBSTRUCTIVE PULMONARY DISEASE, UNSPECIFIED COPD TYPE (HCC): ICD-10-CM

## 2025-06-11 RX ORDER — ALBUTEROL SULFATE 90 UG/1
2 INHALANT RESPIRATORY (INHALATION) EVERY 6 HOURS PRN
Qty: 3 EACH | Refills: 3 | Status: SHIPPED | OUTPATIENT
Start: 2025-06-11

## 2025-06-11 NOTE — TELEPHONE ENCOUNTER
Refill request is for a maintenance medication and has met the criteria specified in the Ambulatory Medication Refill Standing Order for eligibility, visits, laboratory, alerts and was sent to the requested pharmacy.    Requested Prescriptions     Signed Prescriptions Disp Refills    albuterol 108 (90 Base) MCG/ACT Inhalation Aero Soln 3 each 3     Sig: INHALE 2 PUFFS INTO THE LUNGS EVERY 6 HOURS AS NEEDED FOR WHEEZING     Authorizing Provider: DU TY     Ordering User: LEONEL ALLEN RX from 2/4/2025 was not released to pharmacy, RX resent

## (undated) DEVICE — GOWN,SIRUS,FABRIC-REINFORCED,X-LARGE: Brand: MEDLINE

## (undated) DEVICE — Device

## (undated) DEVICE — FORCEP CUSH BAY BP DISP 7.5IN

## (undated) DEVICE — BIT DRL 11MM TI ALLY FOR ACP SPNL PLT SYS

## (undated) DEVICE — 3.0MM PRECISION NEURO (MATCH HEAD)

## (undated) DEVICE — C-ARMOR C-ARM EQUIPMENT COVERS CLEAR STERILE UNIVERSAL FIT 12 PER CASE: Brand: C-ARMOR

## (undated) DEVICE — GLOVE SUR 8.5 SENSICARE PI MIC PIP CRM PWD F

## (undated) DEVICE — CERVICAL CDS: Brand: MEDLINE INDUSTRIES, INC.

## (undated) DEVICE — SOLUTION IRRIG 1000ML 0.9% NACL USP BTL

## (undated) DEVICE — ADHESIVE SKIN TOP FOR WND CLSR DERMBND ADV

## (undated) DEVICE — GLOVE SUR 7 SENSICARE PI MIC PIP CRM PWD F

## (undated) DEVICE — COLLAR CERV SFT 4X22IN UNIV

## (undated) DEVICE — NON-ADHERENT DRESSING: Brand: TELFA

## (undated) DEVICE — ANTIBACTERIAL UNDYED BRAIDED (POLYGLACTIN 910), SYNTHETIC ABSORBABLE SUTURE: Brand: COATED VICRYL

## (undated) DEVICE — GOWN,SIRUS,FABRIC-REINFORCED,LARGE: Brand: MEDLINE

## (undated) DEVICE — SPONGE SURG 0.25X9/16IN WHT COT KTNR DISECT

## (undated) DEVICE — DRAPE THYRD 76IN X 124IN SMS DMND FEN CTRL +

## (undated) DEVICE — GLOVE SUR 6.5 SENSICARE PI MIC PIP CRM PWD F

## (undated) DEVICE — GLOVE SUR 6.5 SENSICARE PI PIP GRN PWD F

## (undated) DEVICE — KIT HEMSTAT MTRX 8ML PORCINE GEL HUM THROM

## (undated) DEVICE — SPONGE GZ 4X4IN COT 12 PLY TYP VII WVN C

## (undated) DEVICE — E-Z CLEAN, NON-STICK, PTFE COATED, ELECTROSURGICAL BLADE ELECTRODE, MODIFIED EXTENDED INSULATION, 4 INCH (10.2 CM): Brand: MEGADYNE

## (undated) DEVICE — INTENDED USE FOR SURGICAL MARKING ON INTACT SKIN, ALSO PROVIDES A PERMANENT METHOD OF IDENTIFYING OBJECTS IN THE OPERATING ROOM: Brand: WRITESITE® PLUS MINI PREP RESISTANT MARKER

## (undated) DEVICE — SHEET,DRAPE,53X77,STERILE: Brand: MEDLINE

## (undated) DEVICE — 3M™ TEGADERM™ HP TRANSPARENT FILM DRESSING FRAME STYLE, 9534HP, 2-3/8 X 2-3/4 IN (6 CM X 7 CM), 100/CT 4CT/CASE: Brand: 3M™ TEGADERM™

## (undated) DEVICE — KIT EVAC 400CC DIA1/8IN H PAT 12.5IN 3 SPR

## (undated) DEVICE — MAXCESS C MODULE

## (undated) DEVICE — MONITORING NEUROPHYSIOLOGICAL

## (undated) DEVICE — PIN DISTR L12MM SPNL MAXCESS-C

## (undated) DEVICE — SUT MCRYL 3-0 18IN PS-2 ABSRB UD 19MM 3/8 CIR

## (undated) NOTE — LETTER
Hospital Discharge Documentation  Please phone to schedule a hospital follow up appointment. Discharge Instructions  FOLLOWUP :  With Dr. Marianna Beauchamp in 3 days, Dr. Fernando Flores in 2 weeks.     From: [id number] Hospitalist's Office  Phone: [phone]    XFDPN VITAL SIGNS:  On discharge, temperature is 97.7, pulse 58, respiratory rate 16, blood pressure 106/51, pulse oximetry 96%. LUNGS:  Clear. HEART:  Normal S1, S2. No S3.  ABDOMEN:  Soft. EXTREMITIES:  Without calf tenderness.   NEUROLOGICAL:  Alert, [place]

## (undated) NOTE — LETTER
[facility], [place]  Authorization for Invasive Procedures  1.  I hereby authorize  Dr Katrin Rubin , my physician and whomever may be designated as the doctor's assistant, to perform the following operation and/or procedure: Renal A performed for the purposes of advancing medicine, science, and/or education, provided my identity is not revealed. If the procedure has been videotaped, the physician/surgeon will obtain the original videotape.  The hospital will not be responsible for stor My signature below affirms that prior to the time of the procedure, I have explained to the patient and/or her legal representative, the risks and benefits involved in the proposed treatment and any reasonable alternative to the proposed treatment.  I have

## (undated) NOTE — LETTER
[address], [place]  Authorization for Invasive Procedures  1.  I hereby authorize Dr. Arin Rodarte , my physician and whomever may be designated as the doctor's assistant, to perform the following operation and/or procedure:  Renal Arter performed for the purposes of advancing medicine, science, and/or education, provided my identity is not revealed. If the procedure has been videotaped, the physician/surgeon will obtain the original videotape.  The hospital will not be responsible for stor My signature below affirms that prior to the time of the procedure, I have explained to the patient and/or her legal representative, the risks and benefits involved in the proposed treatment and any reasonable alternative to the proposed treatment.  I have

## (undated) NOTE — LETTER
[address], [place]  Authorization for Invasive Procedures  1.  I hereby authorize Dr. Rom Canales , my physician and whomever may be designated as the doctor's assistant, to perform the following operation and/or procedure:  CARDIAC C 5. I consent to the photographing of the operations or procedures to be performed for the purposes of advancing medicine, science, and/or education, provided my identity is not revealed.  If the procedure has been videotaped, the physician/surgeon will obta __________ Time: ___________    Statement of Physician  My signature below affirms that prior to the time of the procedure, I have explained to the patient and/or her legal representative, the risks and benefits involved in the proposed treatment and any r

## (undated) NOTE — LETTER
Hospital Discharge Documentation  Please phone to schedule a hospital follow up appointment. Pt is new on [facility]. She was instructed to follow up with you in 3 days and f/u with Dr Lazaro Jimenez (cardiology)  in 2 weeks .      From: [facility] PHYSICAL EXAMINATION:    VITAL SIGNS:  On discharge, temperature is 97.7, pulse 58, respiratory rate 16, blood pressure 106/51, pulse oximetry 96%. LUNGS:  Clear. HEART:  Normal S1, S2. No S3.  ABDOMEN:  Soft. EXTREMITIES:  Without calf tenderness.   Rachel Sharp

## (undated) NOTE — LETTER
[facility] SURGERY SCHEDULING FORM   [address] [facility]   [phone] (scheduling phone) [phone] (scheduling fax)     PATIENT INFORMATION   Last Name:      Kirk Hernandez      First Name:    Sandoval Farr Completed by:    Saira Headings      Date:    11/2/2020

## (undated) NOTE — Clinical Note
Patient contacted for TCM encounter/hospital follow-up, states she saw Max Buerger yesterday and also spoke to Sriram More who said it was ok for her to keep the 9/8/17 appointment, no need to be seen sooner unless anything changes.

## (undated) NOTE — LETTER
11/02/20        To Whom It May Concern:    Dr. Meeta Beal is recommending cervical epidural steroid injection on 11/10/20. Mrs. Mary Ramachandran will need authorization from Dr. Josefina Be to hold Aspirin 81mg 7 days prior to procedure.      Please call/fax determination